# Patient Record
Sex: FEMALE | Race: WHITE | Employment: UNEMPLOYED | URBAN - METROPOLITAN AREA
[De-identification: names, ages, dates, MRNs, and addresses within clinical notes are randomized per-mention and may not be internally consistent; named-entity substitution may affect disease eponyms.]

---

## 2017-01-06 DIAGNOSIS — G89.4 CHRONIC PAIN SYNDROME: ICD-10-CM

## 2017-01-06 DIAGNOSIS — M17.0 PRIMARY OSTEOARTHRITIS OF BOTH KNEES: ICD-10-CM

## 2017-01-06 DIAGNOSIS — M79.7 FIBROMYALGIA: ICD-10-CM

## 2017-01-06 DIAGNOSIS — M51.37 DEGENERATION OF LUMBAR OR LUMBOSACRAL INTERVERTEBRAL DISC: ICD-10-CM

## 2017-01-06 RX ORDER — DICLOFENAC SODIUM 75 MG/1
TABLET, DELAYED RELEASE ORAL
Qty: 180 TABLET | Refills: 0 | OUTPATIENT
Start: 2017-01-06

## 2017-01-24 ENCOUNTER — OFFICE VISIT (OUTPATIENT)
Dept: PAIN MANAGEMENT | Age: 62
End: 2017-01-24

## 2017-01-24 VITALS
BODY MASS INDEX: 41.88 KG/M2 | HEART RATE: 67 BPM | SYSTOLIC BLOOD PRESSURE: 122 MMHG | DIASTOLIC BLOOD PRESSURE: 73 MMHG | WEIGHT: 229 LBS

## 2017-01-24 DIAGNOSIS — G89.29 CHRONIC NONINTRACTABLE HEADACHE, UNSPECIFIED HEADACHE TYPE: ICD-10-CM

## 2017-01-24 DIAGNOSIS — R51.9 CHRONIC NONINTRACTABLE HEADACHE, UNSPECIFIED HEADACHE TYPE: ICD-10-CM

## 2017-01-24 DIAGNOSIS — M51.37 DEGENERATION OF LUMBAR OR LUMBOSACRAL INTERVERTEBRAL DISC: ICD-10-CM

## 2017-01-24 DIAGNOSIS — M79.7 FIBROMYALGIA: ICD-10-CM

## 2017-01-24 DIAGNOSIS — G89.4 CHRONIC PAIN SYNDROME: ICD-10-CM

## 2017-01-24 DIAGNOSIS — M17.0 PRIMARY OSTEOARTHRITIS OF BOTH KNEES: ICD-10-CM

## 2017-01-24 PROCEDURE — 99213 OFFICE O/P EST LOW 20 MIN: CPT | Performed by: INTERNAL MEDICINE

## 2017-01-24 RX ORDER — OXYCODONE HYDROCHLORIDE 5 MG/1
5 TABLET ORAL EVERY 6 HOURS PRN
Qty: 105 TABLET | Refills: 0 | Status: SHIPPED | OUTPATIENT
Start: 2017-01-24 | End: 2017-02-28 | Stop reason: SDUPTHER

## 2017-01-24 RX ORDER — GABAPENTIN 600 MG/1
TABLET ORAL
Qty: 360 TABLET | Refills: 0 | Status: SHIPPED | OUTPATIENT
Start: 2017-01-24 | End: 2017-04-04 | Stop reason: SDUPTHER

## 2017-01-24 RX ORDER — MORPHINE SULFATE 30 MG/1
30 TABLET, FILM COATED, EXTENDED RELEASE ORAL DAILY
Qty: 35 TABLET | Refills: 0 | Status: SHIPPED | OUTPATIENT
Start: 2017-01-24 | End: 2017-02-28 | Stop reason: SDUPTHER

## 2017-01-24 RX ORDER — TOPIRAMATE 25 MG/1
TABLET ORAL
Qty: 60 TABLET | Refills: 0 | Status: SHIPPED | OUTPATIENT
Start: 2017-01-24 | End: 2017-02-28 | Stop reason: SDUPTHER

## 2017-01-24 RX ORDER — DICLOFENAC SODIUM 75 MG/1
TABLET, DELAYED RELEASE ORAL
Qty: 180 TABLET | Refills: 0 | Status: SHIPPED | OUTPATIENT
Start: 2017-01-24 | End: 2017-04-04 | Stop reason: SDUPTHER

## 2017-01-24 RX ORDER — AMITRIPTYLINE HYDROCHLORIDE 25 MG/1
TABLET, FILM COATED ORAL
Qty: 180 TABLET | Refills: 0 | Status: SHIPPED | OUTPATIENT
Start: 2017-01-24 | End: 2017-04-04 | Stop reason: SDUPTHER

## 2017-02-28 ENCOUNTER — OFFICE VISIT (OUTPATIENT)
Dept: PAIN MANAGEMENT | Age: 62
End: 2017-02-28

## 2017-02-28 VITALS
SYSTOLIC BLOOD PRESSURE: 134 MMHG | BODY MASS INDEX: 41.15 KG/M2 | WEIGHT: 225 LBS | HEART RATE: 72 BPM | DIASTOLIC BLOOD PRESSURE: 66 MMHG

## 2017-02-28 DIAGNOSIS — M79.7 FIBROMYALGIA: ICD-10-CM

## 2017-02-28 DIAGNOSIS — M17.0 PRIMARY OSTEOARTHRITIS OF BOTH KNEES: ICD-10-CM

## 2017-02-28 DIAGNOSIS — G89.4 CHRONIC PAIN SYNDROME: ICD-10-CM

## 2017-02-28 DIAGNOSIS — M51.37 DEGENERATION OF LUMBAR OR LUMBOSACRAL INTERVERTEBRAL DISC: ICD-10-CM

## 2017-02-28 PROCEDURE — 99213 OFFICE O/P EST LOW 20 MIN: CPT | Performed by: INTERNAL MEDICINE

## 2017-02-28 RX ORDER — TOPIRAMATE 25 MG/1
TABLET ORAL
Qty: 60 TABLET | Refills: 0 | Status: SHIPPED | OUTPATIENT
Start: 2017-02-28 | End: 2017-04-04 | Stop reason: SDUPTHER

## 2017-02-28 RX ORDER — OXYCODONE HYDROCHLORIDE 5 MG/1
5 TABLET ORAL EVERY 6 HOURS PRN
Qty: 105 TABLET | Refills: 0 | Status: SHIPPED | OUTPATIENT
Start: 2017-02-28 | End: 2017-04-04 | Stop reason: SDUPTHER

## 2017-02-28 RX ORDER — MORPHINE SULFATE 30 MG/1
30 TABLET, FILM COATED, EXTENDED RELEASE ORAL DAILY
Qty: 35 TABLET | Refills: 0 | Status: SHIPPED | OUTPATIENT
Start: 2017-02-28 | End: 2017-04-04 | Stop reason: SDUPTHER

## 2017-04-04 ENCOUNTER — OFFICE VISIT (OUTPATIENT)
Dept: ORTHOPEDIC SURGERY | Age: 62
End: 2017-04-04

## 2017-04-04 ENCOUNTER — OFFICE VISIT (OUTPATIENT)
Dept: PAIN MANAGEMENT | Age: 62
End: 2017-04-04

## 2017-04-04 VITALS
HEIGHT: 62 IN | HEART RATE: 74 BPM | SYSTOLIC BLOOD PRESSURE: 136 MMHG | BODY MASS INDEX: 41.42 KG/M2 | WEIGHT: 225.09 LBS | DIASTOLIC BLOOD PRESSURE: 78 MMHG

## 2017-04-04 VITALS
DIASTOLIC BLOOD PRESSURE: 69 MMHG | SYSTOLIC BLOOD PRESSURE: 124 MMHG | BODY MASS INDEX: 41.41 KG/M2 | HEART RATE: 70 BPM | WEIGHT: 225 LBS

## 2017-04-04 DIAGNOSIS — M79.7 FIBROMYALGIA: ICD-10-CM

## 2017-04-04 DIAGNOSIS — L03.032 PARONYCHIA OF GREAT TOE, LEFT: Primary | ICD-10-CM

## 2017-04-04 DIAGNOSIS — R51.9 CHRONIC NONINTRACTABLE HEADACHE, UNSPECIFIED HEADACHE TYPE: ICD-10-CM

## 2017-04-04 DIAGNOSIS — M51.37 DEGENERATION OF LUMBAR OR LUMBOSACRAL INTERVERTEBRAL DISC: ICD-10-CM

## 2017-04-04 DIAGNOSIS — G89.29 CHRONIC NONINTRACTABLE HEADACHE, UNSPECIFIED HEADACHE TYPE: ICD-10-CM

## 2017-04-04 DIAGNOSIS — G89.4 CHRONIC PAIN SYNDROME: ICD-10-CM

## 2017-04-04 DIAGNOSIS — M17.0 PRIMARY OSTEOARTHRITIS OF BOTH KNEES: ICD-10-CM

## 2017-04-04 PROCEDURE — 99213 OFFICE O/P EST LOW 20 MIN: CPT | Performed by: INTERNAL MEDICINE

## 2017-04-04 PROCEDURE — 99202 OFFICE O/P NEW SF 15 MIN: CPT | Performed by: PODIATRIST

## 2017-04-04 RX ORDER — GABAPENTIN 600 MG/1
TABLET ORAL
Qty: 360 TABLET | Refills: 0 | Status: SHIPPED | OUTPATIENT
Start: 2017-04-04 | End: 2017-05-09 | Stop reason: SDUPTHER

## 2017-04-04 RX ORDER — DICLOFENAC SODIUM 75 MG/1
TABLET, DELAYED RELEASE ORAL
Qty: 180 TABLET | Refills: 0 | Status: SHIPPED | OUTPATIENT
Start: 2017-04-04 | End: 2017-05-09 | Stop reason: SDUPTHER

## 2017-04-04 RX ORDER — CEPHALEXIN 500 MG/1
500 CAPSULE ORAL 3 TIMES DAILY
Qty: 30 CAPSULE | Refills: 0 | Status: SHIPPED | OUTPATIENT
Start: 2017-04-04 | End: 2018-01-02

## 2017-04-04 RX ORDER — AMITRIPTYLINE HYDROCHLORIDE 25 MG/1
TABLET, FILM COATED ORAL
Qty: 180 TABLET | Refills: 0 | Status: SHIPPED | OUTPATIENT
Start: 2017-04-04 | End: 2017-05-09 | Stop reason: SDUPTHER

## 2017-04-04 RX ORDER — OXYCODONE HYDROCHLORIDE 5 MG/1
5 TABLET ORAL EVERY 6 HOURS PRN
Qty: 105 TABLET | Refills: 0 | Status: SHIPPED | OUTPATIENT
Start: 2017-04-04 | End: 2017-05-09 | Stop reason: SDUPTHER

## 2017-04-04 RX ORDER — FLUOXETINE HYDROCHLORIDE 40 MG/1
CAPSULE ORAL
COMMUNITY
Start: 2017-02-06

## 2017-04-04 RX ORDER — TOPIRAMATE 25 MG/1
TABLET ORAL
Qty: 60 TABLET | Refills: 0 | Status: SHIPPED | OUTPATIENT
Start: 2017-04-04 | End: 2017-05-09 | Stop reason: SDUPTHER

## 2017-04-04 RX ORDER — MORPHINE SULFATE 30 MG/1
30 TABLET, FILM COATED, EXTENDED RELEASE ORAL DAILY
Qty: 35 TABLET | Refills: 0 | Status: SHIPPED | OUTPATIENT
Start: 2017-04-04 | End: 2017-05-09 | Stop reason: SDUPTHER

## 2017-04-04 RX ORDER — VITAMIN A ACETATE, .BETA.-CAROTENE, ASCORBIC ACID, CHOLECALCIFEROL, .ALPHA.-TOCOPHEROL ACETATE, DL-, THIAMINE MONONITRATE, RIBOFLAVIN, NIACINAMIDE, PYRIDOXINE HYDROCHLORIDE, FOLIC ACID, CYANOCOBALAMIN, CALCIUM CARBONATE, FERROUS FUMARATE, ZINC OXIDE, AND CUPRIC OXIDE 2000; 2000; 120; 400; 22; 1.84; 3; 20; 10; 1; 12; 200; 27; 25; 2 [IU]/1; [IU]/1; MG/1; [IU]/1; MG/1; MG/1; MG/1; MG/1; MG/1; MG/1; UG/1; MG/1; MG/1; MG/1; MG/1
TABLET ORAL
COMMUNITY
Start: 2017-03-13

## 2017-04-04 RX ORDER — RANITIDINE 300 MG/1
TABLET ORAL
COMMUNITY
Start: 2017-04-01 | End: 2020-04-27

## 2017-05-09 ENCOUNTER — OFFICE VISIT (OUTPATIENT)
Dept: PAIN MANAGEMENT | Age: 62
End: 2017-05-09

## 2017-05-09 VITALS
BODY MASS INDEX: 41.59 KG/M2 | HEART RATE: 68 BPM | DIASTOLIC BLOOD PRESSURE: 72 MMHG | WEIGHT: 226 LBS | SYSTOLIC BLOOD PRESSURE: 123 MMHG

## 2017-05-09 DIAGNOSIS — M51.37 DEGENERATION OF LUMBAR OR LUMBOSACRAL INTERVERTEBRAL DISC: ICD-10-CM

## 2017-05-09 DIAGNOSIS — M17.0 PRIMARY OSTEOARTHRITIS OF BOTH KNEES: ICD-10-CM

## 2017-05-09 DIAGNOSIS — G89.4 CHRONIC PAIN SYNDROME: ICD-10-CM

## 2017-05-09 DIAGNOSIS — M79.7 FIBROMYALGIA: ICD-10-CM

## 2017-05-09 PROCEDURE — 99213 OFFICE O/P EST LOW 20 MIN: CPT | Performed by: INTERNAL MEDICINE

## 2017-05-09 RX ORDER — OXYCODONE HYDROCHLORIDE 5 MG/1
5 TABLET ORAL EVERY 6 HOURS PRN
Qty: 105 TABLET | Refills: 0 | Status: SHIPPED | OUTPATIENT
Start: 2017-05-09 | End: 2017-06-13 | Stop reason: SDUPTHER

## 2017-05-09 RX ORDER — AMITRIPTYLINE HYDROCHLORIDE 25 MG/1
TABLET, FILM COATED ORAL
Qty: 180 TABLET | Refills: 0
Start: 2017-05-09 | End: 2017-07-18 | Stop reason: SDUPTHER

## 2017-05-09 RX ORDER — MORPHINE SULFATE 30 MG/1
30 TABLET, FILM COATED, EXTENDED RELEASE ORAL DAILY
Qty: 35 TABLET | Refills: 0 | Status: SHIPPED | OUTPATIENT
Start: 2017-05-09 | End: 2017-06-13 | Stop reason: SDUPTHER

## 2017-05-09 RX ORDER — GABAPENTIN 600 MG/1
TABLET ORAL
Qty: 360 TABLET | Refills: 0
Start: 2017-05-09 | End: 2017-07-18 | Stop reason: SDUPTHER

## 2017-05-09 RX ORDER — TOPIRAMATE 25 MG/1
TABLET ORAL
Qty: 60 TABLET | Refills: 0 | Status: SHIPPED | OUTPATIENT
Start: 2017-05-09 | End: 2017-06-13 | Stop reason: SDUPTHER

## 2017-05-09 RX ORDER — DICLOFENAC SODIUM 75 MG/1
TABLET, DELAYED RELEASE ORAL
Qty: 180 TABLET | Refills: 0
Start: 2017-05-09 | End: 2017-07-18 | Stop reason: SDUPTHER

## 2017-05-29 DIAGNOSIS — G89.4 CHRONIC PAIN SYNDROME: ICD-10-CM

## 2017-05-31 RX ORDER — TOPIRAMATE 25 MG/1
TABLET ORAL
Qty: 60 TABLET | Refills: 0 | Status: SHIPPED | OUTPATIENT
Start: 2017-05-31 | End: 2017-07-18

## 2017-06-13 ENCOUNTER — OFFICE VISIT (OUTPATIENT)
Dept: PAIN MANAGEMENT | Age: 62
End: 2017-06-13

## 2017-06-13 VITALS
DIASTOLIC BLOOD PRESSURE: 71 MMHG | BODY MASS INDEX: 41.59 KG/M2 | WEIGHT: 226 LBS | HEART RATE: 64 BPM | SYSTOLIC BLOOD PRESSURE: 124 MMHG

## 2017-06-13 DIAGNOSIS — M17.0 PRIMARY OSTEOARTHRITIS OF BOTH KNEES: ICD-10-CM

## 2017-06-13 DIAGNOSIS — M51.37 DEGENERATION OF LUMBAR OR LUMBOSACRAL INTERVERTEBRAL DISC: ICD-10-CM

## 2017-06-13 DIAGNOSIS — G89.4 CHRONIC PAIN SYNDROME: ICD-10-CM

## 2017-06-13 DIAGNOSIS — M79.7 FIBROMYALGIA: ICD-10-CM

## 2017-06-13 PROCEDURE — 99213 OFFICE O/P EST LOW 20 MIN: CPT | Performed by: INTERNAL MEDICINE

## 2017-06-13 RX ORDER — OXYCODONE HYDROCHLORIDE 5 MG/1
5 TABLET ORAL EVERY 6 HOURS PRN
Qty: 105 TABLET | Refills: 0 | Status: SHIPPED | OUTPATIENT
Start: 2017-06-13 | End: 2017-07-18 | Stop reason: SDUPTHER

## 2017-06-13 RX ORDER — MORPHINE SULFATE 30 MG/1
30 TABLET, FILM COATED, EXTENDED RELEASE ORAL DAILY
Qty: 35 TABLET | Refills: 0 | Status: SHIPPED | OUTPATIENT
Start: 2017-06-13 | End: 2017-07-18 | Stop reason: SDUPTHER

## 2017-06-13 RX ORDER — TOPIRAMATE 25 MG/1
TABLET ORAL
Qty: 60 TABLET | Refills: 0 | Status: SHIPPED | OUTPATIENT
Start: 2017-06-13 | End: 2017-07-18 | Stop reason: SDUPTHER

## 2017-07-18 ENCOUNTER — OFFICE VISIT (OUTPATIENT)
Dept: PAIN MANAGEMENT | Age: 62
End: 2017-07-18

## 2017-07-18 VITALS
WEIGHT: 224 LBS | DIASTOLIC BLOOD PRESSURE: 76 MMHG | SYSTOLIC BLOOD PRESSURE: 128 MMHG | HEART RATE: 72 BPM | BODY MASS INDEX: 41.22 KG/M2

## 2017-07-18 DIAGNOSIS — M51.37 DEGENERATION OF LUMBAR OR LUMBOSACRAL INTERVERTEBRAL DISC: ICD-10-CM

## 2017-07-18 DIAGNOSIS — M79.7 FIBROMYALGIA: ICD-10-CM

## 2017-07-18 DIAGNOSIS — G89.4 CHRONIC PAIN SYNDROME: ICD-10-CM

## 2017-07-18 DIAGNOSIS — M17.0 PRIMARY OSTEOARTHRITIS OF BOTH KNEES: ICD-10-CM

## 2017-07-18 PROCEDURE — 99213 OFFICE O/P EST LOW 20 MIN: CPT | Performed by: INTERNAL MEDICINE

## 2017-07-18 RX ORDER — TOPIRAMATE 25 MG/1
TABLET ORAL
Qty: 60 TABLET | Refills: 0 | Status: SHIPPED | OUTPATIENT
Start: 2017-07-18 | End: 2017-08-29 | Stop reason: SDUPTHER

## 2017-07-18 RX ORDER — AMITRIPTYLINE HYDROCHLORIDE 25 MG/1
TABLET, FILM COATED ORAL
Qty: 180 TABLET | Refills: 0 | Status: SHIPPED | OUTPATIENT
Start: 2017-07-18 | End: 2017-08-29 | Stop reason: SDUPTHER

## 2017-07-18 RX ORDER — DICLOFENAC SODIUM 75 MG/1
TABLET, DELAYED RELEASE ORAL
Qty: 180 TABLET | Refills: 0 | Status: SHIPPED | OUTPATIENT
Start: 2017-07-18 | End: 2017-08-29 | Stop reason: SDUPTHER

## 2017-07-18 RX ORDER — MORPHINE SULFATE 30 MG/1
30 TABLET, FILM COATED, EXTENDED RELEASE ORAL DAILY
Qty: 42 TABLET | Refills: 0 | Status: SHIPPED | OUTPATIENT
Start: 2017-07-18 | End: 2017-08-29 | Stop reason: SDUPTHER

## 2017-07-18 RX ORDER — GABAPENTIN 600 MG/1
TABLET ORAL
Qty: 360 TABLET | Refills: 0 | Status: SHIPPED | OUTPATIENT
Start: 2017-07-18 | End: 2017-08-29 | Stop reason: SDUPTHER

## 2017-07-18 RX ORDER — OXYCODONE HYDROCHLORIDE 5 MG/1
5 TABLET ORAL EVERY 6 HOURS PRN
Qty: 126 TABLET | Refills: 0 | Status: SHIPPED | OUTPATIENT
Start: 2017-07-18 | End: 2017-08-29 | Stop reason: SDUPTHER

## 2017-08-28 ENCOUNTER — TELEPHONE (OUTPATIENT)
Dept: PAIN MANAGEMENT | Age: 62
End: 2017-08-28

## 2017-08-29 ENCOUNTER — OFFICE VISIT (OUTPATIENT)
Dept: PAIN MANAGEMENT | Age: 62
End: 2017-08-29

## 2017-08-29 ENCOUNTER — TELEPHONE (OUTPATIENT)
Dept: PAIN MANAGEMENT | Age: 62
End: 2017-08-29

## 2017-08-29 VITALS
SYSTOLIC BLOOD PRESSURE: 113 MMHG | BODY MASS INDEX: 41.22 KG/M2 | DIASTOLIC BLOOD PRESSURE: 62 MMHG | HEART RATE: 64 BPM | WEIGHT: 224 LBS

## 2017-08-29 DIAGNOSIS — G89.4 CHRONIC PAIN SYNDROME: ICD-10-CM

## 2017-08-29 DIAGNOSIS — M79.7 FIBROMYALGIA: ICD-10-CM

## 2017-08-29 DIAGNOSIS — M17.0 PRIMARY OSTEOARTHRITIS OF BOTH KNEES: ICD-10-CM

## 2017-08-29 DIAGNOSIS — M51.37 DEGENERATION OF LUMBAR OR LUMBOSACRAL INTERVERTEBRAL DISC: ICD-10-CM

## 2017-08-29 PROCEDURE — 99213 OFFICE O/P EST LOW 20 MIN: CPT | Performed by: INTERNAL MEDICINE

## 2017-08-29 RX ORDER — DICLOFENAC SODIUM 75 MG/1
TABLET, DELAYED RELEASE ORAL
Qty: 180 TABLET | Refills: 1 | Status: SHIPPED | OUTPATIENT
Start: 2017-08-29 | End: 2017-10-10 | Stop reason: SDUPTHER

## 2017-08-29 RX ORDER — TOPIRAMATE 25 MG/1
TABLET ORAL
Qty: 60 TABLET | Refills: 1 | Status: SHIPPED | OUTPATIENT
Start: 2017-08-29 | End: 2017-10-10 | Stop reason: SDUPTHER

## 2017-08-29 RX ORDER — OXYCODONE HYDROCHLORIDE 5 MG/1
5 TABLET ORAL EVERY 6 HOURS PRN
Qty: 126 TABLET | Refills: 0 | Status: SHIPPED | OUTPATIENT
Start: 2017-08-29 | End: 2017-10-10 | Stop reason: SDUPTHER

## 2017-08-29 RX ORDER — GABAPENTIN 600 MG/1
TABLET ORAL
Qty: 360 TABLET | Refills: 1 | Status: SHIPPED | OUTPATIENT
Start: 2017-08-29 | End: 2017-10-10 | Stop reason: SDUPTHER

## 2017-08-29 RX ORDER — AMITRIPTYLINE HYDROCHLORIDE 25 MG/1
TABLET, FILM COATED ORAL
Qty: 180 TABLET | Refills: 1 | Status: SHIPPED | OUTPATIENT
Start: 2017-08-29 | End: 2017-10-10 | Stop reason: SDUPTHER

## 2017-08-29 RX ORDER — MORPHINE SULFATE 30 MG/1
30 TABLET, FILM COATED, EXTENDED RELEASE ORAL DAILY
Qty: 42 TABLET | Refills: 0 | Status: SHIPPED | OUTPATIENT
Start: 2017-08-29 | End: 2017-10-10 | Stop reason: SDUPTHER

## 2017-10-10 ENCOUNTER — OFFICE VISIT (OUTPATIENT)
Dept: PAIN MANAGEMENT | Age: 62
End: 2017-10-10

## 2017-10-10 VITALS
BODY MASS INDEX: 41.22 KG/M2 | HEART RATE: 70 BPM | DIASTOLIC BLOOD PRESSURE: 69 MMHG | SYSTOLIC BLOOD PRESSURE: 125 MMHG | WEIGHT: 224 LBS

## 2017-10-10 DIAGNOSIS — M17.0 PRIMARY OSTEOARTHRITIS OF BOTH KNEES: ICD-10-CM

## 2017-10-10 DIAGNOSIS — G89.4 CHRONIC PAIN SYNDROME: ICD-10-CM

## 2017-10-10 DIAGNOSIS — M51.37 DEGENERATION OF LUMBAR OR LUMBOSACRAL INTERVERTEBRAL DISC: ICD-10-CM

## 2017-10-10 DIAGNOSIS — M79.7 FIBROMYALGIA: ICD-10-CM

## 2017-10-10 PROCEDURE — 99213 OFFICE O/P EST LOW 20 MIN: CPT | Performed by: INTERNAL MEDICINE

## 2017-10-10 RX ORDER — OXYCODONE HYDROCHLORIDE 5 MG/1
5 TABLET ORAL EVERY 6 HOURS PRN
Qty: 126 TABLET | Refills: 0 | Status: SHIPPED | OUTPATIENT
Start: 2017-10-10 | End: 2017-11-21 | Stop reason: SDUPTHER

## 2017-10-10 RX ORDER — TOPIRAMATE 25 MG/1
TABLET ORAL
Qty: 60 TABLET | Refills: 0 | Status: SHIPPED | OUTPATIENT
Start: 2017-10-10 | End: 2017-11-21 | Stop reason: SDUPTHER

## 2017-10-10 RX ORDER — DICLOFENAC SODIUM 75 MG/1
TABLET, DELAYED RELEASE ORAL
Qty: 180 TABLET | Refills: 0 | Status: SHIPPED | OUTPATIENT
Start: 2017-10-10 | End: 2017-11-21 | Stop reason: SDUPTHER

## 2017-10-10 RX ORDER — GABAPENTIN 600 MG/1
TABLET ORAL
Qty: 360 TABLET | Refills: 0 | Status: SHIPPED | OUTPATIENT
Start: 2017-10-10 | End: 2017-11-21 | Stop reason: SDUPTHER

## 2017-10-10 RX ORDER — AMITRIPTYLINE HYDROCHLORIDE 25 MG/1
TABLET, FILM COATED ORAL
Qty: 180 TABLET | Refills: 0 | Status: SHIPPED | OUTPATIENT
Start: 2017-10-10 | End: 2017-11-21 | Stop reason: SDUPTHER

## 2017-10-10 RX ORDER — MORPHINE SULFATE 30 MG/1
30 TABLET, FILM COATED, EXTENDED RELEASE ORAL DAILY
Qty: 42 TABLET | Refills: 0 | Status: SHIPPED | OUTPATIENT
Start: 2017-10-10 | End: 2017-11-21 | Stop reason: SDUPTHER

## 2017-10-10 NOTE — PROGRESS NOTES
Angie Lan  1955  [id number]    HISTORY OF PRESENT ILLNESS:  Ms. Jerome Calderon is a 57 y.o. female returns for a follow up visit for multiple medical problems. Her current presenting problems are   1. Chronic pain syndrome    2. Degeneration of lumbar or lumbosacral intervertebral disc    3. Primary osteoarthritis of both knees    4. Fibromyalgia    . As per information/history obtained from the PADT(patient assessment and documentation tool) - She complains of pain in the neck and lower back with radiation to the shoulders Bilateral, elbows Bilateral, buttocks, hips Bilateral, knees Bilateral, ankles Bilateral and feet Bilateral She rates the pain 6/10 and describes it as sharp, aching, burning, numbness. Pain is made worse by: movement, walking, standing, sitting, bending, lifting. Current treatment regimen has helped relieve about 60% of the pain. She denies side effects from the current pain regimen. Patient reports that since the last follow up visit the physical functioning is unchanged, family/social relationships are unchanged, mood is unchanged and sleep patterns are unchanged, and that the overall functioning is unchanged. Patient denies neurological bowel or bladder. Patient denies misusing/abusing her narcotic pain medications or using any illegal drugs. There are No indicators for possible drug abuse, addiction or diversion problems. Upon obtaining the medical history from Ms. Jerome Calderon regarding today's office visit for her presenting problems,  Patient states her pain has been baseline. She says \" its always there and I just cope\". She mentions she is using Ms Contin with Oxycodone for break through pain. Patient states her sleep is fair. Has fairly normal sleep latency. Averages about 4-6 hours of sleep a night. Denies any signs of sleep apnea. Feels somewhat rested in the morning. She states her weight has been stable.  She reports she has been busy with house chores and activities mouth every 6 hours as needed for Pain . 126 tablet 0    topiramate (TOPAMAX) 25 MG tablet TAKE ONE TABLET BY MOUTH TWICE A DAY 60 tablet 1    amitriptyline (ELAVIL) 25 MG tablet TAKE ONE TO TWO TABLETS BY MOUTH ONCE NIGHTLY 180 tablet 1    gabapentin (NEURONTIN) 600 MG tablet Take 1 tab in the am, Take 1 tab in the afternoon, and then take 2 tabs in the pm 360 tablet 1    diclofenac (VOLTAREN) 75 MG EC tablet Take one tablet po BID for two weeks, and then  tablet 1    FLUoxetine (PROZAC) 40 MG capsule       ranitidine (ZANTAC) 300 MG tablet       Prenatal Vit-Fe Fumarate-FA (PNV PRENATAL PLUS MULTIVITAMIN) 27-1 MG TABS       Prenatal Vit-DSS-Fe Cbn-FA (VIRT-CHAN GT) 90-1 MG TABS       cephALEXin (KEFLEX) 500 MG capsule Take 1 capsule by mouth 3 times daily 30 capsule 0    omeprazole (PRILOSEC) 40 MG capsule Take 40 mg by mouth daily.  KLOR-CON M20 20 MEQ tablet TAKE TWO TABLETS BY MOUTH TWICE A  tablet 3    VIVELLE-DOT 0.05 MG/24HR APPLY 1 PATCH TO SKIN TWICE WEEKLY 8 patch 11    FLUOXETINE HCL Take 40 mg by mouth daily.  furosemide (LASIX) 20 MG tablet Take 20 mg by mouth 3 times daily.  therapeutic multivitamin-minerals (THERAGRAN-M) tablet Take 1 tablet by mouth daily. No current facility-administered medications for this visit. I will continue her current medication regimen  which is part of the above treatment schedule. It has been helping with Ms. Harris's chronic  medical problems which for this visit include:   Diagnoses of Chronic pain syndrome, Degeneration of lumbar or lumbosacral intervertebral disc, Primary osteoarthritis of both knees, and Fibromyalgia were pertinent to this visit. Risks and benefits of the medications and other alternative treatments  including no treatment were discussed with the patient. The common side effects of these medications were also explained to the patient. Informed verbal consent was obtained.    Goals of current

## 2017-11-21 ENCOUNTER — OFFICE VISIT (OUTPATIENT)
Dept: PAIN MANAGEMENT | Age: 62
End: 2017-11-21

## 2017-11-21 VITALS
SYSTOLIC BLOOD PRESSURE: 111 MMHG | DIASTOLIC BLOOD PRESSURE: 76 MMHG | BODY MASS INDEX: 41.22 KG/M2 | HEART RATE: 63 BPM | WEIGHT: 224 LBS

## 2017-11-21 DIAGNOSIS — G89.4 CHRONIC PAIN SYNDROME: ICD-10-CM

## 2017-11-21 DIAGNOSIS — M17.0 PRIMARY OSTEOARTHRITIS OF BOTH KNEES: ICD-10-CM

## 2017-11-21 DIAGNOSIS — M51.37 DEGENERATION OF LUMBAR OR LUMBOSACRAL INTERVERTEBRAL DISC: ICD-10-CM

## 2017-11-21 DIAGNOSIS — M79.7 FIBROMYALGIA: ICD-10-CM

## 2017-11-21 PROCEDURE — 99213 OFFICE O/P EST LOW 20 MIN: CPT | Performed by: INTERNAL MEDICINE

## 2017-11-21 RX ORDER — MORPHINE SULFATE 30 MG/1
30 TABLET, FILM COATED, EXTENDED RELEASE ORAL DAILY
Qty: 42 TABLET | Refills: 0 | Status: SHIPPED | OUTPATIENT
Start: 2017-11-21 | End: 2018-01-02 | Stop reason: SDUPTHER

## 2017-11-21 RX ORDER — OXYCODONE HYDROCHLORIDE 5 MG/1
5 TABLET ORAL EVERY 6 HOURS PRN
Qty: 126 TABLET | Refills: 0 | Status: SHIPPED | OUTPATIENT
Start: 2017-11-21 | End: 2018-01-02 | Stop reason: SDUPTHER

## 2017-11-21 RX ORDER — TOPIRAMATE 25 MG/1
TABLET ORAL
Qty: 60 TABLET | Refills: 0 | Status: SHIPPED | OUTPATIENT
Start: 2017-11-21 | End: 2018-01-02 | Stop reason: SDUPTHER

## 2017-11-21 RX ORDER — AMITRIPTYLINE HYDROCHLORIDE 25 MG/1
TABLET, FILM COATED ORAL
Qty: 180 TABLET | Refills: 0 | Status: SHIPPED | OUTPATIENT
Start: 2017-11-21 | End: 2018-01-02 | Stop reason: SDUPTHER

## 2017-11-21 RX ORDER — GABAPENTIN 600 MG/1
TABLET ORAL
Qty: 360 TABLET | Refills: 0 | Status: SHIPPED | OUTPATIENT
Start: 2017-11-21 | End: 2018-01-02 | Stop reason: SDUPTHER

## 2017-11-21 RX ORDER — DICLOFENAC SODIUM 75 MG/1
TABLET, DELAYED RELEASE ORAL
Qty: 180 TABLET | Refills: 0 | Status: SHIPPED | OUTPATIENT
Start: 2017-11-21 | End: 2018-01-02

## 2017-11-21 NOTE — PROGRESS NOTES
Asa Rom  1955  [id number]    HISTORY OF PRESENT ILLNESS:  Ms. Rafa Perez is a 57 y.o. female returns for a follow up visit for multiple medical problems. Her current presenting problems are   1. Fibromyalgia    2. Chronic pain syndrome    3. Degeneration of lumbar or lumbosacral intervertebral disc    4. Primary osteoarthritis of both knees    . As per information/history obtained from the PADT(patient assessment and documentation tool) - She complains of pain in the shoulders Bilateral with radiation to the elbows Bilateral, hands Bilateral, knees Bilateral and feet Bilateral She rates the pain 6/10 and describes it as sharp, aching. Pain is made worse by: movement. Current treatment regimen has helped relieve about 40% of the pain. She denies side effects from the current pain regimen. Patient reports that since the last follow up visit the physical functioning is unchanged, family/social relationships are unchanged, mood is unchanged and sleep patterns are unchanged, and that the overall functioning is unchanged. Patient denies neurological bowel or bladder. Patient denies misusing/abusing her narcotic pain medications or using any illegal drugs. There are No indicators for possible drug abuse, addiction or diversion problems. Upon obtaining the medical history from Ms. Rafa Perez regarding today's office visit for her presenting problems, patient states her pain has been tolerable with the medications. she states that the medications are helping with the headaches  and they are tolerable. Denies nausea, vomiting, sonophobia, photophobia, photopsia, diplopia, vertigo, neck stiffness. She states she feels the Topamax is helping. Patient states her sleep is fair. Has fairly normal sleep latency. Averages about 4-6 hours of sleep a night. Denies any signs of sleep apnea. Feels somewhat rested in the morning. Ms. Rafa Perez mentions using Elavil still.  She mentions she has been managing all of her home chores. ALLERGIES: Patients list of allergies were reviewed     MEDICATIONS: Ms. Ori Martinez list of medications were reviewed. Her current medications are   Outpatient Medications Prior to Visit   Medication Sig Dispense Refill    morphine (MS CONTIN) 30 MG extended release tablet Take 1 tablet by mouth daily . 42 tablet 0    oxyCODONE (ROXICODONE) 5 MG immediate release tablet Take 1 tablet by mouth every 6 hours as needed for Pain . 126 tablet 0    topiramate (TOPAMAX) 25 MG tablet TAKE ONE TABLET BY MOUTH TWICE A DAY 60 tablet 0    amitriptyline (ELAVIL) 25 MG tablet TAKE ONE TO TWO TABLETS BY MOUTH ONCE NIGHTLY 180 tablet 0    gabapentin (NEURONTIN) 600 MG tablet Take 1 tab in the am, Take 1 tab in the afternoon, and then take 2 tabs in the pm 360 tablet 0    diclofenac (VOLTAREN) 75 MG EC tablet Take one tablet po BID for two weeks, and then  tablet 0    FLUoxetine (PROZAC) 40 MG capsule       ranitidine (ZANTAC) 300 MG tablet       Prenatal Vit-Fe Fumarate-FA (PNV PRENATAL PLUS MULTIVITAMIN) 27-1 MG TABS       Prenatal Vit-DSS-Fe Cbn-FA (VIRT-CHAN GT) 90-1 MG TABS       cephALEXin (KEFLEX) 500 MG capsule Take 1 capsule by mouth 3 times daily 30 capsule 0    omeprazole (PRILOSEC) 40 MG capsule Take 40 mg by mouth daily.  KLOR-CON M20 20 MEQ tablet TAKE TWO TABLETS BY MOUTH TWICE A  tablet 3    VIVELLE-DOT 0.05 MG/24HR APPLY 1 PATCH TO SKIN TWICE WEEKLY 8 patch 11    FLUOXETINE HCL Take 40 mg by mouth daily.  furosemide (LASIX) 20 MG tablet Take 20 mg by mouth 3 times daily.  therapeutic multivitamin-minerals (THERAGRAN-M) tablet Take 1 tablet by mouth daily. No facility-administered medications prior to visit. SOCIAL/FAMILY/PAST MEDICAL HISTORY: Ms. Paulette Waller, family and past medical history was reviewed. REVIEW OF SYSTEMS:    Respiratory: Negative for apnea, chest tightness and shortness of breath or change in baseline breathing. naps during the day, erratic sleep schedule, heavy meals near bedtime, vigorous exercise near bedtime and use of electronic devices near bedtime     Current Outpatient Prescriptions   Medication Sig Dispense Refill    morphine (MS CONTIN) 30 MG extended release tablet Take 1 tablet by mouth daily . 42 tablet 0    oxyCODONE (ROXICODONE) 5 MG immediate release tablet Take 1 tablet by mouth every 6 hours as needed for Pain . 126 tablet 0    topiramate (TOPAMAX) 25 MG tablet TAKE ONE TABLET BY MOUTH TWICE A DAY 60 tablet 0    amitriptyline (ELAVIL) 25 MG tablet TAKE ONE TO TWO TABLETS BY MOUTH ONCE NIGHTLY 180 tablet 0    gabapentin (NEURONTIN) 600 MG tablet Take 1 tab in the am, Take 1 tab in the afternoon, and then take 2 tabs in the pm 360 tablet 0    diclofenac (VOLTAREN) 75 MG EC tablet Take one tablet po BID for two weeks, and then  tablet 0    FLUoxetine (PROZAC) 40 MG capsule       ranitidine (ZANTAC) 300 MG tablet       Prenatal Vit-Fe Fumarate-FA (PNV PRENATAL PLUS MULTIVITAMIN) 27-1 MG TABS       Prenatal Vit-DSS-Fe Cbn-FA (VIRT-CHAN GT) 90-1 MG TABS       cephALEXin (KEFLEX) 500 MG capsule Take 1 capsule by mouth 3 times daily 30 capsule 0    omeprazole (PRILOSEC) 40 MG capsule Take 40 mg by mouth daily.  KLOR-CON M20 20 MEQ tablet TAKE TWO TABLETS BY MOUTH TWICE A  tablet 3    VIVELLE-DOT 0.05 MG/24HR APPLY 1 PATCH TO SKIN TWICE WEEKLY 8 patch 11    FLUOXETINE HCL Take 40 mg by mouth daily.  furosemide (LASIX) 20 MG tablet Take 20 mg by mouth 3 times daily.  therapeutic multivitamin-minerals (THERAGRAN-M) tablet Take 1 tablet by mouth daily. No current facility-administered medications for this visit. I will continue her current medication regimen  which is part of the above treatment schedule. It has been helping with Ms. Harris's chronic  medical problems which for this visit include:   Diagnoses of Fibromyalgia, Chronic pain syndrome, Degeneration described in this documentation as scribed by   Yvonne Connell MA in my presence and it is both accurate and complete

## 2017-12-08 ENCOUNTER — TELEPHONE (OUTPATIENT)
Dept: PAIN MANAGEMENT | Age: 62
End: 2017-12-08

## 2017-12-08 NOTE — TELEPHONE ENCOUNTER
Dr. Georgia Joaquin and spoked with Dr. Kurt Kat. Per [facility] have patient decrease Neurontin 600 mg BID and Discontinue Voltaren.

## 2017-12-08 NOTE — TELEPHONE ENCOUNTER
Dr. Cong Field requesting to speak to [facility] about this patient. Dr. Fer Maravilla number [phone]. Please advise.

## 2018-01-02 ENCOUNTER — OFFICE VISIT (OUTPATIENT)
Dept: PAIN MANAGEMENT | Age: 63
End: 2018-01-02

## 2018-01-02 VITALS
BODY MASS INDEX: 39.86 KG/M2 | WEIGHT: 225 LBS | SYSTOLIC BLOOD PRESSURE: 127 MMHG | DIASTOLIC BLOOD PRESSURE: 89 MMHG | HEART RATE: 85 BPM

## 2018-01-02 DIAGNOSIS — M79.7 FIBROMYALGIA: ICD-10-CM

## 2018-01-02 DIAGNOSIS — M17.0 PRIMARY OSTEOARTHRITIS OF BOTH KNEES: ICD-10-CM

## 2018-01-02 DIAGNOSIS — R51.9 CHRONIC NONINTRACTABLE HEADACHE, UNSPECIFIED HEADACHE TYPE: ICD-10-CM

## 2018-01-02 DIAGNOSIS — F51.01 PRIMARY INSOMNIA: ICD-10-CM

## 2018-01-02 DIAGNOSIS — G89.29 CHRONIC NONINTRACTABLE HEADACHE, UNSPECIFIED HEADACHE TYPE: ICD-10-CM

## 2018-01-02 DIAGNOSIS — M51.37 DEGENERATION OF LUMBAR OR LUMBOSACRAL INTERVERTEBRAL DISC: ICD-10-CM

## 2018-01-02 DIAGNOSIS — G89.4 CHRONIC PAIN SYNDROME: ICD-10-CM

## 2018-01-02 PROCEDURE — 99214 OFFICE O/P EST MOD 30 MIN: CPT | Performed by: INTERNAL MEDICINE

## 2018-01-02 PROCEDURE — 20553 NJX 1/MLT TRIGGER POINTS 3/>: CPT | Performed by: INTERNAL MEDICINE

## 2018-01-02 RX ORDER — OXYCODONE HYDROCHLORIDE 5 MG/1
5 TABLET ORAL EVERY 6 HOURS PRN
Qty: 126 TABLET | Refills: 0 | Status: SHIPPED | OUTPATIENT
Start: 2018-01-02 | End: 2018-02-13 | Stop reason: SDUPTHER

## 2018-01-02 RX ORDER — MORPHINE SULFATE 30 MG/1
30 TABLET, FILM COATED, EXTENDED RELEASE ORAL DAILY
Qty: 42 TABLET | Refills: 0 | Status: SHIPPED | OUTPATIENT
Start: 2018-01-02 | End: 2018-02-13 | Stop reason: SDUPTHER

## 2018-01-02 RX ORDER — TRIAMCINOLONE ACETONIDE 40 MG/ML
40 INJECTION, SUSPENSION INTRA-ARTICULAR; INTRAMUSCULAR ONCE
Status: COMPLETED | OUTPATIENT
Start: 2018-01-02 | End: 2018-01-02

## 2018-01-02 RX ORDER — METHOCARBAMOL 500 MG/1
TABLET, FILM COATED ORAL
Qty: 90 TABLET | Refills: 0 | Status: SHIPPED | OUTPATIENT
Start: 2018-01-02 | End: 2018-02-13

## 2018-01-02 RX ORDER — AMITRIPTYLINE HYDROCHLORIDE 25 MG/1
TABLET, FILM COATED ORAL
Qty: 180 TABLET | Refills: 0 | Status: SHIPPED
Start: 2018-01-02 | End: 2018-02-13 | Stop reason: SDUPTHER

## 2018-01-02 RX ORDER — GABAPENTIN 600 MG/1
TABLET ORAL
Qty: 360 TABLET | Refills: 0
Start: 2018-01-02 | End: 2018-02-13 | Stop reason: SDUPTHER

## 2018-01-02 RX ORDER — TOPIRAMATE 25 MG/1
TABLET ORAL
Qty: 60 TABLET | Refills: 0 | Status: SHIPPED | OUTPATIENT
Start: 2018-01-02 | End: 2018-02-13 | Stop reason: SDUPTHER

## 2018-01-02 RX ADMIN — TRIAMCINOLONE ACETONIDE 40 MG: 40 INJECTION, SUSPENSION INTRA-ARTICULAR; INTRAMUSCULAR at 14:17

## 2018-01-02 NOTE — PROGRESS NOTES
-Most recent labs were reviewed and are within normal limits. Will repeat them within next 9-12 months if there is no status change  -d/c Voltaren   -Continue all other medications   -Continue with Neurontin 1800 mg   -Start tens unit   -Informed verbal consent was obtained from the patient. Risks and benefits of the procedure were explained. Complications of the procedure and side effects of kenalog/ lidocaine were discussed with patient. Using 0.25% marcaine and 1cc of kenalog, the the tender trigger point areas in the  Paraspinal, upper trapezius and semispinalis capitis muscles were injected under aseptic condition. Mobilization attempted by stretching. Patient tolerated procedure well. Adv to apply ice today. -PT exercises given   -Postural exercises as advised   -Start Robaxin 500 mg TID   -Continue with Topamax same dose   -Cant afford PT     Jyoti Snowball was seen today for follow-up. Diagnoses and all orders for this visit:    Fibromyalgia  -     oxyCODONE (ROXICODONE) 5 MG immediate release tablet; Take 1 tablet by mouth every 6 hours as needed for Pain (max 3 per day) for up to 42 days. -     morphine (MS CONTIN) 30 MG extended release tablet; Take 1 tablet by mouth daily for 42 days.  -     gabapentin (NEURONTIN) 600 MG tablet; Take 1 tab in the am, Take 1 tab in the afternoon, and then take 2 tabs in the pm.  -     methocarbamol (ROBAXIN) 500 MG tablet; Take one tablet Po TID  -     ME INJECT TRIGGER POINTS, 3 OR GREATER  -     triamcinolone acetonide (KENALOG-40) injection 40 mg; Inject 1 mL into the muscle once    Chronic nonintractable headache, unspecified headache type  -     ME INJECT TRIGGER POINTS, 3 OR GREATER  -     triamcinolone acetonide (KENALOG-40) injection 40 mg; Inject 1 mL into the muscle once    Chronic pain syndrome  -     oxyCODONE (ROXICODONE) 5 MG immediate release tablet; Take 1 tablet by mouth every 6 hours as needed for Pain (max 3 per day) for up to 42 days.   -     morphine

## 2018-02-13 ENCOUNTER — OFFICE VISIT (OUTPATIENT)
Dept: PAIN MANAGEMENT | Age: 63
End: 2018-02-13

## 2018-02-13 VITALS
WEIGHT: 225 LBS | BODY MASS INDEX: 39.86 KG/M2 | SYSTOLIC BLOOD PRESSURE: 118 MMHG | HEART RATE: 69 BPM | DIASTOLIC BLOOD PRESSURE: 72 MMHG

## 2018-02-13 DIAGNOSIS — M51.37 DEGENERATION OF LUMBAR OR LUMBOSACRAL INTERVERTEBRAL DISC: ICD-10-CM

## 2018-02-13 DIAGNOSIS — M79.7 FIBROMYALGIA: ICD-10-CM

## 2018-02-13 DIAGNOSIS — G89.4 CHRONIC PAIN SYNDROME: ICD-10-CM

## 2018-02-13 DIAGNOSIS — M17.0 PRIMARY OSTEOARTHRITIS OF BOTH KNEES: ICD-10-CM

## 2018-02-13 PROCEDURE — 99213 OFFICE O/P EST LOW 20 MIN: CPT | Performed by: INTERNAL MEDICINE

## 2018-02-13 RX ORDER — TOPIRAMATE 25 MG/1
TABLET ORAL
Qty: 60 TABLET | Refills: 0 | Status: SHIPPED | OUTPATIENT
Start: 2018-02-13 | End: 2018-03-27 | Stop reason: SDUPTHER

## 2018-02-13 RX ORDER — OXYCODONE HYDROCHLORIDE 5 MG/1
5 TABLET ORAL EVERY 6 HOURS PRN
Qty: 126 TABLET | Refills: 0 | Status: SHIPPED | OUTPATIENT
Start: 2018-02-13 | End: 2018-03-27 | Stop reason: SDUPTHER

## 2018-02-13 RX ORDER — GABAPENTIN 600 MG/1
TABLET ORAL
Qty: 360 TABLET | Refills: 0 | Status: SHIPPED | OUTPATIENT
Start: 2018-02-13 | End: 2018-03-27 | Stop reason: SDUPTHER

## 2018-02-13 RX ORDER — AMITRIPTYLINE HYDROCHLORIDE 25 MG/1
TABLET, FILM COATED ORAL
Qty: 180 TABLET | Refills: 0 | Status: SHIPPED | OUTPATIENT
Start: 2018-02-13 | End: 2018-03-27 | Stop reason: SDUPTHER

## 2018-02-13 RX ORDER — MORPHINE SULFATE 30 MG/1
30 TABLET, FILM COATED, EXTENDED RELEASE ORAL DAILY
Qty: 42 TABLET | Refills: 0 | Status: SHIPPED | OUTPATIENT
Start: 2018-02-13 | End: 2018-03-27 | Stop reason: SDUPTHER

## 2018-02-13 NOTE — PROGRESS NOTES
Nia Stairs  1955  [id number]    HISTORY OF PRESENT ILLNESS:  Ms. Juan Chin is a 70 y.o. female returns for a follow up visit for multiple medical problems. Her current presenting problems are   1. Chronic pain syndrome    2. Fibromyalgia    3. Degeneration of lumbar or lumbosacral intervertebral disc    4. Primary osteoarthritis of both knees    . As per information/history obtained from the PADT(patient assessment and documentation tool) - She complains of pain in the neck, upper back, mid back and lower back with radiation to the shoulders Bilateral, hands Bilateral, knees Bilateral, ankles Bilateral and feet Bilateral She rates the pain 6/10 and describes it as sharp, aching, burning, numbness, pins and needles. Pain is made worse by: movement, walking, standing, sitting, bending, lifting. Current treatment regimen has helped relieve about 60% of the pain. She denies side effects from the current pain regimen. Patient reports that since the last follow up visit the physical functioning is unchanged, family/social relationships are unchanged, mood is unchanged and sleep patterns are unchanged, and that the overall functioning is unchanged. Patient denies neurological bowel or bladder. Patient denies misusing/abusing her narcotic pain medications or using any illegal drugs. There are No indicators for possible drug abuse, addiction or diversion problems. Upon obtaining the medical history from Ms. Juan Chin regarding today's office visit for her presenting problems,Patient reports she is doing better. Patient reports that the pain is fairly well tolerated with the current regimen has had some exacerbations, but overall has been tolerable. Ms. Juan Chin states that  she has been staying with her exercise routine and working indoors and/or outdoors as tolerated, performing household chores. She complains she is getting headache symptoms, but are not very frequent.  She says she is having a lot of family stressors. She mentions she is not using the Robaxin, due to it is to expensive. Patient states her sleep is fair. Has fairly normal sleep latency. Averages about 4-6 hours of sleep a night. Denies any signs of sleep apnea. Feels somewhat rested in the morning. She reports she has been using Voltaren as needed. ALLERGIES: Patients list of allergies were reviewed     MEDICATIONS: Ms. Dickson Craft list of medications were reviewed. Her current medications are   Outpatient Medications Prior to Visit   Medication Sig Dispense Refill    oxyCODONE (ROXICODONE) 5 MG immediate release tablet Take 1 tablet by mouth every 6 hours as needed for Pain (max 3 per day) for up to 42 days. 126 tablet 0    morphine (MS CONTIN) 30 MG extended release tablet Take 1 tablet by mouth daily for 42 days. 42 tablet 0    topiramate (TOPAMAX) 25 MG tablet TAKE ONE TABLET BY MOUTH TWICE A DAY 60 tablet 0    amitriptyline (ELAVIL) 25 MG tablet TAKE ONE TO TWO TABLETS BY MOUTH ONCE NIGHTLY 180 tablet 0    FLUoxetine (PROZAC) 40 MG capsule       ranitidine (ZANTAC) 300 MG tablet       Prenatal Vit-Fe Fumarate-FA (PNV PRENATAL PLUS MULTIVITAMIN) 27-1 MG TABS       Prenatal Vit-DSS-Fe Cbn-FA (VIRT-CHAN GT) 90-1 MG TABS       omeprazole (PRILOSEC) 40 MG capsule Take 40 mg by mouth daily.  KLOR-CON M20 20 MEQ tablet TAKE TWO TABLETS BY MOUTH TWICE A  tablet 3    VIVELLE-DOT 0.05 MG/24HR APPLY 1 PATCH TO SKIN TWICE WEEKLY 8 patch 11    furosemide (LASIX) 20 MG tablet Take 20 mg by mouth 3 times daily.  therapeutic multivitamin-minerals (THERAGRAN-M) tablet Take 1 tablet by mouth daily.  gabapentin (NEURONTIN) 600 MG tablet Take 1 tab in the am, Take 1 tab in the afternoon, and then take 2 tabs in the pm. 360 tablet 0    methocarbamol (ROBAXIN) 500 MG tablet Take one tablet Po TID 90 tablet 0     No facility-administered medications prior to visit.         SOCIAL/FAMILY/PAST MEDICAL HISTORY: Ms. Jonathan Johnson,

## 2018-03-27 ENCOUNTER — OFFICE VISIT (OUTPATIENT)
Dept: PAIN MANAGEMENT | Age: 63
End: 2018-03-27

## 2018-03-27 VITALS
SYSTOLIC BLOOD PRESSURE: 121 MMHG | WEIGHT: 225 LBS | DIASTOLIC BLOOD PRESSURE: 69 MMHG | HEART RATE: 75 BPM | BODY MASS INDEX: 39.86 KG/M2

## 2018-03-27 DIAGNOSIS — M79.7 FIBROMYALGIA: ICD-10-CM

## 2018-03-27 DIAGNOSIS — M51.37 DEGENERATION OF LUMBAR OR LUMBOSACRAL INTERVERTEBRAL DISC: ICD-10-CM

## 2018-03-27 DIAGNOSIS — G89.4 CHRONIC PAIN SYNDROME: ICD-10-CM

## 2018-03-27 DIAGNOSIS — M17.0 PRIMARY OSTEOARTHRITIS OF BOTH KNEES: ICD-10-CM

## 2018-03-27 PROCEDURE — 99213 OFFICE O/P EST LOW 20 MIN: CPT | Performed by: INTERNAL MEDICINE

## 2018-03-27 RX ORDER — TOPIRAMATE 25 MG/1
TABLET ORAL
Qty: 60 TABLET | Refills: 0 | Status: SHIPPED | OUTPATIENT
Start: 2018-03-27 | End: 2018-05-10 | Stop reason: SDUPTHER

## 2018-03-27 RX ORDER — AMITRIPTYLINE HYDROCHLORIDE 25 MG/1
TABLET, FILM COATED ORAL
Qty: 180 TABLET | Refills: 0 | Status: SHIPPED | OUTPATIENT
Start: 2018-03-27 | End: 2018-05-10 | Stop reason: SDUPTHER

## 2018-03-27 RX ORDER — MORPHINE SULFATE 30 MG/1
30 TABLET, FILM COATED, EXTENDED RELEASE ORAL DAILY
Qty: 44 TABLET | Refills: 0 | Status: SHIPPED | OUTPATIENT
Start: 2018-03-27 | End: 2018-05-10 | Stop reason: SDUPTHER

## 2018-03-27 RX ORDER — OXYCODONE HYDROCHLORIDE 5 MG/1
5 TABLET ORAL EVERY 6 HOURS PRN
Qty: 132 TABLET | Refills: 0 | Status: SHIPPED | OUTPATIENT
Start: 2018-03-27 | End: 2018-05-10 | Stop reason: SDUPTHER

## 2018-03-27 RX ORDER — GABAPENTIN 600 MG/1
TABLET ORAL
Qty: 360 TABLET | Refills: 0 | Status: SHIPPED | OUTPATIENT
Start: 2018-03-27 | End: 2018-05-10 | Stop reason: SDUPTHER

## 2018-03-27 NOTE — PROGRESS NOTES
Colleen Toussaint  1955  [id number]    HISTORY OF PRESENT ILLNESS:  Ms. Yris Cleveland is a 70 y.o. female returns for a follow up visit for multiple medical problems. Her current presenting problems are   1. Chronic pain syndrome    2. Fibromyalgia    3. Degeneration of lumbar or lumbosacral intervertebral disc    4. Recurrent major depressive disorder, in partial remission ([address]    5. Primary osteoarthritis of both knees    6. Chronic nonintractable headache, unspecified headache type    7. Gastroesophageal reflux disease without esophagitis    . As per information/history obtained from the PADT(patient assessment and documentation tool) - She complains of pain in the neck, elbows Bilateral and knees Bilateral with radiation to the hands Bilateral and feet Bilateral She rates the pain 6/10 and describes it as throbbing. Pain is made worse by: movement, walking, standing. Current treatment regimen has helped relieve about 50% of the pain. She denies side effects from the current pain regimen. Patient reports that since the last follow up visit the physical functioning is unchanged, family/social relationships are unchanged, mood is unchanged and sleep patterns are unchanged, and that the overall functioning is unchanged. Patient denies neurological bowel or bladder. Patient denies misusing/abusing her narcotic pain medications or using any illegal drugs. There are No indicators for possible drug abuse, addiction or diversion problems. Eusebia Coy Upon obtaining the medical history from Ms. Yris Cleveland regarding today's office visit for her presenting problems, patient states her pain has been manageable, medications are helping with her pain. Patient denies any side effects with the medications. Patient states her sleep is fair. Has fairly normal sleep latency. Averages about 4-6 hours of sleep a night. Denies any signs of sleep apnea.  Feels somewhat rested in the morning, using Elavil.she states that the medications are Gastrointestinal: Negative for nausea, vomiting, abdominal pain, diarrhea, constipation, blood in stool and abdominal distention. PHYSICAL EXAM:   Nursing note and vitals reviewed. /69   Pulse 75   Wt 225 lb (102.1 kg)   BMI 39.86 kg/m²   Constitutional: She appears well-developed and well-nourished. No acute distress. Skin: Skin is warm and dry, good turgor. No rash noted. She is not diaphoretic. Cardiovascular: Normal rate, regular rhythm, normal heart sounds, and does not have murmur. Pulmonary/Chest: Effort normal. No respiratory distress. She does not have wheezes in the lung fields. She has no rales. Neurological/Psychiatric:She is alert and oriented to person, place, and time. Coordination is  normal. Her mood isAppropriate and affect is Neutral/Euthymic(normal) . IMPRESSION:   1. Chronic pain syndrome    2. Fibromyalgia    3. Degeneration of lumbar or lumbosacral intervertebral disc    4.  Primary osteoarthritis of both knees        PLAN:  Informed verbal consent was obtained  -She was advised to increase fluids ( 5-7  glasses of fluid daily), limit caffeine, avoid cheese products, increase dietary fiber, increase activity and exercise as tolerated and relax regularly and enjoy meals  -She was advised weight reduction, diet changes- 800-1200 idalia diet, diet diary, exercising, nutritional  consult increased physical activity as tolerated  -she was advised proper sleep hygiene-told to avoid:use of caffeine or other stimulants after noon, alcohol use near bedtime, long or frequent naps during the day, erratic sleep schedule, heavy meals near bedtime, vigorous exercise near bedtime and use of electronic devices near bedtime  -no change in opioid dose  -Walking/stretching exercises as advised     Current Outpatient Prescriptions   Medication Sig Dispense Refill    oxyCODONE (ROXICODONE) 5 MG immediate release tablet Take 1 tablet by mouth every 6 hours as needed for Pain (max 3 per day) for up to 42 days. 126 tablet 0    morphine (MS CONTIN) 30 MG extended release tablet Take 1 tablet by mouth daily for 42 days. 42 tablet 0    topiramate (TOPAMAX) 25 MG tablet TAKE ONE TABLET BY MOUTH TWICE A DAY 60 tablet 0    amitriptyline (ELAVIL) 25 MG tablet TAKE ONE TO TWO TABLETS BY MOUTH ONCE NIGHTLY 180 tablet 0    FLUoxetine (PROZAC) 40 MG capsule       ranitidine (ZANTAC) 300 MG tablet       Prenatal Vit-Fe Fumarate-FA (PNV PRENATAL PLUS MULTIVITAMIN) 27-1 MG TABS       Prenatal Vit-DSS-Fe Cbn-FA (VIRT-CHAN GT) 90-1 MG TABS       omeprazole (PRILOSEC) 40 MG capsule Take 40 mg by mouth daily.  KLOR-CON M20 20 MEQ tablet TAKE TWO TABLETS BY MOUTH TWICE A  tablet 3    VIVELLE-DOT 0.05 MG/24HR APPLY 1 PATCH TO SKIN TWICE WEEKLY 8 patch 11    furosemide (LASIX) 20 MG tablet Take 20 mg by mouth 3 times daily.  therapeutic multivitamin-minerals (THERAGRAN-M) tablet Take 1 tablet by mouth daily.  gabapentin (NEURONTIN) 600 MG tablet Take 1 tab in the am, Take 1 tab in the afternoon, and then take 2 tabs in the pm. 360 tablet 0     No current facility-administered medications for this visit. I will continue her current medication regimen  which is part of the above treatment schedule. It has been helping with Ms. Harris's chronic  medical problems which for this visit include:   Diagnoses of Chronic pain syndrome, Fibromyalgia, Degeneration of lumbar or lumbosacral intervertebral disc, Recurrent major depressive disorder, in partial remission ([address] Primary osteoarthritis of both knees, Chronic nonintractable headache, unspecified headache type, and Gastroesophageal reflux disease without esophagitis were pertinent to this visit. Risks and benefits of the medications and other alternative treatments  including no treatment were discussed with the patient. The common side effects of these medications were also explained to the patient.   Informed verbal consent was

## 2018-05-10 ENCOUNTER — OFFICE VISIT (OUTPATIENT)
Dept: PAIN MANAGEMENT | Age: 63
End: 2018-05-10

## 2018-05-10 VITALS
WEIGHT: 225 LBS | HEART RATE: 72 BPM | SYSTOLIC BLOOD PRESSURE: 127 MMHG | BODY MASS INDEX: 39.86 KG/M2 | DIASTOLIC BLOOD PRESSURE: 71 MMHG

## 2018-05-10 DIAGNOSIS — M51.37 DEGENERATION OF LUMBAR OR LUMBOSACRAL INTERVERTEBRAL DISC: ICD-10-CM

## 2018-05-10 DIAGNOSIS — G89.29 CHRONIC NONINTRACTABLE HEADACHE, UNSPECIFIED HEADACHE TYPE: ICD-10-CM

## 2018-05-10 DIAGNOSIS — M17.0 PRIMARY OSTEOARTHRITIS OF BOTH KNEES: ICD-10-CM

## 2018-05-10 DIAGNOSIS — G89.4 CHRONIC PAIN SYNDROME: ICD-10-CM

## 2018-05-10 DIAGNOSIS — R51.9 CHRONIC NONINTRACTABLE HEADACHE, UNSPECIFIED HEADACHE TYPE: ICD-10-CM

## 2018-05-10 DIAGNOSIS — M79.7 FIBROMYALGIA: ICD-10-CM

## 2018-05-10 PROCEDURE — 99213 OFFICE O/P EST LOW 20 MIN: CPT | Performed by: INTERNAL MEDICINE

## 2018-05-10 RX ORDER — TOPIRAMATE 25 MG/1
TABLET ORAL
Qty: 60 TABLET | Refills: 1 | Status: SHIPPED | OUTPATIENT
Start: 2018-05-10 | End: 2018-06-21 | Stop reason: SDUPTHER

## 2018-05-10 RX ORDER — AMITRIPTYLINE HYDROCHLORIDE 25 MG/1
TABLET, FILM COATED ORAL
Qty: 180 TABLET | Refills: 0 | Status: SHIPPED | OUTPATIENT
Start: 2018-05-10 | End: 2018-06-21 | Stop reason: SDUPTHER

## 2018-05-10 RX ORDER — GABAPENTIN 600 MG/1
TABLET ORAL
Qty: 360 TABLET | Refills: 0 | Status: SHIPPED | OUTPATIENT
Start: 2018-05-10 | End: 2018-06-21 | Stop reason: SDUPTHER

## 2018-05-10 RX ORDER — MORPHINE SULFATE 30 MG/1
30 TABLET, FILM COATED, EXTENDED RELEASE ORAL DAILY
Qty: 42 TABLET | Refills: 0 | Status: SHIPPED | OUTPATIENT
Start: 2018-05-10 | End: 2018-06-21 | Stop reason: SDUPTHER

## 2018-05-10 RX ORDER — OXYCODONE HYDROCHLORIDE 5 MG/1
5 TABLET ORAL EVERY 6 HOURS PRN
Qty: 126 TABLET | Refills: 0 | Status: SHIPPED | OUTPATIENT
Start: 2018-05-10 | End: 2018-06-21 | Stop reason: SDUPTHER

## 2018-05-30 DIAGNOSIS — M17.0 PRIMARY OSTEOARTHRITIS OF BOTH KNEES: ICD-10-CM

## 2018-05-30 DIAGNOSIS — M51.37 DEGENERATION OF LUMBAR OR LUMBOSACRAL INTERVERTEBRAL DISC: ICD-10-CM

## 2018-05-30 DIAGNOSIS — G89.4 CHRONIC PAIN SYNDROME: ICD-10-CM

## 2018-05-30 DIAGNOSIS — M79.7 FIBROMYALGIA: ICD-10-CM

## 2018-05-30 RX ORDER — DICLOFENAC SODIUM 75 MG/1
TABLET, DELAYED RELEASE ORAL
Qty: 180 TABLET | Refills: 0 | OUTPATIENT
Start: 2018-05-30

## 2018-05-31 DIAGNOSIS — M17.0 PRIMARY OSTEOARTHRITIS OF BOTH KNEES: ICD-10-CM

## 2018-05-31 DIAGNOSIS — G89.4 CHRONIC PAIN SYNDROME: ICD-10-CM

## 2018-05-31 DIAGNOSIS — M51.37 DEGENERATION OF LUMBAR OR LUMBOSACRAL INTERVERTEBRAL DISC: ICD-10-CM

## 2018-05-31 DIAGNOSIS — M79.7 FIBROMYALGIA: ICD-10-CM

## 2018-06-01 ENCOUNTER — TELEPHONE (OUTPATIENT)
Dept: PAIN MANAGEMENT | Age: 63
End: 2018-06-01

## 2018-06-01 DIAGNOSIS — G89.4 CHRONIC PAIN SYNDROME: ICD-10-CM

## 2018-06-01 DIAGNOSIS — M17.0 PRIMARY OSTEOARTHRITIS OF BOTH KNEES: ICD-10-CM

## 2018-06-01 DIAGNOSIS — M79.7 FIBROMYALGIA: ICD-10-CM

## 2018-06-01 DIAGNOSIS — M51.37 DEGENERATION OF LUMBAR OR LUMBOSACRAL INTERVERTEBRAL DISC: ICD-10-CM

## 2018-06-01 RX ORDER — DICLOFENAC SODIUM 75 MG/1
TABLET, DELAYED RELEASE ORAL
Qty: 180 TABLET | Refills: 0 | Status: SHIPPED | OUTPATIENT
Start: 2018-06-01 | End: 2018-07-31 | Stop reason: SDUPTHER

## 2018-06-01 RX ORDER — DICLOFENAC SODIUM 75 MG/1
TABLET, DELAYED RELEASE ORAL
Qty: 180 TABLET | Refills: 0 | OUTPATIENT
Start: 2018-06-01

## 2018-06-21 ENCOUNTER — OFFICE VISIT (OUTPATIENT)
Dept: PAIN MANAGEMENT | Age: 63
End: 2018-06-21

## 2018-06-21 VITALS — DIASTOLIC BLOOD PRESSURE: 74 MMHG | SYSTOLIC BLOOD PRESSURE: 120 MMHG | HEART RATE: 68 BPM

## 2018-06-21 DIAGNOSIS — M79.7 FIBROMYALGIA: ICD-10-CM

## 2018-06-21 DIAGNOSIS — G89.4 CHRONIC PAIN SYNDROME: ICD-10-CM

## 2018-06-21 DIAGNOSIS — G89.29 CHRONIC NONINTRACTABLE HEADACHE, UNSPECIFIED HEADACHE TYPE: ICD-10-CM

## 2018-06-21 DIAGNOSIS — M17.0 PRIMARY OSTEOARTHRITIS OF BOTH KNEES: ICD-10-CM

## 2018-06-21 DIAGNOSIS — R51.9 CHRONIC NONINTRACTABLE HEADACHE, UNSPECIFIED HEADACHE TYPE: ICD-10-CM

## 2018-06-21 DIAGNOSIS — M51.37 DEGENERATION OF LUMBAR OR LUMBOSACRAL INTERVERTEBRAL DISC: ICD-10-CM

## 2018-06-21 PROCEDURE — 99213 OFFICE O/P EST LOW 20 MIN: CPT | Performed by: INTERNAL MEDICINE

## 2018-06-21 RX ORDER — GABAPENTIN 600 MG/1
TABLET ORAL
Qty: 360 TABLET | Refills: 0 | Status: SHIPPED | OUTPATIENT
Start: 2018-06-21 | End: 2018-07-31 | Stop reason: SDUPTHER

## 2018-06-21 RX ORDER — AMITRIPTYLINE HYDROCHLORIDE 25 MG/1
TABLET, FILM COATED ORAL
Qty: 180 TABLET | Refills: 0 | Status: SHIPPED | OUTPATIENT
Start: 2018-06-21 | End: 2018-07-31 | Stop reason: SDUPTHER

## 2018-06-21 RX ORDER — TOPIRAMATE 25 MG/1
TABLET ORAL
Qty: 60 TABLET | Refills: 1 | Status: SHIPPED | OUTPATIENT
Start: 2018-06-21 | End: 2018-07-31 | Stop reason: SDUPTHER

## 2018-06-21 RX ORDER — OXYCODONE HYDROCHLORIDE 5 MG/1
5 TABLET ORAL EVERY 6 HOURS PRN
Qty: 126 TABLET | Refills: 0 | Status: SHIPPED | OUTPATIENT
Start: 2018-06-21 | End: 2018-07-31 | Stop reason: SDUPTHER

## 2018-06-21 RX ORDER — MORPHINE SULFATE 30 MG/1
30 TABLET, FILM COATED, EXTENDED RELEASE ORAL DAILY
Qty: 42 TABLET | Refills: 0 | Status: SHIPPED | OUTPATIENT
Start: 2018-06-21 | End: 2018-07-31 | Stop reason: SDUPTHER

## 2018-07-06 DIAGNOSIS — M17.0 PRIMARY OSTEOARTHRITIS OF BOTH KNEES: ICD-10-CM

## 2018-07-06 DIAGNOSIS — M51.37 DEGENERATION OF LUMBAR OR LUMBOSACRAL INTERVERTEBRAL DISC: ICD-10-CM

## 2018-07-06 DIAGNOSIS — M79.7 FIBROMYALGIA: ICD-10-CM

## 2018-07-06 DIAGNOSIS — G89.4 CHRONIC PAIN SYNDROME: ICD-10-CM

## 2018-07-06 RX ORDER — GABAPENTIN 600 MG/1
TABLET ORAL
Qty: 360 TABLET | Refills: 0 | OUTPATIENT
Start: 2018-07-06

## 2018-07-31 ENCOUNTER — OFFICE VISIT (OUTPATIENT)
Dept: PAIN MANAGEMENT | Age: 63
End: 2018-07-31

## 2018-07-31 VITALS
SYSTOLIC BLOOD PRESSURE: 114 MMHG | BODY MASS INDEX: 39.86 KG/M2 | WEIGHT: 225 LBS | DIASTOLIC BLOOD PRESSURE: 66 MMHG | HEART RATE: 64 BPM

## 2018-07-31 DIAGNOSIS — M51.37 DEGENERATION OF LUMBAR OR LUMBOSACRAL INTERVERTEBRAL DISC: ICD-10-CM

## 2018-07-31 DIAGNOSIS — R51.9 CHRONIC NONINTRACTABLE HEADACHE, UNSPECIFIED HEADACHE TYPE: ICD-10-CM

## 2018-07-31 DIAGNOSIS — G89.29 CHRONIC NONINTRACTABLE HEADACHE, UNSPECIFIED HEADACHE TYPE: ICD-10-CM

## 2018-07-31 DIAGNOSIS — G89.4 CHRONIC PAIN SYNDROME: ICD-10-CM

## 2018-07-31 DIAGNOSIS — M17.0 PRIMARY OSTEOARTHRITIS OF BOTH KNEES: ICD-10-CM

## 2018-07-31 DIAGNOSIS — M79.7 FIBROMYALGIA: ICD-10-CM

## 2018-07-31 PROCEDURE — 99213 OFFICE O/P EST LOW 20 MIN: CPT | Performed by: INTERNAL MEDICINE

## 2018-07-31 RX ORDER — TOPIRAMATE 25 MG/1
TABLET ORAL
Qty: 60 TABLET | Refills: 1 | Status: SHIPPED | OUTPATIENT
Start: 2018-07-31 | End: 2018-09-11 | Stop reason: SDUPTHER

## 2018-07-31 RX ORDER — MORPHINE SULFATE 30 MG/1
30 TABLET, FILM COATED, EXTENDED RELEASE ORAL DAILY
Qty: 42 TABLET | Refills: 0 | Status: SHIPPED | OUTPATIENT
Start: 2018-07-31 | End: 2018-09-11 | Stop reason: SDUPTHER

## 2018-07-31 RX ORDER — OXYCODONE HYDROCHLORIDE 5 MG/1
5 TABLET ORAL EVERY 6 HOURS PRN
Qty: 126 TABLET | Refills: 0 | Status: SHIPPED | OUTPATIENT
Start: 2018-07-31 | End: 2018-09-11 | Stop reason: SDUPTHER

## 2018-07-31 RX ORDER — AMITRIPTYLINE HYDROCHLORIDE 25 MG/1
TABLET, FILM COATED ORAL
Qty: 180 TABLET | Refills: 0 | Status: SHIPPED | OUTPATIENT
Start: 2018-07-31 | End: 2018-09-11 | Stop reason: SDUPTHER

## 2018-07-31 RX ORDER — DICLOFENAC SODIUM 75 MG/1
TABLET, DELAYED RELEASE ORAL
Qty: 180 TABLET | Refills: 0 | Status: SHIPPED | OUTPATIENT
Start: 2018-07-31 | End: 2018-10-29

## 2018-07-31 RX ORDER — GABAPENTIN 600 MG/1
TABLET ORAL
Qty: 360 TABLET | Refills: 0 | Status: SHIPPED | OUTPATIENT
Start: 2018-07-31 | End: 2018-09-11 | Stop reason: SDUPTHER

## 2018-07-31 NOTE — PROGRESS NOTES
Raul [facility]  1955  [id number]    HISTORY OF PRESENT ILLNESS:  Ms. Pennie James is a 70 y.o. female returns for a follow up visit for multiple medical problems. Her current presenting problems are   1. Chronic pain syndrome    2. Fibromyalgia    3. Degeneration of lumbar or lumbosacral intervertebral disc    4. Recurrent major depressive disorder, in partial remission ([address]    5. Chronic nonintractable headache, unspecified headache type    6. Primary insomnia    7. Gastroesophageal reflux disease without esophagitis    . As per information/history obtained from the PADT(patient assessment and documentation tool) - She complains of pain in the neck and lower back with radiation to the shoulders Bilateral, elbows Bilateral, hands Bilateral, buttocks, hips Bilateral, upper leg Bilateral and knees Bilateral She rates the pain 6/10 and describes it as sharp, aching. Pain is made worse by: movement, walking, standing, sitting, bending, lifting. Current treatment regimen has helped relieve about 60% of the pain. She denies side effects from the current pain regimen. Patient reports that since the last follow up visit the physical functioning is unchanged, family/social relationships are unchanged, mood is unchanged and sleep patterns are unchanged, and that the overall functioning is unchanged. Patient denies neurological bowel or bladder. Patient denies misusing/abusing her narcotic pain medications or using any illegal drugs. There are No indicators for possible drug abuse, addiction or diversion problems. Upon obtaining the medical history from Ms. Pennie James regarding today's office visit for her presenting problems,  Patient reports her pain has been baseline and tolerable with her medications. she states that the medications are helping with the headaches  and they are tolerable. Denies nausea, vomiting, sonophobia, photophobia, photopsia, diplopia, vertigo, neck stiffness.  She says she is using Topamax 25 mg BID and is helping with her headaches. She mentions she is using Ms Contin with Oxycodone for BTP. She states her weight has been stable. She reports she is managing some of her house chores. She says she has been walking daily. ALLERGIES: Patients list of allergies were reviewed     MEDICATIONS: Ms. Letty Yee list of medications were reviewed. Her current medications are   Outpatient Medications Prior to Visit   Medication Sig Dispense Refill    morphine (MS CONTIN) 30 MG extended release tablet Take 1 tablet by mouth daily for 42 days. . 42 tablet 0    oxyCODONE (ROXICODONE) 5 MG immediate release tablet Take 1 tablet by mouth every 6 hours as needed for Pain (max 3 per day) for up to 42 days. . 126 tablet 0    topiramate (TOPAMAX) 25 MG tablet TAKE ONE TABLET BY MOUTH TWICE A DAY 60 tablet 1    amitriptyline (ELAVIL) 25 MG tablet TAKE ONE TO TWO TABLETS BY MOUTH ONCE NIGHTLY 180 tablet 0    diclofenac (VOLTAREN) 75 MG EC tablet Take one tablet po BID for two weeks, and then  tablet 0    FLUoxetine (PROZAC) 40 MG capsule       ranitidine (ZANTAC) 300 MG tablet       Prenatal Vit-Fe Fumarate-FA (PNV PRENATAL PLUS MULTIVITAMIN) 27-1 MG TABS       Prenatal Vit-DSS-Fe Cbn-FA (VIRT-CHAN GT) 90-1 MG TABS       omeprazole (PRILOSEC) 40 MG capsule Take 40 mg by mouth daily.  KLOR-CON M20 20 MEQ tablet TAKE TWO TABLETS BY MOUTH TWICE A  tablet 3    VIVELLE-DOT 0.05 MG/24HR APPLY 1 PATCH TO SKIN TWICE WEEKLY 8 patch 11    furosemide (LASIX) 20 MG tablet Take 20 mg by mouth 3 times daily.  therapeutic multivitamin-minerals (THERAGRAN-M) tablet Take 1 tablet by mouth daily.  gabapentin (NEURONTIN) 600 MG tablet Take 1 tab in the am, Take 1 tab in the afternoon, and then take 2 tabs in the pm. 360 tablet 0     No facility-administered medications prior to visit.         SOCIAL/FAMILY/PAST MEDICAL HISTORY: Ms. Olga Cross, family and past medical history was reviewed. REVIEW OF SYSTEMS:    Respiratory: Negative for apnea, chest tightness and shortness of breath or change in baseline breathing. Gastrointestinal: Negative for nausea, vomiting, abdominal pain, diarrhea, constipation, blood in stool and abdominal distention. PHYSICAL EXAM:   Nursing note and vitals reviewed. /66   Pulse 64   Wt 225 lb (102.1 kg)   BMI 39.86 kg/m²   Constitutional: She appears well-developed and well-nourished. No acute distress. Skin: Skin is warm and dry, good turgor. No rash noted. She is not diaphoretic. Cardiovascular: Normal rate, regular rhythm, normal heart sounds, and does not have murmur. Pulmonary/Chest: Effort normal. No respiratory distress. She does not have wheezes in the lung fields. She has no rales. Neurological/Psychiatric:She is alert and oriented to person, place, and time. Coordination is  normal. Her mood isAppropriate and affect is Neutral/Euthymic(normal) . IMPRESSION:   1. Degeneration of lumbar or lumbosacral intervertebral disc    2. Fibromyalgia    3. Chronic pain syndrome    4. Chronic nonintractable headache, unspecified headache type        PLAN:  Informed verbal consent was obtained  -Continue with current regimen   -ROM/Stretching exercises   - Continue with Ms Contin and Oxycodone for BTP 3 per day   -She was advised weight reduction, diet changes- 800-1200 idalia diet, diet diary, exercising, nutritional  consult increased physical activity as tolerated   -Advise walking 20-30 minutes daily   -she was advised  to avoid using too many OTC analgesics to control the headaches, avoid chocolates, increased caffeine, cheeses and MSG nitrite containing foods, cigarette smoking. To avoid bright lights, strong smells and skipping meals. Current Outpatient Prescriptions   Medication Sig Dispense Refill    morphine (MS CONTIN) 30 MG extended release tablet Take 1 tablet by mouth daily for 42 days. . 42 tablet 0    oxyCODONE

## 2018-08-26 DIAGNOSIS — M17.0 PRIMARY OSTEOARTHRITIS OF BOTH KNEES: ICD-10-CM

## 2018-08-26 DIAGNOSIS — M79.7 FIBROMYALGIA: ICD-10-CM

## 2018-08-26 DIAGNOSIS — M51.37 DEGENERATION OF LUMBAR OR LUMBOSACRAL INTERVERTEBRAL DISC: ICD-10-CM

## 2018-08-26 DIAGNOSIS — G89.4 CHRONIC PAIN SYNDROME: ICD-10-CM

## 2018-08-28 RX ORDER — DICLOFENAC SODIUM 75 MG/1
TABLET, DELAYED RELEASE ORAL
Qty: 180 TABLET | Refills: 0 | Status: SHIPPED | OUTPATIENT
Start: 2018-08-28 | End: 2018-10-29 | Stop reason: SDUPTHER

## 2018-09-11 ENCOUNTER — OFFICE VISIT (OUTPATIENT)
Dept: PAIN MANAGEMENT | Age: 63
End: 2018-09-11

## 2018-09-11 VITALS
BODY MASS INDEX: 39.86 KG/M2 | WEIGHT: 225 LBS | HEART RATE: 70 BPM | DIASTOLIC BLOOD PRESSURE: 73 MMHG | SYSTOLIC BLOOD PRESSURE: 123 MMHG

## 2018-09-11 DIAGNOSIS — G89.4 CHRONIC PAIN SYNDROME: ICD-10-CM

## 2018-09-11 DIAGNOSIS — M79.7 FIBROMYALGIA: ICD-10-CM

## 2018-09-11 DIAGNOSIS — M17.0 PRIMARY OSTEOARTHRITIS OF BOTH KNEES: ICD-10-CM

## 2018-09-11 DIAGNOSIS — M19.90 ARTHRITIS: ICD-10-CM

## 2018-09-11 DIAGNOSIS — M51.37 DEGENERATION OF LUMBAR OR LUMBOSACRAL INTERVERTEBRAL DISC: ICD-10-CM

## 2018-09-11 DIAGNOSIS — R51.9 CHRONIC NONINTRACTABLE HEADACHE, UNSPECIFIED HEADACHE TYPE: ICD-10-CM

## 2018-09-11 DIAGNOSIS — G89.29 CHRONIC NONINTRACTABLE HEADACHE, UNSPECIFIED HEADACHE TYPE: ICD-10-CM

## 2018-09-11 DIAGNOSIS — K43.2 RECURRENT INCISIONAL HERNIA WITH COMPLICATION: ICD-10-CM

## 2018-09-11 PROCEDURE — 99213 OFFICE O/P EST LOW 20 MIN: CPT | Performed by: INTERNAL MEDICINE

## 2018-09-11 RX ORDER — MORPHINE SULFATE 30 MG/1
30 TABLET, FILM COATED, EXTENDED RELEASE ORAL DAILY
Qty: 42 TABLET | Refills: 0 | Status: SHIPPED | OUTPATIENT
Start: 2018-09-11 | End: 2018-10-29 | Stop reason: SDUPTHER

## 2018-09-11 RX ORDER — AMITRIPTYLINE HYDROCHLORIDE 25 MG/1
TABLET, FILM COATED ORAL
Qty: 180 TABLET | Refills: 0 | Status: SHIPPED | OUTPATIENT
Start: 2018-09-11 | End: 2018-10-29 | Stop reason: SDUPTHER

## 2018-09-11 RX ORDER — GABAPENTIN 600 MG/1
TABLET ORAL
Qty: 360 TABLET | Refills: 0 | Status: SHIPPED | OUTPATIENT
Start: 2018-09-11 | End: 2018-10-29

## 2018-09-11 RX ORDER — OXYCODONE HYDROCHLORIDE 5 MG/1
5 TABLET ORAL EVERY 6 HOURS PRN
Qty: 126 TABLET | Refills: 0 | Status: SHIPPED | OUTPATIENT
Start: 2018-09-11 | End: 2018-10-29 | Stop reason: SDUPTHER

## 2018-09-11 RX ORDER — TOPIRAMATE 25 MG/1
TABLET ORAL
Qty: 60 TABLET | Refills: 1 | Status: SHIPPED | OUTPATIENT
Start: 2018-09-11 | End: 2018-10-29 | Stop reason: SDUPTHER

## 2018-09-11 NOTE — PROGRESS NOTES
Martha Sosanatividad  1955  [id number]    HISTORY OF PRESENT ILLNESS:  Ms. Jaclyn Jurado is a 70 y.o. female returns for a follow up visit for multiple medical problems. Her current presenting problems are   1. Chronic pain syndrome    2. Arthritis    3. Fibromyalgia    4. Degeneration of lumbar or lumbosacral intervertebral disc    5. Recurrent major depressive disorder, in partial remission ([address]    6. Primary osteoarthritis of both knees    7. Chronic tension-type headache, not intractable    8. Primary insomnia    9. Recurrent incisional hernia with complication    10. Gastroesophageal reflux disease without esophagitis    . As per information/history obtained from the PADT(patient assessment and documentation tool) - She complains of pain in the shoulders Bilateral, lower back and knees Bilateral with radiation to the hands Bilateral, buttocks, ankles Bilateral and feet Bilateral She rates the pain 7/10 and describes it as aching, throbbing, numbness, pins and needles. Pain is made worse by: movement, walking, standing, sitting. Current treatment regimen has helped relieve about 60% of the pain. She denies side effects from the current pain regimen. Patient reports that since the last follow up visit the physical functioning is unchanged, family/social relationships are unchanged, mood is unchanged and sleep patterns are unchanged, and that the overall functioning is unchanged. Patient denies neurological bowel or bladder. Patient denies misusing/abusing her narcotic pain medications or using any illegal drugs. There are No indicators for possible drug abuse, addiction or diversion problems. Upon obtaining the medical history from Ms. Jaclyn Jurado regarding today's office visit for her presenting problems, patient states she has been doing fair, managing with her medications. Ms. Jaclyn Jurado says she is using Ms Contin along with Oxycodone.  She mentions her back/knees hurt the most along with pain in the abdomen wall from her hernia mesh. Patient denies any constipation symptoms. ALLERGIES: Patients list of allergies were reviewed     MEDICATIONS: Ms. Kenn Oscar list of medications were reviewed. Her current medications are   Outpatient Medications Prior to Visit   Medication Sig Dispense Refill    diclofenac (VOLTAREN) 75 MG EC tablet TAKE ONE TABLET BY MOUTH TWICE A DAY FOR TWO WEEKS, THEN AS NEEDED 180 tablet 0    morphine (MS CONTIN) 30 MG extended release tablet Take 1 tablet by mouth daily for 42 days. . 42 tablet 0    oxyCODONE (ROXICODONE) 5 MG immediate release tablet Take 1 tablet by mouth every 6 hours as needed for Pain (max 3 per day) for up to 42 days. . 126 tablet 0    topiramate (TOPAMAX) 25 MG tablet TAKE ONE TABLET BY MOUTH TWICE A DAY 60 tablet 1    amitriptyline (ELAVIL) 25 MG tablet TAKE ONE TO TWO TABLETS BY MOUTH ONCE NIGHTLY 180 tablet 0    diclofenac (VOLTAREN) 75 MG EC tablet Take one tablet po BID  tablet 0    FLUoxetine (PROZAC) 40 MG capsule       ranitidine (ZANTAC) 300 MG tablet       Prenatal Vit-Fe Fumarate-FA (PNV PRENATAL PLUS MULTIVITAMIN) 27-1 MG TABS       Prenatal Vit-DSS-Fe Cbn-FA (VIRT-CHAN GT) 90-1 MG TABS       omeprazole (PRILOSEC) 40 MG capsule Take 40 mg by mouth daily.  KLOR-CON M20 20 MEQ tablet TAKE TWO TABLETS BY MOUTH TWICE A  tablet 3    VIVELLE-DOT 0.05 MG/24HR APPLY 1 PATCH TO SKIN TWICE WEEKLY 8 patch 11    furosemide (LASIX) 20 MG tablet Take 20 mg by mouth 3 times daily.  therapeutic multivitamin-minerals (THERAGRAN-M) tablet Take 1 tablet by mouth daily.  gabapentin (NEURONTIN) 600 MG tablet Take 1 tab in the am, Take 1 tab in the afternoon, and then take 2 tabs in the pm. 360 tablet 0     No facility-administered medications prior to visit. SOCIAL/FAMILY/PAST MEDICAL HISTORY: Ms. Jeet Hac, family and past medical history was reviewed.      REVIEW OF SYSTEMS:    Respiratory: Negative for apnea, chest pertinent to this visit. Risks and benefits of the medications and other alternative treatments  including no treatment were discussed with the patient. The common side effects of these medications were also explained to the patient. Informed verbal consent was obtained. Goals of current treatment regimen include improvement in pain, restoration of functioning- with focus on improvement in physical performance, general activity, work or disability,emotional distress, health care utilization and  decreased medication consumption. Will continue to monitor progress towards achieving/maintaining therapeutic goals with special emphasis on  1. Improvement in perceived interfernce  of pain with ADL's. Ability to do home exercises independently. Ability to do household chores indoor and/or outdoor work and social and leisure activities. Improve psychosocial and physical functioning. - she is showing progression towards this treatment goal with the current regimen. She was advised against drinking alcohol with the narcotic pain medicines, advised against driving or handling machinery while adjusting the dose of medicines or if having cognitive  issues related to the current medications. Risk of overdose and death, if medicines not taken as prescribed, were also discussed. If the patient develops new symptoms or if the symptoms worsen, the patient should call the office. While transcribing every attempt was made to maintain the accuracy of the note in terms of it's contents,there may have been some errors made inadvertently. Thank you for allowing me to participate in the care of this patient. Humberto Barajas MD.    Cc: MD KELVIN Robles Weber Salmons, scribing for in the presence  of Dr. Humberto Barajas.   09/11/18  1:54 PM  Irving Coats.  Apple Lara Assistant  I, Dr. Humberto Barajas, personally performed the services described in this documentation as scribed by  Valeria Miller MA in my presence and it is

## 2018-09-21 ENCOUNTER — HOSPITAL ENCOUNTER (OUTPATIENT)
Dept: MAMMOGRAPHY | Age: 63
Discharge: HOME | End: 2018-09-21
Payer: MEDICARE

## 2018-09-21 DIAGNOSIS — Z12.31 SCREENING MAMMOGRAM, ENCOUNTER FOR: ICD-10-CM

## 2018-09-21 PROCEDURE — 77067 SCR MAMMO BI INCL CAD: CPT

## 2018-10-04 DIAGNOSIS — M17.0 PRIMARY OSTEOARTHRITIS OF BOTH KNEES: ICD-10-CM

## 2018-10-04 DIAGNOSIS — G89.4 CHRONIC PAIN SYNDROME: ICD-10-CM

## 2018-10-04 DIAGNOSIS — R51.9 CHRONIC NONINTRACTABLE HEADACHE, UNSPECIFIED HEADACHE TYPE: ICD-10-CM

## 2018-10-04 DIAGNOSIS — M79.7 FIBROMYALGIA: ICD-10-CM

## 2018-10-04 DIAGNOSIS — G89.29 CHRONIC NONINTRACTABLE HEADACHE, UNSPECIFIED HEADACHE TYPE: ICD-10-CM

## 2018-10-04 DIAGNOSIS — M51.37 DEGENERATION OF LUMBAR OR LUMBOSACRAL INTERVERTEBRAL DISC: ICD-10-CM

## 2018-10-05 RX ORDER — GABAPENTIN 600 MG/1
TABLET ORAL
Qty: 360 TABLET | Refills: 0 | Status: SHIPPED | OUTPATIENT
Start: 2018-10-05 | End: 2018-10-29 | Stop reason: SDUPTHER

## 2018-10-29 ENCOUNTER — OFFICE VISIT (OUTPATIENT)
Dept: PAIN MANAGEMENT | Age: 63
End: 2018-10-29
Payer: MEDICARE

## 2018-10-29 VITALS
DIASTOLIC BLOOD PRESSURE: 77 MMHG | SYSTOLIC BLOOD PRESSURE: 128 MMHG | HEART RATE: 64 BPM | BODY MASS INDEX: 39.86 KG/M2 | WEIGHT: 225 LBS

## 2018-10-29 DIAGNOSIS — G89.29 CHRONIC NONINTRACTABLE HEADACHE, UNSPECIFIED HEADACHE TYPE: ICD-10-CM

## 2018-10-29 DIAGNOSIS — M51.37 DEGENERATION OF LUMBAR OR LUMBOSACRAL INTERVERTEBRAL DISC: ICD-10-CM

## 2018-10-29 DIAGNOSIS — M79.7 FIBROMYALGIA: ICD-10-CM

## 2018-10-29 DIAGNOSIS — R51.9 CHRONIC NONINTRACTABLE HEADACHE, UNSPECIFIED HEADACHE TYPE: ICD-10-CM

## 2018-10-29 DIAGNOSIS — F33.41 RECURRENT MAJOR DEPRESSIVE DISORDER, IN PARTIAL REMISSION (HCC): ICD-10-CM

## 2018-10-29 DIAGNOSIS — G44.229 CHRONIC TENSION-TYPE HEADACHE, NOT INTRACTABLE: ICD-10-CM

## 2018-10-29 DIAGNOSIS — G89.4 CHRONIC PAIN SYNDROME: ICD-10-CM

## 2018-10-29 DIAGNOSIS — F51.01 PRIMARY INSOMNIA: ICD-10-CM

## 2018-10-29 DIAGNOSIS — M17.0 PRIMARY OSTEOARTHRITIS OF BOTH KNEES: ICD-10-CM

## 2018-10-29 DIAGNOSIS — K21.9 GASTROESOPHAGEAL REFLUX DISEASE WITHOUT ESOPHAGITIS: ICD-10-CM

## 2018-10-29 PROCEDURE — 99213 OFFICE O/P EST LOW 20 MIN: CPT | Performed by: INTERNAL MEDICINE

## 2018-10-29 RX ORDER — AMITRIPTYLINE HYDROCHLORIDE 25 MG/1
TABLET, FILM COATED ORAL
Qty: 180 TABLET | Refills: 0 | Status: SHIPPED | OUTPATIENT
Start: 2018-10-29 | End: 2019-01-21 | Stop reason: SDUPTHER

## 2018-10-29 RX ORDER — DICLOFENAC SODIUM 75 MG/1
TABLET, DELAYED RELEASE ORAL
Qty: 180 TABLET | Refills: 0 | Status: SHIPPED | OUTPATIENT
Start: 2018-10-29 | End: 2019-01-21 | Stop reason: SDUPTHER

## 2018-10-29 RX ORDER — OXYCODONE HYDROCHLORIDE 5 MG/1
5 TABLET ORAL EVERY 6 HOURS PRN
Qty: 126 TABLET | Refills: 0 | Status: SHIPPED | OUTPATIENT
Start: 2018-10-29 | End: 2018-12-10 | Stop reason: SDUPTHER

## 2018-10-29 RX ORDER — TOPIRAMATE 25 MG/1
TABLET ORAL
Qty: 60 TABLET | Refills: 1 | Status: SHIPPED | OUTPATIENT
Start: 2018-10-29 | End: 2018-12-10 | Stop reason: SDUPTHER

## 2018-10-29 RX ORDER — MORPHINE SULFATE 30 MG/1
30 TABLET, FILM COATED, EXTENDED RELEASE ORAL DAILY
Qty: 42 TABLET | Refills: 0 | Status: SHIPPED | OUTPATIENT
Start: 2018-10-29 | End: 2018-12-10 | Stop reason: SDUPTHER

## 2018-10-29 RX ORDER — GABAPENTIN 600 MG/1
TABLET ORAL
Qty: 360 TABLET | Refills: 0 | Status: SHIPPED | OUTPATIENT
Start: 2018-10-29 | End: 2018-12-10 | Stop reason: SDUPTHER

## 2018-10-29 NOTE — PROGRESS NOTES
MG tablet TAKE ONE TABLET BY MOUTH TWICE A DAY 60 tablet 1    amitriptyline (ELAVIL) 25 MG tablet TAKE ONE TO TWO TABLETS BY MOUTH ONCE NIGHTLY 180 tablet 0    diclofenac (VOLTAREN) 75 MG EC tablet TAKE ONE TABLET BY MOUTH TWICE A DAY FOR TWO WEEKS, THEN AS NEEDED 180 tablet 0    FLUoxetine (PROZAC) 40 MG capsule       ranitidine (ZANTAC) 300 MG tablet       Prenatal Vit-Fe Fumarate-FA (PNV PRENATAL PLUS MULTIVITAMIN) 27-1 MG TABS       Prenatal Vit-DSS-Fe Cbn-FA (VIRT-CHAN GT) 90-1 MG TABS       omeprazole (PRILOSEC) 40 MG capsule Take 40 mg by mouth daily.  KLOR-CON M20 20 MEQ tablet TAKE TWO TABLETS BY MOUTH TWICE A  tablet 3    VIVELLE-DOT 0.05 MG/24HR APPLY 1 PATCH TO SKIN TWICE WEEKLY 8 patch 11    furosemide (LASIX) 20 MG tablet Take 20 mg by mouth 3 times daily.  therapeutic multivitamin-minerals (THERAGRAN-M) tablet Take 1 tablet by mouth daily. No current facility-administered medications for this visit. I will continue her current medication regimen  which is part of the above treatment schedule. It has been helping with Ms. Harris's chronic  medical problems which for this visit include:   Diagnoses of Chronic pain syndrome, Fibromyalgia, Degeneration of lumbar or lumbosacral intervertebral disc, Recurrent major depressive disorder, in partial remission ([address] Primary osteoarthritis of both knees, Chronic tension-type headache, not intractable, Primary insomnia, and Gastroesophageal reflux disease without esophagitis were pertinent to this visit. Risks and benefits of the medications and other alternative treatments  including no treatment were discussed with the patient. The common side effects of these medications were also explained to the patient. Informed verbal consent was obtained.    Goals of current treatment regimen include improvement in pain, restoration of functioning- with focus on improvement in physical performance, general activity, work or disability,emotional distress, health care utilization and  decreased medication consumption. Will continue to monitor progress towards achieving/maintaining therapeutic goals with special emphasis on  1. Improvement in perceived interfernce  of pain with ADL's. Ability to do home exercises independently. Ability to do household chores indoor and/or outdoor work and social and leisure activities. Improve psychosocial and physical functioning. - she is showing progression towards this treatment goal with the current regimen. She was advised against drinking alcohol with the narcotic pain medicines, advised against driving or handling machinery while adjusting the dose of medicines or if having cognitive  issues related to the current medications. Risk of overdose and death, if medicines not taken as prescribed, were also discussed. If the patient develops new symptoms or if the symptoms worsen, the patient should call the office. While transcribing every attempt was made to maintain the accuracy of the note in terms of it's contents,there may have been some errors made inadvertently. Thank you for allowing me to participate in the care of this patient.     Carmen Fernandez MD.    Cc: Edward Guevara MD

## 2018-12-10 ENCOUNTER — OFFICE VISIT (OUTPATIENT)
Dept: PAIN MANAGEMENT | Age: 63
End: 2018-12-10
Payer: MEDICARE

## 2018-12-10 VITALS
SYSTOLIC BLOOD PRESSURE: 119 MMHG | DIASTOLIC BLOOD PRESSURE: 71 MMHG | BODY MASS INDEX: 38.62 KG/M2 | WEIGHT: 218 LBS | HEART RATE: 64 BPM

## 2018-12-10 DIAGNOSIS — M51.37 DEGENERATION OF LUMBAR OR LUMBOSACRAL INTERVERTEBRAL DISC: ICD-10-CM

## 2018-12-10 DIAGNOSIS — M79.7 FIBROMYALGIA: ICD-10-CM

## 2018-12-10 DIAGNOSIS — M17.0 PRIMARY OSTEOARTHRITIS OF BOTH KNEES: ICD-10-CM

## 2018-12-10 DIAGNOSIS — G89.4 CHRONIC PAIN SYNDROME: ICD-10-CM

## 2018-12-10 DIAGNOSIS — G44.229 CHRONIC TENSION-TYPE HEADACHE, NOT INTRACTABLE: ICD-10-CM

## 2018-12-10 PROCEDURE — 99213 OFFICE O/P EST LOW 20 MIN: CPT | Performed by: INTERNAL MEDICINE

## 2018-12-10 RX ORDER — TOPIRAMATE 25 MG/1
TABLET ORAL
Qty: 60 TABLET | Refills: 1 | Status: SHIPPED | OUTPATIENT
Start: 2018-12-10 | End: 2019-01-21 | Stop reason: SDUPTHER

## 2018-12-10 RX ORDER — OXYCODONE HYDROCHLORIDE 5 MG/1
5 TABLET ORAL EVERY 6 HOURS PRN
Qty: 126 TABLET | Refills: 0 | Status: SHIPPED | OUTPATIENT
Start: 2018-12-10 | End: 2019-01-21 | Stop reason: SDUPTHER

## 2018-12-10 RX ORDER — GABAPENTIN 600 MG/1
TABLET ORAL
Qty: 360 TABLET | Refills: 0 | Status: SHIPPED | OUTPATIENT
Start: 2018-12-10 | End: 2019-01-21 | Stop reason: SDUPTHER

## 2018-12-10 RX ORDER — MORPHINE SULFATE 30 MG/1
30 TABLET, FILM COATED, EXTENDED RELEASE ORAL DAILY
Qty: 42 TABLET | Refills: 0 | Status: SHIPPED | OUTPATIENT
Start: 2018-12-10 | End: 2019-01-21 | Stop reason: SDUPTHER

## 2018-12-10 NOTE — PROGRESS NOTES
Naomie Plasencia  1955  [id number]      HISTORY OF PRESENT ILLNESS:  Ms. Franci Serra is a 70 y.o. female returns for a follow up visit for pain management  She has a diagnosis of   1. Chronic pain syndrome    2. Fibromyalgia    3. Degeneration of lumbar or lumbosacral intervertebral disc    4. Recurrent major depressive disorder, in partial remission ([address]    5. Primary osteoarthritis of both knees    6. Chronic tension-type headache, not intractable    7. Primary insomnia    . She complains of pain all over  She rates the pain 6/10 and describes it as sharp. Current treatment regimen has helped relieve about 60% of the pain. She denies any side effects from the current pain regimen. Patient reports that since the last follow up visit the physical functioning is unchanged, family/social relationships are unchanged, mood is unchanged sleep patterns are unchanged, and that the overall functioning is unchanged. Patient denies misusing/abusing her narcotic pain medications or using any illegal drugs. There are No indicators for possible drug abuse, addiction or diversion problems. Patient reports that the pain is fairly well tolerated with the current regimen has had some exacerbations, but overall has been tolerable. Ms. Franci Serra states that  she has been staying with her exercise routine and working indoors and/or outdoors as tolerated, performing household chores. She says she has been dong fair and managing with the medications. She denies any constipation symptoms. She mentions her headache symptoms have been tolerable, and is using Topamax still. She reports her weight has been stable. Ms. Franci Serra states she is managing all her house chores. She complains her back pain is greater than the knee pain. ALLERGIES: Patients list of allergies were reviewed     MEDICATIONS: Ms. Franci Serra list of medications were reviewed. Her current medications are   Outpatient Medications Prior to Visit   Medication Constitutional: She appears well-developed and well-nourished. No acute distress. Skin: Skin is warm and dry, good turgor. No rash noted. She is not diaphoretic. Cardiovascular: Normal rate, regular rhythm, normal heart sounds, and does not have murmur. Pulmonary/Chest: Effort normal. No respiratory distress. She does not have wheezes in the lung fields. She has no rales. Neurological/Psychiatric:She is alert and oriented to person, place, and time. Coordination is  normal. Her mood isAppropriate and affect is Neutral/Euthymic(normal) . IMPRESSION:   1. Chronic pain syndrome    2. Fibromyalgia    3. Degeneration of lumbar or lumbosacral intervertebral disc    4. Primary osteoarthritis of both knees    5. Chronic tension-type headache, not intractable        PLAN:  Informed verbal consent was obtained  -Continue with current regimen   -ROM exercises as advised   -Continue with Ms Contin with Oxycodone for btp 3 per day   -She was advised weight reduction, diet changes- 800-1200 idalia diet, diet diary, exercising, nutritional  consult increased physical activity as tolerated   -walk 20 minutes daily   -She was advised to increase fluids ( 5-7  glasses of fluid daily), limit caffeine, avoid cheese products, increase dietary fiber, increase activity and exercise as tolerated and relax regularly and enjoy meals    Current Outpatient Prescriptions   Medication Sig Dispense Refill    topiramate (TOPAMAX) 25 MG tablet TAKE ONE TABLET BY MOUTH TWICE A DAY 60 tablet 1    amitriptyline (ELAVIL) 25 MG tablet TAKE ONE TO TWO TABLETS BY MOUTH ONCE NIGHTLY 180 tablet 0    diclofenac (VOLTAREN) 75 MG EC tablet TAKE ONE TABLET BY MOUTH TWICE A DAY FOR TWO WEEKS, THEN AS NEEDED 180 tablet 0    morphine (MS CONTIN) 30 MG extended release tablet Take 1 tablet by mouth daily for 42 days. . 42 tablet 0    oxyCODONE (ROXICODONE) 5 MG immediate release tablet Take 1 tablet by mouth every 6 hours as needed for Pain (max 3 per day) for up to 42 days. . 126 tablet 0    FLUoxetine (PROZAC) 40 MG capsule       ranitidine (ZANTAC) 300 MG tablet       Prenatal Vit-Fe Fumarate-FA (PNV PRENATAL PLUS MULTIVITAMIN) 27-1 MG TABS       Prenatal Vit-DSS-Fe Cbn-FA (VIRT-CHAN GT) 90-1 MG TABS       omeprazole (PRILOSEC) 40 MG capsule Take 40 mg by mouth daily.  KLOR-CON M20 20 MEQ tablet TAKE TWO TABLETS BY MOUTH TWICE A  tablet 3    VIVELLE-DOT 0.05 MG/24HR APPLY 1 PATCH TO SKIN TWICE WEEKLY 8 patch 11    furosemide (LASIX) 20 MG tablet Take 20 mg by mouth 3 times daily.  therapeutic multivitamin-minerals (THERAGRAN-M) tablet Take 1 tablet by mouth daily.  gabapentin (NEURONTIN) 600 MG tablet TAKE ONE TABLET BY MOUTH EVERY MORNING, 1 TABLET IN THE AFTERNOON, AND TAKE TWO TABLETS BY MOUTH EVERY EVENING. 360 tablet 0     No current facility-administered medications for this visit. I will continue her current medication regimen  which is part of the above treatment schedule. It has been helping with Ms. Harris's chronic  medical problems which for this visit include:   Diagnoses of Chronic pain syndrome, Fibromyalgia, Degeneration of lumbar or lumbosacral intervertebral disc, Recurrent major depressive disorder, in partial remission ([address] Primary osteoarthritis of both knees, Chronic tension-type headache, not intractable, and Primary insomnia were pertinent to this visit. Risks and benefits of the medications and other alternative treatments  including no treatment were discussed with the patient. The common side effects of these medications were also explained to the patient. Informed verbal consent was obtained. Goals of current treatment regimen include improvement in pain, restoration of functioning- with focus on improvement in physical performance, general activity, work or disability,emotional distress, health care utilization and  decreased medication consumption.  Will continue to monitor progress

## 2019-01-21 ENCOUNTER — OFFICE VISIT (OUTPATIENT)
Dept: PAIN MANAGEMENT | Age: 64
End: 2019-01-21
Payer: MEDICARE

## 2019-01-21 VITALS
HEART RATE: 74 BPM | BODY MASS INDEX: 38.62 KG/M2 | WEIGHT: 218 LBS | SYSTOLIC BLOOD PRESSURE: 117 MMHG | DIASTOLIC BLOOD PRESSURE: 80 MMHG

## 2019-01-21 DIAGNOSIS — M51.37 DEGENERATION OF LUMBAR OR LUMBOSACRAL INTERVERTEBRAL DISC: ICD-10-CM

## 2019-01-21 DIAGNOSIS — M17.0 PRIMARY OSTEOARTHRITIS OF BOTH KNEES: ICD-10-CM

## 2019-01-21 DIAGNOSIS — F51.01 PRIMARY INSOMNIA: ICD-10-CM

## 2019-01-21 DIAGNOSIS — G44.229 CHRONIC TENSION-TYPE HEADACHE, NOT INTRACTABLE: ICD-10-CM

## 2019-01-21 DIAGNOSIS — F33.41 RECURRENT MAJOR DEPRESSIVE DISORDER, IN PARTIAL REMISSION (HCC): ICD-10-CM

## 2019-01-21 DIAGNOSIS — Z91.89 AT RISK FOR RESPIRATORY DEPRESSION DUE TO OPIOID: ICD-10-CM

## 2019-01-21 DIAGNOSIS — M79.7 FIBROMYALGIA: ICD-10-CM

## 2019-01-21 DIAGNOSIS — G89.4 CHRONIC PAIN SYNDROME: ICD-10-CM

## 2019-01-21 PROCEDURE — 99214 OFFICE O/P EST MOD 30 MIN: CPT | Performed by: INTERNAL MEDICINE

## 2019-01-21 RX ORDER — GABAPENTIN 600 MG/1
TABLET ORAL
Qty: 360 TABLET | Refills: 0 | Status: SHIPPED | OUTPATIENT
Start: 2019-01-21 | End: 2019-03-04 | Stop reason: SDUPTHER

## 2019-01-21 RX ORDER — OXYCODONE HYDROCHLORIDE 5 MG/1
5 TABLET ORAL EVERY 6 HOURS PRN
Qty: 126 TABLET | Refills: 0 | Status: SHIPPED | OUTPATIENT
Start: 2019-01-21 | End: 2019-03-04 | Stop reason: SDUPTHER

## 2019-01-21 RX ORDER — AMITRIPTYLINE HYDROCHLORIDE 25 MG/1
TABLET, FILM COATED ORAL
Qty: 180 TABLET | Refills: 0 | Status: SHIPPED | OUTPATIENT
Start: 2019-01-21 | End: 2019-03-04 | Stop reason: SDUPTHER

## 2019-01-21 RX ORDER — TOPIRAMATE 25 MG/1
TABLET ORAL
Qty: 60 TABLET | Refills: 1 | Status: SHIPPED | OUTPATIENT
Start: 2019-01-21 | End: 2019-03-04 | Stop reason: SDUPTHER

## 2019-01-21 RX ORDER — DICLOFENAC SODIUM 75 MG/1
TABLET, DELAYED RELEASE ORAL
Qty: 180 TABLET | Refills: 0 | Status: SHIPPED | OUTPATIENT
Start: 2019-01-21 | End: 2019-03-04 | Stop reason: SDUPTHER

## 2019-01-21 RX ORDER — NALOXONE HYDROCHLORIDE 2 MG/.4ML
INJECTION, SOLUTION INTRAMUSCULAR; SUBCUTANEOUS
Qty: 1 PACKAGE | Refills: 0 | Status: SHIPPED | OUTPATIENT
Start: 2019-01-21 | End: 2020-08-13 | Stop reason: ALTCHOICE

## 2019-01-21 RX ORDER — MORPHINE SULFATE 30 MG/1
30 TABLET, FILM COATED, EXTENDED RELEASE ORAL DAILY
Qty: 42 TABLET | Refills: 0 | Status: SHIPPED | OUTPATIENT
Start: 2019-01-21 | End: 2019-03-04 | Stop reason: SDUPTHER

## 2019-01-29 ENCOUNTER — TELEPHONE (OUTPATIENT)
Dept: PAIN MANAGEMENT | Age: 64
End: 2019-01-29

## 2019-03-04 ENCOUNTER — OFFICE VISIT (OUTPATIENT)
Dept: PAIN MANAGEMENT | Age: 64
End: 2019-03-04
Payer: MEDICARE

## 2019-03-04 ENCOUNTER — HOSPITAL ENCOUNTER (OUTPATIENT)
Age: 64
Discharge: HOME | End: 2019-03-04
Payer: MEDICARE

## 2019-03-04 VITALS
WEIGHT: 210 LBS | HEART RATE: 68 BPM | BODY MASS INDEX: 37.2 KG/M2 | SYSTOLIC BLOOD PRESSURE: 114 MMHG | DIASTOLIC BLOOD PRESSURE: 71 MMHG

## 2019-03-04 DIAGNOSIS — M79.7 FIBROMYALGIA: ICD-10-CM

## 2019-03-04 DIAGNOSIS — M17.0 PRIMARY OSTEOARTHRITIS OF BOTH KNEES: ICD-10-CM

## 2019-03-04 DIAGNOSIS — Z91.89 AT RISK FOR RESPIRATORY DEPRESSION DUE TO OPIOID: ICD-10-CM

## 2019-03-04 DIAGNOSIS — G89.4 CHRONIC PAIN SYNDROME: ICD-10-CM

## 2019-03-04 DIAGNOSIS — F33.41 RECURRENT MAJOR DEPRESSIVE DISORDER, IN PARTIAL REMISSION (HCC): ICD-10-CM

## 2019-03-04 DIAGNOSIS — F51.01 PRIMARY INSOMNIA: ICD-10-CM

## 2019-03-04 DIAGNOSIS — G44.229 CHRONIC TENSION-TYPE HEADACHE, NOT INTRACTABLE: ICD-10-CM

## 2019-03-04 DIAGNOSIS — M51.37 DEGENERATION OF LUMBAR OR LUMBOSACRAL INTERVERTEBRAL DISC: ICD-10-CM

## 2019-03-04 LAB
A/G RATIO: 1.2 (ref 1.1–2.2)
ALBUMIN SERPL-MCNC: 3.7 G/DL (ref 3.4–5)
ALP BLD-CCNC: 87 U/L (ref 40–129)
ALT SERPL-CCNC: 26 U/L (ref 10–40)
ANION GAP SERPL CALCULATED.3IONS-SCNC: 12 MMOL/L (ref 3–16)
AST SERPL-CCNC: 20 U/L (ref 15–37)
BILIRUB SERPL-MCNC: 0.3 MG/DL (ref 0–1)
BUN BLDV-MCNC: 20 MG/DL (ref 7–20)
CALCIUM SERPL-MCNC: 8.9 MG/DL (ref 8.3–10.6)
CHLORIDE BLD-SCNC: 104 MMOL/L (ref 99–110)
CO2: 29 MMOL/L (ref 21–32)
CREAT SERPL-MCNC: <0.5 MG/DL (ref 0.6–1.2)
GFR AFRICAN AMERICAN: >60
GFR NON-AFRICAN AMERICAN: >60
GLOBULIN: 3 G/DL
GLUCOSE BLD-MCNC: 125 MG/DL (ref 70–99)
HCT VFR BLD CALC: 40.6 % (ref 36–48)
HEMOGLOBIN: 13.5 G/DL (ref 12–16)
MCH RBC QN AUTO: 30.8 PG (ref 26–34)
MCHC RBC AUTO-ENTMCNC: 33.3 G/DL (ref 31–36)
MCV RBC AUTO: 92.3 FL (ref 80–100)
PDW BLD-RTO: 13.2 % (ref 12.4–15.4)
PLATELET # BLD: 258 K/UL (ref 135–450)
PMV BLD AUTO: 6.8 FL (ref 5–10.5)
POTASSIUM SERPL-SCNC: 4 MMOL/L (ref 3.5–5.1)
RBC # BLD: 4.39 M/UL (ref 4–5.2)
SODIUM BLD-SCNC: 145 MMOL/L (ref 136–145)
TOTAL PROTEIN: 6.7 G/DL (ref 6.4–8.2)
TSH SERPL DL<=0.05 MIU/L-ACNC: 2.8 UIU/ML (ref 0.27–4.2)
WBC # BLD: 4.6 K/UL (ref 4–11)

## 2019-03-04 PROCEDURE — 80053 COMPREHEN METABOLIC PANEL: CPT

## 2019-03-04 PROCEDURE — 85027 COMPLETE CBC AUTOMATED: CPT

## 2019-03-04 PROCEDURE — 36415 COLL VENOUS BLD VENIPUNCTURE: CPT

## 2019-03-04 PROCEDURE — 84443 ASSAY THYROID STIM HORMONE: CPT

## 2019-03-04 PROCEDURE — 99213 OFFICE O/P EST LOW 20 MIN: CPT | Performed by: INTERNAL MEDICINE

## 2019-03-04 PROCEDURE — 80061 LIPID PANEL: CPT

## 2019-03-04 RX ORDER — OXYCODONE HYDROCHLORIDE 5 MG/1
5 TABLET ORAL EVERY 6 HOURS PRN
Qty: 126 TABLET | Refills: 0 | Status: SHIPPED | OUTPATIENT
Start: 2019-03-04 | End: 2019-04-15 | Stop reason: SDUPTHER

## 2019-03-04 RX ORDER — AMITRIPTYLINE HYDROCHLORIDE 25 MG/1
TABLET, FILM COATED ORAL
Qty: 180 TABLET | Refills: 0 | Status: SHIPPED | OUTPATIENT
Start: 2019-03-04 | End: 2019-05-24 | Stop reason: SDUPTHER

## 2019-03-04 RX ORDER — MORPHINE SULFATE 30 MG/1
30 TABLET, FILM COATED, EXTENDED RELEASE ORAL DAILY
Qty: 42 TABLET | Refills: 0 | Status: SHIPPED | OUTPATIENT
Start: 2019-03-04 | End: 2019-04-15 | Stop reason: SDUPTHER

## 2019-03-04 RX ORDER — GABAPENTIN 600 MG/1
TABLET ORAL
Qty: 360 TABLET | Refills: 0 | Status: SHIPPED | OUTPATIENT
Start: 2019-03-04 | End: 2019-05-24 | Stop reason: SDUPTHER

## 2019-03-04 RX ORDER — TOPIRAMATE 25 MG/1
TABLET ORAL
Qty: 60 TABLET | Refills: 1 | Status: SHIPPED | OUTPATIENT
Start: 2019-03-04 | End: 2019-04-15 | Stop reason: SDUPTHER

## 2019-03-04 RX ORDER — DICLOFENAC SODIUM 75 MG/1
TABLET, DELAYED RELEASE ORAL
Qty: 180 TABLET | Refills: 0 | Status: SHIPPED | OUTPATIENT
Start: 2019-03-04 | End: 2019-05-24 | Stop reason: SDUPTHER

## 2019-03-05 LAB
BILIRUBIN DIRECT: <0.2 MG/DL (ref 0–0.3)
BILIRUBIN, INDIRECT: NORMAL MG/DL (ref 0–1)
CHOLESTEROL, TOTAL: 119 MG/DL (ref 0–199)
HDLC SERPL-MCNC: 43 MG/DL (ref 40–60)
LDL CHOLESTEROL CALCULATED: 58 MG/DL
TRIGL SERPL-MCNC: 90 MG/DL (ref 0–150)
VLDLC SERPL CALC-MCNC: 18 MG/DL

## 2019-04-15 ENCOUNTER — OFFICE VISIT (OUTPATIENT)
Dept: PAIN MANAGEMENT | Age: 64
End: 2019-04-15
Payer: MEDICARE

## 2019-04-15 VITALS
DIASTOLIC BLOOD PRESSURE: 69 MMHG | WEIGHT: 209 LBS | SYSTOLIC BLOOD PRESSURE: 108 MMHG | HEART RATE: 74 BPM | BODY MASS INDEX: 37.02 KG/M2

## 2019-04-15 DIAGNOSIS — G44.229 CHRONIC TENSION-TYPE HEADACHE, NOT INTRACTABLE: ICD-10-CM

## 2019-04-15 DIAGNOSIS — M79.7 FIBROMYALGIA: ICD-10-CM

## 2019-04-15 DIAGNOSIS — M17.0 PRIMARY OSTEOARTHRITIS OF BOTH KNEES: ICD-10-CM

## 2019-04-15 DIAGNOSIS — G89.4 CHRONIC PAIN SYNDROME: ICD-10-CM

## 2019-04-15 DIAGNOSIS — M51.37 DEGENERATION OF LUMBAR OR LUMBOSACRAL INTERVERTEBRAL DISC: ICD-10-CM

## 2019-04-15 PROCEDURE — 99213 OFFICE O/P EST LOW 20 MIN: CPT | Performed by: INTERNAL MEDICINE

## 2019-04-15 RX ORDER — MORPHINE SULFATE 30 MG/1
30 TABLET, FILM COATED, EXTENDED RELEASE ORAL DAILY
Qty: 42 TABLET | Refills: 0 | Status: SHIPPED | OUTPATIENT
Start: 2019-04-15 | End: 2019-05-24 | Stop reason: SDUPTHER

## 2019-04-15 RX ORDER — TOPIRAMATE 25 MG/1
TABLET ORAL
Qty: 60 TABLET | Refills: 1 | Status: SHIPPED | OUTPATIENT
Start: 2019-04-15 | End: 2019-05-24 | Stop reason: SDUPTHER

## 2019-04-15 RX ORDER — OXYCODONE HYDROCHLORIDE 5 MG/1
5 TABLET ORAL EVERY 6 HOURS PRN
Qty: 126 TABLET | Refills: 0 | Status: SHIPPED | OUTPATIENT
Start: 2019-04-15 | End: 2019-05-24 | Stop reason: SDUPTHER

## 2019-05-24 ENCOUNTER — OFFICE VISIT (OUTPATIENT)
Dept: PAIN MANAGEMENT | Age: 64
End: 2019-05-24
Payer: MEDICARE

## 2019-05-24 VITALS
BODY MASS INDEX: 37.2 KG/M2 | WEIGHT: 210 LBS | SYSTOLIC BLOOD PRESSURE: 120 MMHG | HEART RATE: 67 BPM | DIASTOLIC BLOOD PRESSURE: 72 MMHG

## 2019-05-24 DIAGNOSIS — M17.0 PRIMARY OSTEOARTHRITIS OF BOTH KNEES: ICD-10-CM

## 2019-05-24 DIAGNOSIS — G89.4 CHRONIC PAIN SYNDROME: ICD-10-CM

## 2019-05-24 DIAGNOSIS — M79.7 FIBROMYALGIA: ICD-10-CM

## 2019-05-24 DIAGNOSIS — M51.37 DEGENERATION OF LUMBAR OR LUMBOSACRAL INTERVERTEBRAL DISC: ICD-10-CM

## 2019-05-24 DIAGNOSIS — G44.229 CHRONIC TENSION-TYPE HEADACHE, NOT INTRACTABLE: ICD-10-CM

## 2019-05-24 PROCEDURE — 99213 OFFICE O/P EST LOW 20 MIN: CPT | Performed by: INTERNAL MEDICINE

## 2019-05-24 RX ORDER — OXYCODONE HYDROCHLORIDE 5 MG/1
5 TABLET ORAL EVERY 6 HOURS PRN
Qty: 126 TABLET | Refills: 0 | Status: SHIPPED | OUTPATIENT
Start: 2019-05-24 | End: 2019-06-28 | Stop reason: SDUPTHER

## 2019-05-24 RX ORDER — DICLOFENAC SODIUM 75 MG/1
TABLET, DELAYED RELEASE ORAL
Qty: 180 TABLET | Refills: 0 | Status: SHIPPED | OUTPATIENT
Start: 2019-05-24 | End: 2019-08-12 | Stop reason: SDUPTHER

## 2019-05-24 RX ORDER — GABAPENTIN 600 MG/1
TABLET ORAL
Qty: 360 TABLET | Refills: 0 | Status: SHIPPED | OUTPATIENT
Start: 2019-05-24 | End: 2019-08-12 | Stop reason: SDUPTHER

## 2019-05-24 RX ORDER — MORPHINE SULFATE 30 MG/1
30 TABLET, FILM COATED, EXTENDED RELEASE ORAL DAILY
Qty: 42 TABLET | Refills: 0 | Status: SHIPPED | OUTPATIENT
Start: 2019-05-24 | End: 2019-06-28 | Stop reason: SDUPTHER

## 2019-05-24 RX ORDER — AMITRIPTYLINE HYDROCHLORIDE 25 MG/1
TABLET, FILM COATED ORAL
Qty: 180 TABLET | Refills: 0 | Status: SHIPPED | OUTPATIENT
Start: 2019-05-24 | End: 2019-08-12 | Stop reason: SDUPTHER

## 2019-05-24 RX ORDER — TOPIRAMATE 25 MG/1
TABLET ORAL
Qty: 60 TABLET | Refills: 1 | Status: SHIPPED | OUTPATIENT
Start: 2019-05-24 | End: 2019-06-28 | Stop reason: SDUPTHER

## 2019-05-24 NOTE — PROGRESS NOTES
Nohemi Ceja  1955  [id number]      HISTORY OF PRESENT ILLNESS:  Ms. Sara Walsh is a 75 y.o. female returns for a follow up visit for pain management  She has a diagnosis of   1. Chronic pain syndrome    2. Fibromyalgia    3. Degeneration of lumbar or lumbosacral intervertebral disc    4. Primary osteoarthritis of both knees    5. Chronic tension-type headache, not intractable    6. Recurrent major depressive disorder, in partial remission ([facility] [address]    7. Primary insomnia    8. Gastroesophageal reflux disease without esophagitis    . She complains of pain in the both arm(s): elbow, both buttocks, both foot/feet: entire area, both hand(s): entire area, both knee(s), bilateral lower back and both shoulder(s): entire area She rates the pain 6/10 and describes it as aching. Current treatment regimen has helped relieve about 60% of the pain. She denies any side effects from the current pain regimen. Patient reports that since the last follow up visit the physical functioning is unchanged, family/social relationships are unchanged, mood is unchanged sleep patterns are unchanged, and that the overall functioning is unchanged. Patient denies misusing/abusing her narcotic pain medications or using any illegal drugs. There are No indicators for possible drug abuse, addiction or diversion problems. Patient states that she has been doing fair and is managing somewhat with the medications. She mentions that she has been managing her house chores. Patient denies any constipation symptoms or any cognitive side effects. Ms. Sara Walsh reports that her knees and back hurt the most and walking and standing is painful. She states that she can't do more than an hour of work. ALLERGIES: Patients list of allergies were reviewed     MEDICATIONS: Ms. Sara Walsh list of medications were reviewed. Her current medications are   Outpatient Medications Prior to Visit   Medication Sig Dispense Refill    topiramate (TOPAMAX) Naloxone HCl (EVZIO) 2 MG/0.4ML SOAJ Use as directed 1 Package 0    FLUoxetine (PROZAC) 40 MG capsule       ranitidine (ZANTAC) 300 MG tablet       Prenatal Vit-Fe Fumarate-FA (PNV PRENATAL PLUS MULTIVITAMIN) 27-1 MG TABS       Prenatal Vit-DSS-Fe Cbn-FA (VIRT-CHAN GT) 90-1 MG TABS       omeprazole (PRILOSEC) 40 MG capsule Take 40 mg by mouth daily.  KLOR-CON M20 20 MEQ tablet TAKE TWO TABLETS BY MOUTH TWICE A  tablet 3    VIVELLE-DOT 0.05 MG/24HR APPLY 1 PATCH TO SKIN TWICE WEEKLY 8 patch 11    furosemide (LASIX) 20 MG tablet Take 20 mg by mouth 3 times daily.  therapeutic multivitamin-minerals (THERAGRAN-M) tablet Take 1 tablet by mouth daily.  gabapentin (NEURONTIN) 600 MG tablet TAKE ONE TABLET BY MOUTH EVERY MORNING, 1 TABLET IN THE AFTERNOON, AND TAKE TWO TABLETS BY MOUTH EVERY EVENING 360 tablet 0     No current facility-administered medications for this visit. I will continue her current medication regimen  which is part of the above treatment schedule. It has been helping with Ms. Harris's chronic  medical problems which for this visit include:   Diagnoses of Chronic pain syndrome, Fibromyalgia, Degeneration of lumbar or lumbosacral intervertebral disc, Primary osteoarthritis of both knees, Chronic tension-type headache, not intractable, Recurrent major depressive disorder, in partial remission ([address] Primary insomnia, and Gastroesophageal reflux disease without esophagitis were pertinent to this visit. Risks and benefits of the medications and other alternative treatments  including no treatment were discussed with the patient. The common side effects of these medications were also explained to the patient. Informed verbal consent was obtained.    Goals of current treatment regimen include improvement in pain, restoration of functioning- with focus on improvement in physical performance, general activity, work or disability,emotional distress, health care utilization and  decreased medication consumption. Will continue to monitor progress towards achieving/maintaining therapeutic goals with special emphasis on  1. Improvement in perceived interfernce  of pain with ADL's. Ability to do home exercises independently. Ability to do household chores indoor and/or outdoor work and social and leisure activities. Improve psychosocial and physical functioning. - she is showing progression towards this treatment goal with the current regimen. She was advised against drinking alcohol with the narcotic pain medicines, advised against driving or handling machinery while adjusting the dose of medicines or if having cognitive  issues related to the current medications. Risk of overdose and death, if medicines not taken as prescribed, were also discussed. If the patient develops new symptoms or if the symptoms worsen, the patient should call the office. While transcribing every attempt was made to maintain the accuracy of the note in terms of it's contents,there may have been some errors made inadvertently. Thank you for allowing me to participate in the care of this patient.     Harman Thompson MD.    Cc: Kim Knowles MD

## 2019-06-28 ENCOUNTER — OFFICE VISIT (OUTPATIENT)
Dept: PAIN MANAGEMENT | Age: 64
End: 2019-06-28
Payer: MEDICARE

## 2019-06-28 VITALS
HEART RATE: 68 BPM | WEIGHT: 210 LBS | DIASTOLIC BLOOD PRESSURE: 66 MMHG | BODY MASS INDEX: 37.2 KG/M2 | SYSTOLIC BLOOD PRESSURE: 102 MMHG

## 2019-06-28 DIAGNOSIS — M51.37 DEGENERATION OF LUMBAR OR LUMBOSACRAL INTERVERTEBRAL DISC: ICD-10-CM

## 2019-06-28 DIAGNOSIS — G89.4 CHRONIC PAIN SYNDROME: ICD-10-CM

## 2019-06-28 DIAGNOSIS — M79.7 FIBROMYALGIA: ICD-10-CM

## 2019-06-28 DIAGNOSIS — G44.229 CHRONIC TENSION-TYPE HEADACHE, NOT INTRACTABLE: ICD-10-CM

## 2019-06-28 DIAGNOSIS — M17.0 PRIMARY OSTEOARTHRITIS OF BOTH KNEES: ICD-10-CM

## 2019-06-28 PROCEDURE — 99213 OFFICE O/P EST LOW 20 MIN: CPT | Performed by: INTERNAL MEDICINE

## 2019-06-28 RX ORDER — TOPIRAMATE 25 MG/1
TABLET ORAL
Qty: 60 TABLET | Refills: 1 | Status: SHIPPED | OUTPATIENT
Start: 2019-06-28 | End: 2019-08-12 | Stop reason: SDUPTHER

## 2019-06-28 RX ORDER — MORPHINE SULFATE 30 MG/1
30 TABLET, FILM COATED, EXTENDED RELEASE ORAL DAILY
Qty: 42 TABLET | Refills: 0 | Status: SHIPPED | OUTPATIENT
Start: 2019-06-28 | End: 2019-08-12 | Stop reason: SDUPTHER

## 2019-06-28 RX ORDER — OXYCODONE HYDROCHLORIDE 5 MG/1
5 TABLET ORAL EVERY 6 HOURS PRN
Qty: 126 TABLET | Refills: 0 | Status: SHIPPED | OUTPATIENT
Start: 2019-06-28 | End: 2019-08-12 | Stop reason: SDUPTHER

## 2019-06-28 NOTE — PROGRESS NOTES
Juliet Party  1955  [id number]    HISTORY OF PRESENT ILLNESS:  Ms. Wes Foley is a 75 y.o. female returns for a follow up visit for multiple medical problems. Her current presenting problems are   1. Chronic pain syndrome    2. Fibromyalgia    3. Degeneration of lumbar or lumbosacral intervertebral disc    4. Primary osteoarthritis of both knees    5. Chronic tension-type headache, not intractable    6. Recurrent major depressive disorder, in partial remission ([facility] [address]    7. Primary insomnia    8. Gastroesophageal reflux disease without esophagitis    . As per information/history obtained from the PADT(patient assessment and documentation tool) - She complains of pain in the neck and lower back with radiation to the shoulders Bilateral, hands Bilateral, buttocks, hips Bilateral, ankles Bilateral and feet Bilateral She rates the pain 6/10 and describes it as sharp, aching. Pain is made worse by: movement, walking, standing, sitting, bending, lifting. Current treatment regimen has helped relieve about 60% of the pain. She denies side effects from the current pain regimen. Patient reports that since the last follow up visit the physical functioning is unchanged, family/social relationships are unchanged, mood is unchanged and sleep patterns are unchanged, and that the overall functioning is unchanged. Patient denies neurological bowel or bladder. Patient denies misusing/abusing her narcotic pain medications or using any illegal drugs. There are No indicators for possible drug abuse, addiction or diversion problems. Upon obtaining the medical history from Ms. Wes Foley regarding today's office visit for her presenting problems, Patient states she has been doing fair and managing with her regimen. she states that the medications are helping with the headaches  and they are tolerable. Denies nausea, vomiting, sonophobia, photophobia, photopsia, diplopia, vertigo, neck stiffness.  She says she is using medical history was reviewed. REVIEW OF SYSTEMS:    Respiratory: Negative for apnea, chest tightness and shortness of breath or change in baseline breathing. Gastrointestinal: Negative for nausea, vomiting, abdominal pain, diarrhea, constipation, blood in stool and abdominal distention. PHYSICAL EXAM:   Nursing note and vitals reviewed. /66   Pulse 68   Wt 210 lb (95.3 kg)   BMI 37.20 kg/m²   Constitutional: She appears well-developed and well-nourished. No acute distress. Skin: Skin is warm and dry, good turgor. No rash noted. She is not diaphoretic. Cardiovascular: Normal rate, regular rhythm, normal heart sounds, and does not have murmur. Pulmonary/Chest: Effort normal. No respiratory distress. She does not have wheezes in the lung fields. She has no rales. Neurological/Psychiatric:She is alert and oriented to person, place, and time. Coordination is  normal.  Her mood isAppropriate and affect is Neutral/Euthymic(normal) . Other: + limp     IMPRESSION:   1. Chronic pain syndrome    2. Fibromyalgia    3. Degeneration of lumbar or lumbosacral intervertebral disc    4. Primary osteoarthritis of both knees    5. Chronic tension-type headache, not intractable        PLAN:  Informed verbal consent was obtained  -[place] was obtained and reviewed  for the last one year and no indicators of drug misuse  were found. Any other controlled substance prescriptions  seen on the record have been accounted for, I am aware of the patient receiving these medications. Sara January [place] record will be rechecked as part of office protocol.    -Continue with current opioid regimen   -Adv Biofeedback, relaxation and meditation techniques.  Referral to psychologist for CBT was also discussed with patient   -She was advised weight reduction, diet changes- 800-1200 idalia diet, diet diary, exercising, nutritional  consult increased physical activity as tolerated   -she was advised  to avoid using too many OTC analgesics to control the headaches, avoid chocolates, increased caffeine, cheeses and MSG nitrite containing foods, cigarette smoking. To avoid bright lights, strong smells and skipping meals. -Walking/Stretching exercises as advised    Current Outpatient Medications   Medication Sig Dispense Refill    topiramate (TOPAMAX) 25 MG tablet TAKE ONE TABLET BY MOUTH TWICE A DAY 60 tablet 1    morphine (MS CONTIN) 30 MG extended release tablet Take 1 tablet by mouth daily for 42 days. 42 tablet 0    oxyCODONE (ROXICODONE) 5 MG immediate release tablet Take 1 tablet by mouth every 6 hours as needed for Pain (max 3 per day) for up to 42 days. 126 tablet 0    amitriptyline (ELAVIL) 25 MG tablet TAKE ONE TO TWO TABLETS BY MOUTH ONCE NIGHTLY 180 tablet 0    diclofenac (VOLTAREN) 75 MG EC tablet TAKE ONE TABLET BY MOUTH TWICE A DAY FOR TWO WEEKS, THEN AS NEEDED 180 tablet 0    Naloxone HCl (EVZIO) 2 MG/0.4ML SOAJ Use as directed 1 Package 0    FLUoxetine (PROZAC) 40 MG capsule       ranitidine (ZANTAC) 300 MG tablet       Prenatal Vit-Fe Fumarate-FA (PNV PRENATAL PLUS MULTIVITAMIN) 27-1 MG TABS       Prenatal Vit-DSS-Fe Cbn-FA (VIRT-CHAN GT) 90-1 MG TABS       omeprazole (PRILOSEC) 40 MG capsule Take 40 mg by mouth daily.  KLOR-CON M20 20 MEQ tablet TAKE TWO TABLETS BY MOUTH TWICE A  tablet 3    VIVELLE-DOT 0.05 MG/24HR APPLY 1 PATCH TO SKIN TWICE WEEKLY 8 patch 11    furosemide (LASIX) 20 MG tablet Take 20 mg by mouth 3 times daily.  therapeutic multivitamin-minerals (THERAGRAN-M) tablet Take 1 tablet by mouth daily.  gabapentin (NEURONTIN) 600 MG tablet TAKE ONE TABLET BY MOUTH EVERY MORNING, 1 TABLET IN THE AFTERNOON, AND TAKE TWO TABLETS BY MOUTH EVERY EVENING 360 tablet 0     No current facility-administered medications for this visit. I will continue her current medication regimen  which is part of the above treatment schedule. It has been helping with Ms. Harris's chronic you for allowing me to participate in the care of this patient.     Jenny Jo MD.    Cc: Angel Jaquez MD    I, Kyle Lock, am scribing for and in the presence of Dr. Jenny Jo.   06/28/19  1:51 PM  Kyle Lock MA     I, Dr. Jenny Jo, personally performed the services described in this documentation as scribed by   Kyle Lock MA in my presence and it is both accurate and complete

## 2019-08-12 ENCOUNTER — OFFICE VISIT (OUTPATIENT)
Dept: PAIN MANAGEMENT | Age: 64
End: 2019-08-12
Payer: MEDICARE

## 2019-08-12 VITALS
DIASTOLIC BLOOD PRESSURE: 68 MMHG | SYSTOLIC BLOOD PRESSURE: 105 MMHG | WEIGHT: 210 LBS | BODY MASS INDEX: 37.2 KG/M2 | HEART RATE: 72 BPM

## 2019-08-12 DIAGNOSIS — M79.7 FIBROMYALGIA: ICD-10-CM

## 2019-08-12 DIAGNOSIS — G89.4 CHRONIC PAIN SYNDROME: ICD-10-CM

## 2019-08-12 DIAGNOSIS — M51.37 DEGENERATION OF LUMBAR OR LUMBOSACRAL INTERVERTEBRAL DISC: ICD-10-CM

## 2019-08-12 DIAGNOSIS — K43.2 RECURRENT INCISIONAL HERNIA WITH COMPLICATION: ICD-10-CM

## 2019-08-12 DIAGNOSIS — M19.90 ARTHRITIS: ICD-10-CM

## 2019-08-12 DIAGNOSIS — M17.0 PRIMARY OSTEOARTHRITIS OF BOTH KNEES: ICD-10-CM

## 2019-08-12 PROCEDURE — 99213 OFFICE O/P EST LOW 20 MIN: CPT | Performed by: INTERNAL MEDICINE

## 2019-08-12 RX ORDER — AMITRIPTYLINE HYDROCHLORIDE 25 MG/1
TABLET, FILM COATED ORAL
Qty: 180 TABLET | Refills: 0 | Status: SHIPPED | OUTPATIENT
Start: 2019-08-12 | End: 2019-11-07 | Stop reason: SDUPTHER

## 2019-08-12 RX ORDER — GABAPENTIN 600 MG/1
TABLET ORAL
Qty: 360 TABLET | Refills: 0 | Status: SHIPPED | OUTPATIENT
Start: 2019-08-12 | End: 2019-11-07 | Stop reason: SDUPTHER

## 2019-08-12 RX ORDER — DICLOFENAC SODIUM 75 MG/1
TABLET, DELAYED RELEASE ORAL
Qty: 180 TABLET | Refills: 0 | Status: SHIPPED | OUTPATIENT
Start: 2019-08-12 | End: 2019-11-07 | Stop reason: SDUPTHER

## 2019-08-12 RX ORDER — TOPIRAMATE 25 MG/1
TABLET ORAL
Qty: 60 TABLET | Refills: 1 | Status: SHIPPED | OUTPATIENT
Start: 2019-08-12 | End: 2019-11-07 | Stop reason: SDUPTHER

## 2019-08-12 RX ORDER — MORPHINE SULFATE 30 MG/1
30 TABLET, FILM COATED, EXTENDED RELEASE ORAL DAILY
Qty: 42 TABLET | Refills: 0 | Status: SHIPPED | OUTPATIENT
Start: 2019-08-12 | End: 2019-09-19 | Stop reason: SDUPTHER

## 2019-08-12 RX ORDER — OXYCODONE HYDROCHLORIDE 5 MG/1
5 TABLET ORAL EVERY 6 HOURS PRN
Qty: 126 TABLET | Refills: 0 | Status: SHIPPED | OUTPATIENT
Start: 2019-08-12 | End: 2019-09-19 | Stop reason: SDUPTHER

## 2019-08-12 NOTE — PROGRESS NOTES
[place]  1955  [id number]      HISTORY OF PRESENT ILLNESS:  Ms. Florinda Reaves is a 75 y.o. female returns for a follow up visit for pain management  She has a diagnosis of   1. Chronic pain syndrome    2. Fibromyalgia    3. Degeneration of lumbar or lumbosacral intervertebral disc    4. Primary osteoarthritis of both knees    5. Chronic tension-type headache, not intractable    6. Chronic nonintractable headache, unspecified headache type    7. Gastroesophageal reflux disease without esophagitis    8. Primary insomnia    9. Recurrent major depressive disorder, in partial remission ([facility] [address]    10. Arthritis    11. Recurrent incisional hernia with complication    . She complains of pain in the neck, bilateral shoulders, bilateral elbows, bilateral knees, bilateral hands, bilateral ankles/feet  She rates the pain 6/10 and describes it as aching, burning, throbbing. Current treatment regimen has helped relieve about 50% of the pain. She denies any side effects from the current pain regimen. Patient reports that since the last follow up visit the physical functioning is unchanged, family/social relationships are unchanged, mood is unchanged sleep patterns are unchanged, and that the overall functioning is unchanged. Patient denies misusing/abusing her narcotic pain medications or using any illegal drugs. There are No indicators for possible drug abuse, addiction or diversion problems. Patient reports that the pain is fairly well tolerated with the current regimen has had some exacerbations, but overall has been tolerable. Ms. Florinda Reaves states that  she has been staying with her exercise routine and working indoors and/or outdoors as tolerated, performing household chores. She says she has been managing with her regimen. Patient reports her back hurts worse than her legs. she states that the medications are helping with the headaches  and they are tolerable.   Denies nausea, vomiting, sonophobia, MG tablet       Prenatal Vit-Fe Fumarate-FA (PNV PRENATAL PLUS MULTIVITAMIN) 27-1 MG TABS       Prenatal Vit-DSS-Fe Cbn-FA (VIRT-CHAN GT) 90-1 MG TABS       omeprazole (PRILOSEC) 40 MG capsule Take 40 mg by mouth daily.  KLOR-CON M20 20 MEQ tablet TAKE TWO TABLETS BY MOUTH TWICE A  tablet 3    VIVELLE-DOT 0.05 MG/24HR APPLY 1 PATCH TO SKIN TWICE WEEKLY 8 patch 11    furosemide (LASIX) 20 MG tablet Take 20 mg by mouth 3 times daily.  therapeutic multivitamin-minerals (THERAGRAN-M) tablet Take 1 tablet by mouth daily.  gabapentin (NEURONTIN) 600 MG tablet TAKE ONE TABLET BY MOUTH EVERY MORNING, 1 TABLET IN THE AFTERNOON, AND TAKE TWO TABLETS BY MOUTH EVERY EVENING 360 tablet 0     No current facility-administered medications for this visit. I will continue her current medication regimen  which is part of the above treatment schedule. It has been helping with Ms. Harris's chronic  medical problems which for this visit include:   Diagnoses of Chronic pain syndrome, Fibromyalgia, Degeneration of lumbar or lumbosacral intervertebral disc, Primary osteoarthritis of both knees, Chronic tension-type headache, not intractable, Chronic nonintractable headache, unspecified headache type, Gastroesophageal reflux disease without esophagitis, Primary insomnia, Recurrent major depressive disorder, in partial remission ([address] Arthritis, and Recurrent incisional hernia with complication were pertinent to this visit. Risks and benefits of the medications and other alternative treatments  including no treatment were discussed with the patient. The common side effects of these medications were also explained to the patient. Informed verbal consent was obtained.    Goals of current treatment regimen include improvement in pain, restoration of functioning- with focus on improvement in physical performance, general activity, work or disability,emotional distress, health care utilization and  decreased

## 2019-08-22 NOTE — PROGRESS NOTES
continue current management of bilateral shoulder dislocation using arm sling until follow-up with Orthopedics.  The Shoulder Joint    The shoulder is made up of bones, muscles, ligaments, and tendons. They work together so you can reach, swing, and lift in comfort. Learning about the parts of the shoulder joint will help you to understand your shoulder problem.  The parts of the joint  The shoulder joint is where the humerus (upper arm bone) meets the scapula (shoulder blade):  · Muscles and ligaments help make up the joint. They attach to the shoulder blade and upper arm bone.  · At the top of the shoulder blade are two bony knobs called the acromion and coracoid process.  · The subacromial space is between the top of the humerus and the acromion. This space is filled with tendons, muscles, and the subacromial bursa.  · The bursa is a sac of fluid that cushions shoulder parts as they move.  The supraspinatus muscle and tendon are located in the subacromial space. They help form the rotator cuff and are commonly injured in a rotator cuff tear.  Date Last Reviewed: 10/12/2015  © 5282-7333 [employer]. [address] [place] [address]. All rights reserved. This information is not intended as a substitute for professional medical care. Always follow your healthcare professional's instructions.         Charlene Barr  1955  [id number]    HISTORY OF PRESENT ILLNESS:  Ms. Salamanca is a 75 y.o. female returns for a follow up visit for multiple medical problems. Her current presenting problems are   1. Chronic pain syndrome    2. Fibromyalgia    3. Chronic tension-type headache, not intractable    4. Degeneration of lumbar or lumbosacral intervertebral disc    5. Primary osteoarthritis of both knees    6. Recurrent major depressive disorder, in partial remission ([address]    7. Primary insomnia    8. Gastroesophageal reflux disease without esophagitis    . As per information/history obtained from the PADT(patient assessment and documentation tool) - She complains of pain in the shoulders Bilateral and mid back with radiation to the elbows Bilateral, hands Bilateral, lower back, upper leg Bilateral, knees Bilateral, lower leg Bilateral and feet Bilateral She rates the pain 6/10 and describes it as sharp, aching. Pain is made worse by: standing. Current treatment regimen has helped relieve about 60% of the pain. She denies side effects from the current pain regimen. Patient reports that since the last follow up visit the physical functioning is unchanged, family/social relationships are unchanged, mood is unchanged and sleep patterns are unchanged, and that the overall functioning is unchanged. Patient denies neurological bowel or bladder. Patient denies misusing/abusing her narcotic pain medications or using any illegal drugs. There are No indicators for possible drug abuse, addiction or diversion problems. Upon obtaining the medical history from Ms. Salamanca regarding today's office visit for her presenting problems, patient states that she is doing fair. Ms. Salamanca reports that she has good and bad days and she is managing OK with the medications. Patient had labs done recently. She mentions that she is suing [employer] with Oxycodone for BTP.   Patient denies any constipation symptoms and states that she is managing her ADLs. ALLERGIES: Patients list of allergies were reviewed     MEDICATIONS: Ms. Vladimir Grier list of medications were reviewed. Her current medications are   Outpatient Medications Prior to Visit   Medication Sig Dispense Refill    topiramate (TOPAMAX) 25 MG tablet TAKE ONE TABLET BY MOUTH TWICE A DAY 60 tablet 1    morphine (MS CONTIN) 30 MG extended release tablet Take 1 tablet by mouth daily for 42 days. 42 tablet 0    oxyCODONE (ROXICODONE) 5 MG immediate release tablet Take 1 tablet by mouth every 6 hours as needed for Pain (max 3 per day) for up to 42 days. 126 tablet 0    amitriptyline (ELAVIL) 25 MG tablet TAKE ONE TO TWO TABLETS BY MOUTH ONCE NIGHTLY 180 tablet 0    diclofenac (VOLTAREN) 75 MG EC tablet TAKE ONE TABLET BY MOUTH TWICE A DAY FOR TWO WEEKS, THEN AS NEEDED 180 tablet 0    Naloxone HCl (EVZIO) 2 MG/0.4ML SOAJ Use as directed 1 Package 0    FLUoxetine (PROZAC) 40 MG capsule       ranitidine (ZANTAC) 300 MG tablet       Prenatal Vit-Fe Fumarate-FA (PNV PRENATAL PLUS MULTIVITAMIN) 27-1 MG TABS       Prenatal Vit-DSS-Fe Cbn-FA (VIRT-CHAN GT) 90-1 MG TABS       omeprazole (PRILOSEC) 40 MG capsule Take 40 mg by mouth daily.  KLOR-CON M20 20 MEQ tablet TAKE TWO TABLETS BY MOUTH TWICE A  tablet 3    VIVELLE-DOT 0.05 MG/24HR APPLY 1 PATCH TO SKIN TWICE WEEKLY 8 patch 11    furosemide (LASIX) 20 MG tablet Take 20 mg by mouth 3 times daily.  therapeutic multivitamin-minerals (THERAGRAN-M) tablet Take 1 tablet by mouth daily.  gabapentin (NEURONTIN) 600 MG tablet TAKE ONE TABLET BY MOUTH EVERY MORNING, 1 TABLET IN THE AFTERNOON, AND TAKE TWO TABLETS BY MOUTH EVERY EVENING 360 tablet 0     No facility-administered medications prior to visit. SOCIAL/FAMILY/PAST MEDICAL HISTORY: Ms. Caleb Rodriguez, family and past medical history was reviewed.      REVIEW OF SYSTEMS:    Respiratory: Negative for apnea, chest tightness and shortness of breath or change in baseline breathing. Gastrointestinal: Negative for nausea, vomiting, abdominal pain, diarrhea, constipation, blood in stool and abdominal distention. PHYSICAL EXAM:   Nursing note and vitals reviewed. /69   Pulse 74   Wt 209 lb (94.8 kg)   BMI 37.02 kg/m²   Constitutional: She appears well-developed and well-nourished. No acute distress. Skin: Skin is warm and dry, good turgor. No rash noted. She is not diaphoretic. Cardiovascular: Normal rate, regular rhythm, normal heart sounds, and does not have murmur. Pulmonary/Chest: Effort normal. No respiratory distress. She does not have wheezes in the lung fields. She has no rales. Neurological/Psychiatric:She is alert and oriented to person, place, and time. Coordination is  normal.  Her mood isAppropriate and affect is Flat/blunted and Anxious . IMPRESSION:   1. Chronic pain syndrome    2. Fibromyalgia    3. Degeneration of lumbar or lumbosacral intervertebral disc    4. Primary osteoarthritis of both knees        PLAN:  Informed verbal consent was obtained  continue with current opioid regimen  -Adv Biofeedback, relaxation and meditation techniques. Referral to psychologist for CBT was also discussed with patient  -She was advised weight reduction, diet changes- 800-1200 idalia diet, diet diary, exercising, nutritional  consult increased physical activity as tolerated  -Labs reviewed, increased glucose will F/U with PCP. -Walking as tolerated 20 minutes every day. Current Outpatient Medications   Medication Sig Dispense Refill    topiramate (TOPAMAX) 25 MG tablet TAKE ONE TABLET BY MOUTH TWICE A DAY 60 tablet 1    morphine (MS CONTIN) 30 MG extended release tablet Take 1 tablet by mouth daily for 42 days. 42 tablet 0    oxyCODONE (ROXICODONE) 5 MG immediate release tablet Take 1 tablet by mouth every 6 hours as needed for Pain (max 3 per day) for up to 42 days.  126 tablet 0    amitriptyline (ELAVIL) 25 MG tablet TAKE ONE TO TWO TABLETS BY MOUTH ONCE NIGHTLY 180 tablet 0    diclofenac (VOLTAREN) 75 MG EC tablet TAKE ONE TABLET BY MOUTH TWICE A DAY FOR TWO WEEKS, THEN AS NEEDED 180 tablet 0    Naloxone HCl (EVZIO) 2 MG/0.4ML SOAJ Use as directed 1 Package 0    FLUoxetine (PROZAC) 40 MG capsule       ranitidine (ZANTAC) 300 MG tablet       Prenatal Vit-Fe Fumarate-FA (PNV PRENATAL PLUS MULTIVITAMIN) 27-1 MG TABS       Prenatal Vit-DSS-Fe Cbn-FA (VIRT-CHAN GT) 90-1 MG TABS       omeprazole (PRILOSEC) 40 MG capsule Take 40 mg by mouth daily.  KLOR-CON M20 20 MEQ tablet TAKE TWO TABLETS BY MOUTH TWICE A  tablet 3    VIVELLE-DOT 0.05 MG/24HR APPLY 1 PATCH TO SKIN TWICE WEEKLY 8 patch 11    furosemide (LASIX) 20 MG tablet Take 20 mg by mouth 3 times daily.  therapeutic multivitamin-minerals (THERAGRAN-M) tablet Take 1 tablet by mouth daily.  gabapentin (NEURONTIN) 600 MG tablet TAKE ONE TABLET BY MOUTH EVERY MORNING, 1 TABLET IN THE AFTERNOON, AND TAKE TWO TABLETS BY MOUTH EVERY EVENING 360 tablet 0     No current facility-administered medications for this visit. I will continue her current medication regimen  which is part of the above treatment schedule. It has been helping with Ms. Harris's chronic  medical problems which for this visit include:   Diagnoses of Chronic pain syndrome, Fibromyalgia, Chronic tension-type headache, not intractable, Degeneration of lumbar or lumbosacral intervertebral disc, Primary osteoarthritis of both knees, Recurrent major depressive disorder, in partial remission ([address] Primary insomnia, and Gastroesophageal reflux disease without esophagitis were pertinent to this visit. Risks and benefits of the medications and other alternative treatments  including no treatment were discussed with the patient. The common side effects of these medications were also explained to the patient. Informed verbal consent was obtained.    Goals of current treatment regimen include improvement in pain, restoration of functioning- with focus on improvement in physical performance, general activity, work or disability,emotional distress, health care utilization and  decreased medication consumption. Will continue to monitor progress towards achieving/maintaining therapeutic goals with special emphasis on  1. Improvement in perceived interfernce  of pain with ADL's. Ability to do home exercises independently. Ability to do household chores indoor and/or outdoor work and social and leisure activities. Improve psychosocial and physical functioning. - she is showing progression towards this treatment goal with the current regimen. She was advised against drinking alcohol with the narcotic pain medicines, advised against driving or handling machinery while adjusting the dose of medicines or if having cognitive  issues related to the current medications. Risk of overdose and death, if medicines not taken as prescribed, were also discussed. If the patient develops new symptoms or if the symptoms worsen, the patient should call the office. While transcribing every attempt was made to maintain the accuracy of the note in terms of it's contents,there may have been some errors made inadvertently. Thank you for allowing me to participate in the care of this patient.     Jourdan Wiley MD.    Cc: Shea Richmond MD    I, Jyoti Esteves am scribing for and in the presence of Dr. Jourdan Wiley.   04/15/19  1:18 PM  Jyoti Esteves MA   I, Dr. Jourdan Wiley, personally performed the services described in this documentation as scribed by    Jyoti Esteves MA in my presence and it is both accurate and complete

## 2019-09-19 ENCOUNTER — OFFICE VISIT (OUTPATIENT)
Dept: PAIN MANAGEMENT | Age: 64
End: 2019-09-19
Payer: MEDICARE

## 2019-09-19 VITALS
SYSTOLIC BLOOD PRESSURE: 117 MMHG | BODY MASS INDEX: 37.2 KG/M2 | DIASTOLIC BLOOD PRESSURE: 62 MMHG | WEIGHT: 210 LBS | HEART RATE: 77 BPM

## 2019-09-19 DIAGNOSIS — M17.0 PRIMARY OSTEOARTHRITIS OF BOTH KNEES: ICD-10-CM

## 2019-09-19 DIAGNOSIS — M79.7 FIBROMYALGIA: ICD-10-CM

## 2019-09-19 DIAGNOSIS — M51.37 DEGENERATION OF LUMBAR OR LUMBOSACRAL INTERVERTEBRAL DISC: ICD-10-CM

## 2019-09-19 DIAGNOSIS — G89.4 CHRONIC PAIN SYNDROME: ICD-10-CM

## 2019-09-19 PROCEDURE — 99213 OFFICE O/P EST LOW 20 MIN: CPT | Performed by: INTERNAL MEDICINE

## 2019-09-19 RX ORDER — MORPHINE SULFATE 30 MG/1
30 TABLET, FILM COATED, EXTENDED RELEASE ORAL DAILY
Qty: 42 TABLET | Refills: 0 | Status: SHIPPED | OUTPATIENT
Start: 2019-09-19 | End: 2019-11-07 | Stop reason: SDUPTHER

## 2019-09-19 RX ORDER — OXYCODONE HYDROCHLORIDE 5 MG/1
5 TABLET ORAL EVERY 6 HOURS PRN
Qty: 126 TABLET | Refills: 0 | Status: SHIPPED | OUTPATIENT
Start: 2019-09-19 | End: 2019-11-07 | Stop reason: SDUPTHER

## 2019-09-19 NOTE — PROGRESS NOTES
Paige Iglesias  1955  [id number]    HISTORY OF PRESENT ILLNESS:  Ms. Stephanie Go is a 75 y.o. female returns for a follow up visit for multiple medical problems. Her current presenting problems are   1. Chronic pain syndrome    2. Fibromyalgia    3. Degeneration of lumbar or lumbosacral intervertebral disc    4. Primary osteoarthritis of both knees    5. Chronic tension-type headache, not intractable    6. Recurrent major depressive disorder, in partial remission ([address]    7. Primary insomnia    8. Gastroesophageal reflux disease without esophagitis    . As per information/history obtained from the PADT(patient assessment and documentation tool) - She complains of pain in the shoulders Bilateral, elbows Bilateral, lower back and knees Bilateral with radiation to the hands Bilateral, buttocks, hips Bilateral, ankles Bilateral and feet Bilateral She rates the pain 6/10 and describes it as aching, burning, throbbing. Pain is made worse by: movement, walking, standing, sitting, bending. Current treatment regimen has helped relieve about 60% of the pain. She denies side effects from the current pain regimen. Patient reports that since the last follow up visit the physical functioning is unchanged, family/social relationships are unchanged, mood is unchanged and sleep patterns are unchanged, and that the overall functioning is unchanged. Patient denies neurological bowel or bladder. Patient denies misusing/abusing her narcotic pain medications or using any illegal drugs. There are No indicators for possible drug abuse, addiction or diversion problems. Upon obtaining the medical history from Ms. Stephanie Go regarding today's office visit for her presenting problems, Ms. Stephanie Go states she is doing fair, pain has been baseline, tolerable with her regimen. She mentions she is using Voltaren still daily, Patient denies any GERD symptoms. She mentions she is still on Ms Contin along with Oxycodone 3 per day.  She reports she is going on vacation. ALLERGIES: Patients list of allergies were reviewed     MEDICATIONS: Ms. Flo Ley list of medications were reviewed. Her current medications are   Outpatient Medications Prior to Visit   Medication Sig Dispense Refill    topiramate (TOPAMAX) 25 MG tablet TAKE ONE TABLET BY MOUTH TWICE A DAY 60 tablet 1    amitriptyline (ELAVIL) 25 MG tablet TAKE ONE TO TWO TABLETS BY MOUTH ONCE NIGHTLY 180 tablet 0    diclofenac (VOLTAREN) 75 MG EC tablet TAKE ONE TABLET BY MOUTH TWICE A DAY FOR TWO WEEKS, THEN AS NEEDED 180 tablet 0    morphine (MS CONTIN) 30 MG extended release tablet Take 1 tablet by mouth daily for 42 days. 42 tablet 0    oxyCODONE (ROXICODONE) 5 MG immediate release tablet Take 1 tablet by mouth every 6 hours as needed for Pain (max 3 per day) for up to 42 days. 126 tablet 0    Naloxone HCl (EVZIO) 2 MG/0.4ML SOAJ Use as directed 1 Package 0    FLUoxetine (PROZAC) 40 MG capsule       ranitidine (ZANTAC) 300 MG tablet       Prenatal Vit-Fe Fumarate-FA (PNV PRENATAL PLUS MULTIVITAMIN) 27-1 MG TABS       Prenatal Vit-DSS-Fe Cbn-FA (VIRT-CHAN GT) 90-1 MG TABS       omeprazole (PRILOSEC) 40 MG capsule Take 40 mg by mouth daily.  KLOR-CON M20 20 MEQ tablet TAKE TWO TABLETS BY MOUTH TWICE A  tablet 3    VIVELLE-DOT 0.05 MG/24HR APPLY 1 PATCH TO SKIN TWICE WEEKLY 8 patch 11    furosemide (LASIX) 20 MG tablet Take 20 mg by mouth 3 times daily.  therapeutic multivitamin-minerals (THERAGRAN-M) tablet Take 1 tablet by mouth daily.  gabapentin (NEURONTIN) 600 MG tablet TAKE ONE TABLET BY MOUTH EVERY MORNING, 1 TABLET IN THE AFTERNOON, AND TAKE TWO TABLETS BY MOUTH EVERY EVENING 360 tablet 0     No facility-administered medications prior to visit. SOCIAL/FAMILY/PAST MEDICAL HISTORY: Ms. Trina Ponce, family and past medical history was reviewed.      REVIEW OF SYSTEMS:    Respiratory: Negative for apnea, chest tightness and shortness of breath or change in baseline breathing. Gastrointestinal: Negative for nausea, vomiting, abdominal pain, diarrhea, constipation, blood in stool and abdominal distention. PHYSICAL EXAM:   Nursing note and vitals reviewed. /62   Pulse 77   Wt 210 lb (95.3 kg)   BMI 37.20 kg/m²   Constitutional: She appears well-developed and well-nourished. No acute distress. Skin: Skin is warm and dry, good turgor. No rash noted. She is not diaphoretic. Cardiovascular: Normal rate, regular rhythm, normal heart sounds, and does not have murmur. Pulmonary/Chest: Effort normal. No respiratory distress. She does not have wheezes in the lung fields. She has no rales. Neurological/Psychiatric:She is alert and oriented to person, place, and time. Coordination is  normal.  Her mood isAppropriate and affect is Neutral/Euthymic(normal) . Other: varicose veins  IMPRESSION:   1. Chronic pain syndrome    2. Fibromyalgia    3. Degeneration of lumbar or lumbosacral intervertebral disc    4. Primary osteoarthritis of both knees        PLAN:  Informed verbal consent was obtained  -[place] record was obtained and reviewed  for the last one year and no indicators of drug misuse  were found. Any other controlled substance prescriptions  seen on the record have been accounted for, I am aware of the patient receiving these medications. Steven Becker  [place] record will be rechecked as part of office protocol.   -She was advised weight reduction, diet changes- 800-1200 idalia diet, diet diary, exercising, nutritional  consult increased physical activity as tolerated  -She was advised to increase fluids ( 5-7  glasses of fluid daily), limit caffeine, avoid cheese products, increase dietary fiber, increase activity and exercise as tolerated and relax regularly and enjoy meals   -walking/stretching exercises as advised       Current Outpatient Medications   Medication Sig Dispense Refill    topiramate (TOPAMAX) 25 MG tablet TAKE ONE TABLET BY without esophagitis were pertinent to this visit. Risks and benefits of the medications and other alternative treatments  including no treatment were discussed with the patient. The common side effects of these medications were also explained to the patient. Informed verbal consent was obtained. Goals of current treatment regimen include improvement in pain, restoration of functioning- with focus on improvement in physical performance, general activity, work or disability,emotional distress, health care utilization and  decreased medication consumption. Will continue to monitor progress towards achieving/maintaining therapeutic goals with special emphasis on  1. Improvement in perceived interfernce  of pain with ADL's. Ability to do home exercises independently. Ability to do household chores indoor and/or outdoor work and social and leisure activities. Improve psychosocial and physical functioning. - she is showing progression towards this treatment goal with the current regimen. She was advised against drinking alcohol with the narcotic pain medicines, advised against driving or handling machinery while adjusting the dose of medicines or if having cognitive  issues related to the current medications. Risk of overdose and death, if medicines not taken as prescribed, were also discussed. If the patient develops new symptoms or if the symptoms worsen, the patient should call the office. While transcribing every attempt was made to maintain the accuracy of the note in terms of it's contents,there may have been some errors made inadvertently. Thank you for allowing me to participate in the care of this patient. Caryle Ny, MD.    Cc: Dr.Anthony Misti Dang MD    I, [facility], scribing for in the presence  of Dr. Caryle Ny.   09/19/19  12:40 PM  Meenakshi Gross.  Joon Baez Assistant   I, Dr. Caryle Ny, personally performed the services described in this documentation as scribed by  [facility] MA

## 2019-09-23 ENCOUNTER — TELEPHONE (OUTPATIENT)
Dept: PAIN MANAGEMENT | Age: 64
End: 2019-09-23

## 2019-10-04 ENCOUNTER — HOSPITAL ENCOUNTER (OUTPATIENT)
Dept: MAMMOGRAPHY | Age: 64
Discharge: HOME | End: 2019-10-04
Payer: MEDICARE

## 2019-10-04 DIAGNOSIS — Z12.31 SCREENING MAMMOGRAM, ENCOUNTER FOR: ICD-10-CM

## 2019-10-04 PROCEDURE — 77067 SCR MAMMO BI INCL CAD: CPT

## 2019-11-07 ENCOUNTER — OFFICE VISIT (OUTPATIENT)
Dept: PAIN MANAGEMENT | Age: 64
End: 2019-11-07
Payer: MEDICARE

## 2019-11-07 VITALS
HEART RATE: 73 BPM | DIASTOLIC BLOOD PRESSURE: 74 MMHG | WEIGHT: 220 LBS | SYSTOLIC BLOOD PRESSURE: 122 MMHG | BODY MASS INDEX: 38.97 KG/M2

## 2019-11-07 DIAGNOSIS — F33.41 RECURRENT MAJOR DEPRESSIVE DISORDER, IN PARTIAL REMISSION (HCC): ICD-10-CM

## 2019-11-07 DIAGNOSIS — K21.9 GASTROESOPHAGEAL REFLUX DISEASE WITHOUT ESOPHAGITIS: ICD-10-CM

## 2019-11-07 DIAGNOSIS — F51.01 PRIMARY INSOMNIA: ICD-10-CM

## 2019-11-07 DIAGNOSIS — M17.0 PRIMARY OSTEOARTHRITIS OF BOTH KNEES: ICD-10-CM

## 2019-11-07 DIAGNOSIS — M79.7 FIBROMYALGIA: ICD-10-CM

## 2019-11-07 DIAGNOSIS — G89.4 CHRONIC PAIN SYNDROME: ICD-10-CM

## 2019-11-07 DIAGNOSIS — G43.709 CHRONIC MIGRAINE WITHOUT AURA WITHOUT STATUS MIGRAINOSUS, NOT INTRACTABLE: ICD-10-CM

## 2019-11-07 DIAGNOSIS — M51.37 DEGENERATION OF LUMBAR OR LUMBOSACRAL INTERVERTEBRAL DISC: ICD-10-CM

## 2019-11-07 PROCEDURE — 99214 OFFICE O/P EST MOD 30 MIN: CPT | Performed by: INTERNAL MEDICINE

## 2019-11-07 RX ORDER — OXYCODONE HYDROCHLORIDE 5 MG/1
5 TABLET ORAL EVERY 6 HOURS PRN
Qty: 126 TABLET | Refills: 0 | Status: SHIPPED | OUTPATIENT
Start: 2019-11-07 | End: 2019-12-19 | Stop reason: SDUPTHER

## 2019-11-07 RX ORDER — TOPIRAMATE 25 MG/1
TABLET ORAL
Qty: 60 TABLET | Refills: 1 | Status: SHIPPED | OUTPATIENT
Start: 2019-11-07 | End: 2019-12-19 | Stop reason: SDUPTHER

## 2019-11-07 RX ORDER — AMITRIPTYLINE HYDROCHLORIDE 25 MG/1
TABLET, FILM COATED ORAL
Qty: 180 TABLET | Refills: 0 | Status: SHIPPED | OUTPATIENT
Start: 2019-11-07 | End: 2020-01-30 | Stop reason: SDUPTHER

## 2019-11-07 RX ORDER — GABAPENTIN 600 MG/1
TABLET ORAL
Qty: 360 TABLET | Refills: 0 | Status: SHIPPED | OUTPATIENT
Start: 2019-11-07 | End: 2020-01-30 | Stop reason: SDUPTHER

## 2019-11-07 RX ORDER — DICLOFENAC SODIUM 75 MG/1
TABLET, DELAYED RELEASE ORAL
Qty: 180 TABLET | Refills: 0 | Status: SHIPPED | OUTPATIENT
Start: 2019-11-07 | End: 2020-01-30 | Stop reason: SDUPTHER

## 2019-11-07 RX ORDER — MORPHINE SULFATE 30 MG/1
30 TABLET, FILM COATED, EXTENDED RELEASE ORAL DAILY
Qty: 42 TABLET | Refills: 0 | Status: SHIPPED | OUTPATIENT
Start: 2019-11-07 | End: 2019-12-19 | Stop reason: SDUPTHER

## 2019-12-19 ENCOUNTER — OFFICE VISIT (OUTPATIENT)
Dept: PAIN MANAGEMENT | Age: 64
End: 2019-12-19
Payer: MEDICARE

## 2019-12-19 VITALS
BODY MASS INDEX: 37.2 KG/M2 | HEART RATE: 69 BPM | WEIGHT: 210 LBS | SYSTOLIC BLOOD PRESSURE: 117 MMHG | DIASTOLIC BLOOD PRESSURE: 73 MMHG

## 2019-12-19 DIAGNOSIS — M51.37 DEGENERATION OF LUMBAR OR LUMBOSACRAL INTERVERTEBRAL DISC: ICD-10-CM

## 2019-12-19 DIAGNOSIS — M17.0 PRIMARY OSTEOARTHRITIS OF BOTH KNEES: ICD-10-CM

## 2019-12-19 DIAGNOSIS — G89.4 CHRONIC PAIN SYNDROME: ICD-10-CM

## 2019-12-19 DIAGNOSIS — M79.7 FIBROMYALGIA: ICD-10-CM

## 2019-12-19 DIAGNOSIS — G43.709 CHRONIC MIGRAINE WITHOUT AURA WITHOUT STATUS MIGRAINOSUS, NOT INTRACTABLE: ICD-10-CM

## 2019-12-19 PROCEDURE — 99213 OFFICE O/P EST LOW 20 MIN: CPT | Performed by: INTERNAL MEDICINE

## 2019-12-19 RX ORDER — OXYCODONE HYDROCHLORIDE 5 MG/1
5 TABLET ORAL EVERY 6 HOURS PRN
Qty: 126 TABLET | Refills: 0 | Status: SHIPPED | OUTPATIENT
Start: 2019-12-19 | End: 2020-01-30 | Stop reason: SDUPTHER

## 2019-12-19 RX ORDER — TOPIRAMATE 25 MG/1
TABLET ORAL
Qty: 60 TABLET | Refills: 1 | Status: SHIPPED | OUTPATIENT
Start: 2019-12-19 | End: 2020-01-30 | Stop reason: SDUPTHER

## 2019-12-19 RX ORDER — MORPHINE SULFATE 30 MG/1
30 TABLET, FILM COATED, EXTENDED RELEASE ORAL DAILY
Qty: 42 TABLET | Refills: 0 | Status: SHIPPED | OUTPATIENT
Start: 2019-12-19 | End: 2020-01-30 | Stop reason: SDUPTHER

## 2020-01-30 ENCOUNTER — OFFICE VISIT (OUTPATIENT)
Dept: PAIN MANAGEMENT | Age: 65
End: 2020-01-30
Payer: MEDICARE

## 2020-01-30 VITALS
BODY MASS INDEX: 37.2 KG/M2 | SYSTOLIC BLOOD PRESSURE: 110 MMHG | WEIGHT: 210 LBS | DIASTOLIC BLOOD PRESSURE: 63 MMHG | HEART RATE: 68 BPM

## 2020-01-30 PROCEDURE — 99213 OFFICE O/P EST LOW 20 MIN: CPT | Performed by: INTERNAL MEDICINE

## 2020-01-30 RX ORDER — MORPHINE SULFATE 30 MG/1
30 TABLET, FILM COATED, EXTENDED RELEASE ORAL DAILY
Qty: 42 TABLET | Refills: 0 | Status: SHIPPED | OUTPATIENT
Start: 2020-01-30 | End: 2020-03-02 | Stop reason: SDUPTHER

## 2020-01-30 RX ORDER — GABAPENTIN 600 MG/1
TABLET ORAL
Qty: 360 TABLET | Refills: 0 | Status: SHIPPED | OUTPATIENT
Start: 2020-01-30 | End: 2020-03-02 | Stop reason: SDUPTHER

## 2020-01-30 RX ORDER — DICLOFENAC SODIUM 75 MG/1
TABLET, DELAYED RELEASE ORAL
Qty: 180 TABLET | Refills: 0 | Status: SHIPPED | OUTPATIENT
Start: 2020-01-30 | End: 2020-03-02 | Stop reason: SDUPTHER

## 2020-01-30 RX ORDER — TOPIRAMATE 25 MG/1
TABLET ORAL
Qty: 60 TABLET | Refills: 1 | Status: SHIPPED | OUTPATIENT
Start: 2020-01-30 | End: 2020-03-02 | Stop reason: SDUPTHER

## 2020-01-30 RX ORDER — OXYCODONE HYDROCHLORIDE 5 MG/1
5 TABLET ORAL EVERY 6 HOURS PRN
Qty: 126 TABLET | Refills: 0 | Status: SHIPPED | OUTPATIENT
Start: 2020-01-30 | End: 2020-03-02 | Stop reason: SDUPTHER

## 2020-01-30 RX ORDER — AMITRIPTYLINE HYDROCHLORIDE 25 MG/1
TABLET, FILM COATED ORAL
Qty: 180 TABLET | Refills: 0 | Status: SHIPPED | OUTPATIENT
Start: 2020-01-30 | End: 2020-03-02 | Stop reason: SDUPTHER

## 2020-01-30 NOTE — PROGRESS NOTES
Dionisio Liner  1955  <[id number]>      HISTORY OF PRESENT ILLNESS:  Ms. Walt Bender is a 77 y.o. female returns for a follow up visit for pain management  She has a diagnosis of   1. Chronic pain syndrome    2. Fibromyalgia    3. Degeneration of lumbar or lumbosacral intervertebral disc    4. Primary osteoarthritis of both knees    5. Chronic migraine without aura without status migrainosus, not intractable    6. Recurrent major depressive disorder, in partial remission ([facility] [address]    7. Primary insomnia    8. Gastroesophageal reflux disease without esophagitis    9. Arthritis    . She complains of pain in the Neck, low back, bilateral arms and bilateral legs She rates the pain 6/10 and describes it as sharp, aching, burning, numbness, pins and needles. Current treatment regimen has helped relieve about 60% of the pain. She denies any side effects from the current pain regimen. Patient reports that since the last follow up visit the physical functioning is unchanged, family/social relationships are unchanged, mood is unchanged sleep patterns are unchanged, and that the overall functioning is unchanged. Patient denies misusing/abusing her narcotic pain medications or using any illegal drugs. There are No indicators for possible drug abuse, addiction or diversion problems. Patient states she has been doing okay and medications have been helping. She denies any side effects. She mentions her headaches symptoms are manageable. She reports she is using Ms Contin with Oxycodone for BTP. She states she has been using all her adjuvants. She mentions her back still hurts the most.     ALLERGIES: Patients list of allergies were reviewed     MEDICATIONS: Ms. Walt Bender list of medications were reviewed. Her current medications are   Outpatient Medications Prior to Visit   Medication Sig Dispense Refill    topiramate (TOPAMAX) 25 MG tablet TAKE ONE TABLET BY MOUTH TWICE A DAY 60 tablet 1    morphine (MS CONTIN) 30 MG extended release tablet Take 1 tablet by mouth daily for 42 days. 42 tablet 0    oxyCODONE (ROXICODONE) 5 MG immediate release tablet Take 1 tablet by mouth every 6 hours as needed for Pain (max 3 per day) for up to 42 days. 126 tablet 0    amitriptyline (ELAVIL) 25 MG tablet TAKE ONE TO TWO TABLETS BY MOUTH ONCE NIGHTLY 180 tablet 0    diclofenac (VOLTAREN) 75 MG EC tablet TAKE ONE TABLET BY MOUTH TWICE A DAY FOR TWO WEEKS, THEN AS NEEDED 180 tablet 0    Naloxone HCl (EVZIO) 2 MG/0.4ML SOAJ Use as directed 1 Package 0    FLUoxetine (PROZAC) 40 MG capsule       ranitidine (ZANTAC) 300 MG tablet       Prenatal Vit-Fe Fumarate-FA (PNV PRENATAL PLUS MULTIVITAMIN) 27-1 MG TABS       Prenatal Vit-DSS-Fe Cbn-FA (VIRT-CHAN GT) 90-1 MG TABS       omeprazole (PRILOSEC) 40 MG capsule Take 40 mg by mouth daily.  KLOR-CON M20 20 MEQ tablet TAKE TWO TABLETS BY MOUTH TWICE A  tablet 3    VIVELLE-DOT 0.05 MG/24HR APPLY 1 PATCH TO SKIN TWICE WEEKLY 8 patch 11    furosemide (LASIX) 20 MG tablet Take 20 mg by mouth 3 times daily.  therapeutic multivitamin-minerals (THERAGRAN-M) tablet Take 1 tablet by mouth daily.  gabapentin (NEURONTIN) 600 MG tablet TAKE ONE TABLET BY MOUTH EVERY MORNING, 1 TABLET IN THE AFTERNOON, AND TAKE TWO TABLETS BY MOUTH EVERY EVENING 360 tablet 0     No facility-administered medications prior to visit. SOCIAL/FAMILY/PAST MEDICAL HISTORY: Ms. Estrada Foster, family and past medical history was reviewed. REVIEW OF SYSTEMS:    Respiratory: Negative for apnea, chest tightness and shortness of breath or change in baseline breathing. Gastrointestinal: Negative for nausea, vomiting, abdominal pain, diarrhea, constipation, blood in stool and abdominal distention. PHYSICAL EXAM:   Nursing note and vitals reviewed. /63   Pulse 68   Wt 210 lb (95.3 kg)   BMI 37.20 kg/m²   Constitutional: She appears well-developed and well-nourished. No acute distress. tablet TAKE ONE TABLET BY MOUTH TWICE A DAY FOR TWO WEEKS, THEN AS NEEDED 180 tablet 0    Naloxone HCl (EVZIO) 2 MG/0.4ML SOAJ Use as directed 1 Package 0    FLUoxetine (PROZAC) 40 MG capsule       ranitidine (ZANTAC) 300 MG tablet       Prenatal Vit-Fe Fumarate-FA (PNV PRENATAL PLUS MULTIVITAMIN) 27-1 MG TABS       Prenatal Vit-DSS-Fe Cbn-FA (VIRT-CHAN GT) 90-1 MG TABS       omeprazole (PRILOSEC) 40 MG capsule Take 40 mg by mouth daily.  KLOR-CON M20 20 MEQ tablet TAKE TWO TABLETS BY MOUTH TWICE A  tablet 3    VIVELLE-DOT 0.05 MG/24HR APPLY 1 PATCH TO SKIN TWICE WEEKLY 8 patch 11    furosemide (LASIX) 20 MG tablet Take 20 mg by mouth 3 times daily.  therapeutic multivitamin-minerals (THERAGRAN-M) tablet Take 1 tablet by mouth daily.  gabapentin (NEURONTIN) 600 MG tablet TAKE ONE TABLET BY MOUTH EVERY MORNING, 1 TABLET IN THE AFTERNOON, AND TAKE TWO TABLETS BY MOUTH EVERY EVENING 360 tablet 0     No current facility-administered medications for this visit. I will continue her current medication regimen  which is part of the above treatment schedule. It has been helping with Ms. Harris's chronic  medical problems which for this visit include:   Diagnoses of Chronic pain syndrome, Fibromyalgia, Degeneration of lumbar or lumbosacral intervertebral disc, Primary osteoarthritis of both knees, Chronic migraine without aura without status migrainosus, not intractable, Recurrent major depressive disorder, in partial remission ([address] Primary insomnia, Gastroesophageal reflux disease without esophagitis, and Arthritis were pertinent to this visit. Risks and benefits of the medications and other alternative treatments  including no treatment were discussed with the patient. The common side effects of these medications were also explained to the patient. Informed verbal consent was obtained.    Goals of current treatment regimen include improvement in pain, restoration of functioning- with focus on improvement in physical performance, general activity, work or disability,emotional distress, health care utilization and  decreased medication consumption. Will continue to monitor progress towards achieving/maintaining therapeutic goals with special emphasis on  1. Improvement in perceived interfernce  of pain with ADL's. Ability to do home exercises independently. Ability to do household chores indoor and/or outdoor work and social and leisure activities. Improve psychosocial and physical functioning. - she is showing progression towards this treatment goal with the current regimen. She was advised against drinking alcohol with the narcotic pain medicines, advised against driving or handling machinery while adjusting the dose of medicines or if having cognitive  issues related to the current medications. Risk of overdose and death, if medicines not taken as prescribed, were also discussed. If the patient develops new symptoms or if the symptoms worsen, the patient should call the office. While transcribing every attempt was made to maintain the accuracy of the note in terms of it's contents,there may have been some errors made inadvertently. Thank you for allowing me to participate in the care of this patient.     Severa Berry, MD.    Cc: Franky Olmedo MD

## 2020-01-31 ENCOUNTER — TELEPHONE (OUTPATIENT)
Dept: PAIN MANAGEMENT | Age: 65
End: 2020-01-31

## 2020-02-26 ENCOUNTER — HOSPITAL ENCOUNTER (OUTPATIENT)
Age: 65
Discharge: HOME | End: 2020-02-26
Payer: MEDICARE

## 2020-02-26 LAB
ALBUMIN SERPL-MCNC: 3.8 G/DL (ref 3.4–5)
ALP BLD-CCNC: 95 U/L (ref 40–129)
ALT SERPL-CCNC: 56 U/L (ref 10–40)
ANION GAP SERPL CALCULATED.3IONS-SCNC: 11 MMOL/L (ref 3–16)
AST SERPL-CCNC: 28 U/L (ref 15–37)
BASOPHILS ABSOLUTE: 0 K/UL (ref 0–0.2)
BASOPHILS RELATIVE PERCENT: 0.4 %
BILIRUB SERPL-MCNC: 0.4 MG/DL (ref 0–1)
BILIRUBIN DIRECT: <0.2 MG/DL (ref 0–0.3)
BILIRUBIN, INDIRECT: ABNORMAL MG/DL (ref 0–1)
BUN BLDV-MCNC: 16 MG/DL (ref 7–20)
CALCIUM SERPL-MCNC: 9.1 MG/DL (ref 8.3–10.6)
CHLORIDE BLD-SCNC: 106 MMOL/L (ref 99–110)
CHOLESTEROL, TOTAL: 148 MG/DL (ref 0–199)
CO2: 26 MMOL/L (ref 21–32)
CREAT SERPL-MCNC: 0.6 MG/DL (ref 0.6–1.2)
EOSINOPHILS ABSOLUTE: 0.1 K/UL (ref 0–0.6)
EOSINOPHILS RELATIVE PERCENT: 1.7 %
GFR AFRICAN AMERICAN: >60
GFR NON-AFRICAN AMERICAN: >60
GLUCOSE BLD-MCNC: 114 MG/DL (ref 70–99)
HCT VFR BLD CALC: 38.6 % (ref 36–48)
HDLC SERPL-MCNC: 55 MG/DL (ref 40–60)
HEMOGLOBIN: 12.9 G/DL (ref 12–16)
LDL CHOLESTEROL CALCULATED: 73 MG/DL
LYMPHOCYTES ABSOLUTE: 2 K/UL (ref 1–5.1)
LYMPHOCYTES RELATIVE PERCENT: 39.5 %
MCH RBC QN AUTO: 31 PG (ref 26–34)
MCHC RBC AUTO-ENTMCNC: 33.4 G/DL (ref 31–36)
MCV RBC AUTO: 92.8 FL (ref 80–100)
MONOCYTES ABSOLUTE: 0.4 K/UL (ref 0–1.3)
MONOCYTES RELATIVE PERCENT: 7.2 %
NEUTROPHILS ABSOLUTE: 2.7 K/UL (ref 1.7–7.7)
NEUTROPHILS RELATIVE PERCENT: 51.2 %
PDW BLD-RTO: 13.5 % (ref 12.4–15.4)
PHOSPHORUS: 3.3 MG/DL (ref 2.5–4.9)
PLATELET # BLD: 216 K/UL (ref 135–450)
PMV BLD AUTO: 6.8 FL (ref 5–10.5)
POTASSIUM SERPL-SCNC: 4 MMOL/L (ref 3.5–5.1)
RBC # BLD: 4.16 M/UL (ref 4–5.2)
SODIUM BLD-SCNC: 143 MMOL/L (ref 136–145)
TOTAL PROTEIN: 6.7 G/DL (ref 6.4–8.2)
TRIGL SERPL-MCNC: 98 MG/DL (ref 0–150)
TSH SERPL DL<=0.05 MIU/L-ACNC: 4.15 UIU/ML (ref 0.27–4.2)
VLDLC SERPL CALC-MCNC: 20 MG/DL
WBC # BLD: 5.2 K/UL (ref 4–11)

## 2020-02-26 PROCEDURE — 80076 HEPATIC FUNCTION PANEL: CPT

## 2020-02-26 PROCEDURE — 80061 LIPID PANEL: CPT

## 2020-02-26 PROCEDURE — 36415 COLL VENOUS BLD VENIPUNCTURE: CPT

## 2020-02-26 PROCEDURE — 80069 RENAL FUNCTION PANEL: CPT

## 2020-02-26 PROCEDURE — 84443 ASSAY THYROID STIM HORMONE: CPT

## 2020-02-26 PROCEDURE — 85025 COMPLETE CBC W/AUTO DIFF WBC: CPT

## 2020-03-02 ENCOUNTER — OFFICE VISIT (OUTPATIENT)
Dept: PAIN MANAGEMENT | Age: 65
End: 2020-03-02
Payer: MEDICARE

## 2020-03-02 VITALS
DIASTOLIC BLOOD PRESSURE: 66 MMHG | WEIGHT: 210 LBS | BODY MASS INDEX: 37.2 KG/M2 | SYSTOLIC BLOOD PRESSURE: 121 MMHG | HEART RATE: 72 BPM

## 2020-03-02 PROCEDURE — 99213 OFFICE O/P EST LOW 20 MIN: CPT | Performed by: INTERNAL MEDICINE

## 2020-03-02 RX ORDER — OXYCODONE HYDROCHLORIDE 5 MG/1
5 TABLET ORAL EVERY 6 HOURS PRN
Qty: 84 TABLET | Refills: 0 | Status: SHIPPED | OUTPATIENT
Start: 2020-03-02 | End: 2020-03-30 | Stop reason: SDUPTHER

## 2020-03-02 RX ORDER — AMITRIPTYLINE HYDROCHLORIDE 25 MG/1
TABLET, FILM COATED ORAL
Qty: 180 TABLET | Refills: 0 | Status: SHIPPED | OUTPATIENT
Start: 2020-03-02 | End: 2020-06-19 | Stop reason: SDUPTHER

## 2020-03-02 RX ORDER — TOPIRAMATE 25 MG/1
TABLET ORAL
Qty: 60 TABLET | Refills: 1 | Status: SHIPPED | OUTPATIENT
Start: 2020-03-02 | End: 2020-03-30 | Stop reason: SDUPTHER

## 2020-03-02 RX ORDER — DICLOFENAC SODIUM 75 MG/1
TABLET, DELAYED RELEASE ORAL
Qty: 180 TABLET | Refills: 0 | Status: SHIPPED | OUTPATIENT
Start: 2020-03-02 | End: 2020-04-27 | Stop reason: SDUPTHER

## 2020-03-02 RX ORDER — MORPHINE SULFATE 30 MG/1
30 TABLET, FILM COATED, EXTENDED RELEASE ORAL DAILY
Qty: 28 TABLET | Refills: 0 | Status: SHIPPED | OUTPATIENT
Start: 2020-03-02 | End: 2020-03-30 | Stop reason: SDUPTHER

## 2020-03-02 RX ORDER — GABAPENTIN 600 MG/1
TABLET ORAL
Qty: 360 TABLET | Refills: 0 | Status: SHIPPED | OUTPATIENT
Start: 2020-03-02 | End: 2020-06-19 | Stop reason: SDUPTHER

## 2020-03-02 NOTE — PROGRESS NOTES
(EVZIO) 2 MG/0.4ML SOAJ Use as directed 1 Package 0    FLUoxetine (PROZAC) 40 MG capsule       ranitidine (ZANTAC) 300 MG tablet       Prenatal Vit-Fe Fumarate-FA (PNV PRENATAL PLUS MULTIVITAMIN) 27-1 MG TABS       Prenatal Vit-DSS-Fe Cbn-FA (VIRT-CHAN GT) 90-1 MG TABS       omeprazole (PRILOSEC) 40 MG capsule Take 40 mg by mouth daily.  KLOR-CON M20 20 MEQ tablet TAKE TWO TABLETS BY MOUTH TWICE A  tablet 3    VIVELLE-DOT 0.05 MG/24HR APPLY 1 PATCH TO SKIN TWICE WEEKLY 8 patch 11    furosemide (LASIX) 20 MG tablet Take 20 mg by mouth 3 times daily.  therapeutic multivitamin-minerals (THERAGRAN-M) tablet Take 1 tablet by mouth daily.  gabapentin (NEURONTIN) 600 MG tablet TAKE ONE TABLET BY MOUTH EVERY MORNING, 1 TABLET IN THE AFTERNOON, AND TAKE TWO TABLETS BY MOUTH EVERY EVENING 360 tablet 0     No current facility-administered medications for this visit. I will continue her current medication regimen  which is part of the above treatment schedule. It has been helping with Ms. Harris's chronic  medical problems which for this visit include:   Diagnoses of Chronic pain syndrome, Fibromyalgia, Degeneration of lumbar or lumbosacral intervertebral disc, Primary osteoarthritis of both knees, Chronic migraine without aura without status migrainosus, not intractable, Recurrent major depressive disorder, in partial remission ([facility] [address] Primary insomnia, and Gastroesophageal reflux disease without esophagitis were pertinent to this visit. Risks and benefits of the medications and other alternative treatments  including no treatment were discussed with the patient. The common side effects of these medications were also explained to the patient. Informed verbal consent was obtained.    Goals of current treatment regimen include improvement in pain, restoration of functioning- with focus on improvement in physical performance, general activity, work or disability,emotional distress, health care utilization and  decreased medication consumption. Will continue to monitor progress towards achieving/maintaining therapeutic goals with special emphasis on  1. Improvement in perceived interfernce  of pain with ADL's. Ability to do home exercises independently. Ability to do household chores indoor and/or outdoor work and social and leisure activities. Improve psychosocial and physical functioning. - she is showing progression towards this treatment goal with the current regimen. She was advised against drinking alcohol with the narcotic pain medicines, advised against driving or handling machinery while adjusting the dose of medicines or if having cognitive  issues related to the current medications. Risk of overdose and death, if medicines not taken as prescribed, were also discussed. If the patient develops new symptoms or if the symptoms worsen, the patient should call the office. While transcribing every attempt was made to maintain the accuracy of the note in terms of it's contents,there may have been some errors made inadvertently. Thank you for allowing me to participate in the care of this patient.     Pérez Espino MD.    Cc: Michele Bazan MD

## 2020-03-03 ENCOUNTER — TELEPHONE (OUTPATIENT)
Dept: PAIN MANAGEMENT | Age: 65
End: 2020-03-03

## 2020-03-30 ENCOUNTER — OFFICE VISIT (OUTPATIENT)
Dept: PAIN MANAGEMENT | Age: 65
End: 2020-03-30
Payer: MEDICARE

## 2020-03-30 VITALS
SYSTOLIC BLOOD PRESSURE: 125 MMHG | WEIGHT: 210 LBS | TEMPERATURE: 97 F | BODY MASS INDEX: 37.2 KG/M2 | HEART RATE: 70 BPM | DIASTOLIC BLOOD PRESSURE: 74 MMHG

## 2020-03-30 PROCEDURE — 99213 OFFICE O/P EST LOW 20 MIN: CPT | Performed by: INTERNAL MEDICINE

## 2020-03-30 RX ORDER — OXYCODONE HYDROCHLORIDE 5 MG/1
5 TABLET ORAL EVERY 6 HOURS PRN
Qty: 84 TABLET | Refills: 0 | Status: SHIPPED | OUTPATIENT
Start: 2020-03-30 | End: 2020-04-27 | Stop reason: SDUPTHER

## 2020-03-30 RX ORDER — MORPHINE SULFATE 30 MG/1
30 TABLET, FILM COATED, EXTENDED RELEASE ORAL DAILY
Qty: 28 TABLET | Refills: 0 | Status: SHIPPED | OUTPATIENT
Start: 2020-03-30 | End: 2020-04-27 | Stop reason: SDUPTHER

## 2020-03-30 RX ORDER — TOPIRAMATE 25 MG/1
TABLET ORAL
Qty: 60 TABLET | Refills: 1 | Status: SHIPPED | OUTPATIENT
Start: 2020-03-30 | End: 2020-06-19 | Stop reason: SDUPTHER

## 2020-03-30 NOTE — PROGRESS NOTES
Sebastian Rao  1955  <[id number]>      HISTORY OF PRESENT ILLNESS:  Ms. Ronel Martinez is a 77 y.o. female returns for a follow up visit for pain management  She has a diagnosis of   1. Chronic pain syndrome    2. Fibromyalgia    3. Degeneration of lumbar or lumbosacral intervertebral disc    4. Primary osteoarthritis of both knees    5. Chronic migraine without aura without status migrainosus, not intractable    6. Recurrent major depressive disorder, in partial remission ([facility] [address]    7. Primary insomnia    8. Gastroesophageal reflux disease without esophagitis    9. Chronic tension-type headache, not intractable    10. Arthritis    . She complains of pain in the bilateral shoulders, neck, lower back, bilateral elbows, bilateral arms/hands with pain radiating down bilateral feet She rates the pain 6/10 and describes it as aching, burning, throbbing. Current treatment regimen has helped relieve about 60% of the pain. She denies any side effects from the current pain regimen. Patient reports that since the last follow up visit the physical functioning is unchanged, family/social relationships are unchanged, mood is unchanged sleep patterns are unchanged, and that the overall functioning is unchanged. Patient denies misusing/abusing her narcotic pain medications or using any illegal drugs. There are No indicators for possible drug abuse, addiction or diversion problems. Ms. Ronel Martinez states she has been doing about the same, managing with the regimen. She states the medications have been helping. She states she is using Elavil which is helping with sleep. Patient reports she is managing with her ADL's and house chores. Patient reports her weight has been stable. ALLERGIES: Patients list of allergies were reviewed     MEDICATIONS: Ms. Ronel Martinez list of medications were reviewed. Her current medications are   Outpatient Medications Prior to Visit   Medication Sig Dispense Refill    topiramate (TOPAMAX) 25 MG tablet TAKE ONE TABLET BY MOUTH TWICE A DAY 60 tablet 1    morphine (MS CONTIN) 30 MG extended release tablet Take 1 tablet by mouth daily for 28 days. 28 tablet 0    oxyCODONE (ROXICODONE) 5 MG immediate release tablet Take 1 tablet by mouth every 6 hours as needed for Pain (max 3 per day) for up to 28 days. 84 tablet 0    amitriptyline (ELAVIL) 25 MG tablet TAKE ONE TO TWO TABLETS BY MOUTH ONCE NIGHTLY 180 tablet 0    diclofenac (VOLTAREN) 75 MG EC tablet TAKE ONE TABLET BY MOUTH TWICE A DAY FOR TWO WEEKS, THEN AS NEEDED 180 tablet 0    gabapentin (NEURONTIN) 600 MG tablet TAKE ONE TABLET BY MOUTH EVERY MORNING, 1 TABLET IN THE AFTERNOON, AND TAKE TWO TABLETS BY MOUTH EVERY EVENING 360 tablet 0    Naloxone HCl (EVZIO) 2 MG/0.4ML SOAJ Use as directed 1 Package 0    FLUoxetine (PROZAC) 40 MG capsule       ranitidine (ZANTAC) 300 MG tablet       Prenatal Vit-Fe Fumarate-FA (PNV PRENATAL PLUS MULTIVITAMIN) 27-1 MG TABS       Prenatal Vit-DSS-Fe Cbn-FA (VIRT-CHAN GT) 90-1 MG TABS       omeprazole (PRILOSEC) 40 MG capsule Take 40 mg by mouth daily.  KLOR-CON M20 20 MEQ tablet TAKE TWO TABLETS BY MOUTH TWICE A  tablet 3    VIVELLE-DOT 0.05 MG/24HR APPLY 1 PATCH TO SKIN TWICE WEEKLY 8 patch 11    furosemide (LASIX) 20 MG tablet Take 20 mg by mouth 3 times daily.  therapeutic multivitamin-minerals (THERAGRAN-M) tablet Take 1 tablet by mouth daily. No facility-administered medications prior to visit. SOCIAL/FAMILY/PAST MEDICAL HISTORY: Ms. Albertina Bailon, family and past medical history was reviewed. REVIEW OF SYSTEMS:    Respiratory: Negative for apnea, chest tightness and shortness of breath or change in baseline breathing. Gastrointestinal: Negative for nausea, vomiting, abdominal pain, diarrhea, constipation, blood in stool and abdominal distention. PHYSICAL EXAM:   Nursing note and vitals reviewed.  /74   Pulse 70   Temp 97 °F (36.1 °C)   Wt 210 lb (95.3 NIGHTLY 180 tablet 0    diclofenac (VOLTAREN) 75 MG EC tablet TAKE ONE TABLET BY MOUTH TWICE A DAY FOR TWO WEEKS, THEN AS NEEDED 180 tablet 0    gabapentin (NEURONTIN) 600 MG tablet TAKE ONE TABLET BY MOUTH EVERY MORNING, 1 TABLET IN THE AFTERNOON, AND TAKE TWO TABLETS BY MOUTH EVERY EVENING 360 tablet 0    Naloxone HCl (EVZIO) 2 MG/0.4ML SOAJ Use as directed 1 Package 0    FLUoxetine (PROZAC) 40 MG capsule       ranitidine (ZANTAC) 300 MG tablet       Prenatal Vit-Fe Fumarate-FA (PNV PRENATAL PLUS MULTIVITAMIN) 27-1 MG TABS       Prenatal Vit-DSS-Fe Cbn-FA (VIRT-CHAN GT) 90-1 MG TABS       omeprazole (PRILOSEC) 40 MG capsule Take 40 mg by mouth daily.  KLOR-CON M20 20 MEQ tablet TAKE TWO TABLETS BY MOUTH TWICE A  tablet 3    VIVELLE-DOT 0.05 MG/24HR APPLY 1 PATCH TO SKIN TWICE WEEKLY 8 patch 11    furosemide (LASIX) 20 MG tablet Take 20 mg by mouth 3 times daily.  therapeutic multivitamin-minerals (THERAGRAN-M) tablet Take 1 tablet by mouth daily. No current facility-administered medications for this visit. I will continue her current medication regimen  which is part of the above treatment schedule. It has been helping with Ms. Harris's chronic  medical problems which for this visit include:   Diagnoses of Chronic pain syndrome, Fibromyalgia, Degeneration of lumbar or lumbosacral intervertebral disc, Primary osteoarthritis of both knees, Chronic migraine without aura without status migrainosus, not intractable, Recurrent major depressive disorder, in partial remission ([address] Primary insomnia, Gastroesophageal reflux disease without esophagitis, Chronic tension-type headache, not intractable, and Arthritis were pertinent to this visit. Risks and benefits of the medications and other alternative treatments  including no treatment were discussed with the patient. The common side effects of these medications were also explained to the patient.   Informed verbal consent was

## 2020-04-27 ENCOUNTER — VIRTUAL VISIT (OUTPATIENT)
Dept: PAIN MANAGEMENT | Age: 65
End: 2020-04-27
Payer: MEDICARE

## 2020-04-27 PROCEDURE — 99213 OFFICE O/P EST LOW 20 MIN: CPT | Performed by: INTERNAL MEDICINE

## 2020-04-27 RX ORDER — MORPHINE SULFATE 30 MG/1
30 TABLET, FILM COATED, EXTENDED RELEASE ORAL DAILY
Qty: 28 TABLET | Refills: 0 | Status: SHIPPED | OUTPATIENT
Start: 2020-04-27 | End: 2020-05-22 | Stop reason: SDUPTHER

## 2020-04-27 RX ORDER — OXYCODONE HYDROCHLORIDE 5 MG/1
5 TABLET ORAL EVERY 6 HOURS PRN
Qty: 84 TABLET | Refills: 0 | Status: SHIPPED | OUTPATIENT
Start: 2020-04-27 | End: 2020-05-22 | Stop reason: SDUPTHER

## 2020-04-27 RX ORDER — DICLOFENAC SODIUM 75 MG/1
TABLET, DELAYED RELEASE ORAL
Qty: 180 TABLET | Refills: 0
Start: 2020-04-27 | End: 2020-06-19 | Stop reason: SDUPTHER

## 2020-04-27 NOTE — PROGRESS NOTES
aching, burning. Current treatment regimen has helped relieve about 60% of the pain. She denies any side effects from the current pain regimen. Patient reports that since the last follow up visit the physical functioning is unchanged, family/social relationships are unchanged, mood is better sleep patterns are better, and that the overall functioning is unchanged. Patient denies misusing/abusing her narcotic pain medications or using any illegal drugs. There are No indicators for possible drug abuse, addiction or diversion problems. Patient states she has bene doing fair and is managing with the medications. She says she is using Ms Contin with Oxycodone 3 per day, which is helping with the pain. She denies any constipation symptoms. She reports she is using Topamax. She states sleep has been okay. She mentions she has been managing her house chores and ADLs. ALLERGIES: Patients list of allergies were reviewed     MEDICATIONS: Ms. Vicky Choi list of medications were reviewed. Her current medications are   Outpatient Medications Prior to Visit   Medication Sig Dispense Refill    topiramate (TOPAMAX) 25 MG tablet TAKE ONE TABLET BY MOUTH TWICE A DAY 60 tablet 1    morphine (MS CONTIN) 30 MG extended release tablet Take 1 tablet by mouth daily for 28 days. 28 tablet 0    oxyCODONE (ROXICODONE) 5 MG immediate release tablet Take 1 tablet by mouth every 6 hours as needed for Pain (max 3 per day) for up to 28 days.  84 tablet 0    amitriptyline (ELAVIL) 25 MG tablet TAKE ONE TO TWO TABLETS BY MOUTH ONCE NIGHTLY 180 tablet 0    diclofenac (VOLTAREN) 75 MG EC tablet TAKE ONE TABLET BY MOUTH TWICE A DAY FOR TWO WEEKS, THEN AS NEEDED 180 tablet 0    Naloxone HCl (EVZIO) 2 MG/0.4ML SOAJ Use as directed 1 Package 0    FLUoxetine (PROZAC) 40 MG capsule       Prenatal Vit-Fe Fumarate-FA (PNV PRENATAL PLUS MULTIVITAMIN) 27-1 MG TABS       Prenatal Vit-DSS-Fe Cbn-FA (VIRT-CHAN GT) 90-1 MG TABS       omeprazole (PRILOSEC) 40 MG capsule Take 40 mg by mouth daily.  KLOR-CON M20 20 MEQ tablet TAKE TWO TABLETS BY MOUTH TWICE A  tablet 3    VIVELLE-DOT 0.05 MG/24HR APPLY 1 PATCH TO SKIN TWICE WEEKLY 8 patch 11    furosemide (LASIX) 20 MG tablet Take 20 mg by mouth 3 times daily.  therapeutic multivitamin-minerals (THERAGRAN-M) tablet Take 1 tablet by mouth daily.  gabapentin (NEURONTIN) 600 MG tablet TAKE ONE TABLET BY MOUTH EVERY MORNING, 1 TABLET IN THE AFTERNOON, AND TAKE TWO TABLETS BY MOUTH EVERY EVENING 360 tablet 0    ranitidine (ZANTAC) 300 MG tablet        No facility-administered medications prior to visit. SOCIAL/FAMILY/PAST MEDICAL HISTORY: Ms. Lezlie Sever, family and past medical history was reviewed. REVIEW OF SYSTEMS:    Respiratory: Negative for apnea, chest tightness and shortness of breath or change in baseline breathing. Gastrointestinal: Negative for nausea, vomiting, abdominal pain, diarrhea, constipation, blood in stool and abdominal distention. PHYSICAL EXAM:   Nursing note and vitals per patient reviewed. There were no vitals taken for this visit. Constitutional: She appears well-developed and well-nourished. No acute distress. No respiratory distress. Skin: Skin appears to be warm and dry. No rashes or any other marks noted. She is not diaphoretic. Neurological/Psychiatric:She is alert and oriented to person, place, and time. Coordination is  normal.  Her mood isAppropriate and affect is Anxious. Musculoskeletal / Extremities: Range of motion is normal. Gait is normal, assistive devices use: none. IMPRESSION:   1. Chronic pain syndrome    2. Fibromyalgia    3. Degeneration of lumbar or lumbosacral intervertebral disc    4. Primary osteoarthritis of both knees    5.  Arthritis        PLAN:  Informed verbal consent regarding treatment was obtained  -[place] was obtained and reviewed  for the last one year and no indicators of drug misuse

## 2020-05-22 ENCOUNTER — VIRTUAL VISIT (OUTPATIENT)
Dept: PAIN MANAGEMENT | Age: 65
End: 2020-05-22
Payer: MEDICARE

## 2020-05-22 PROCEDURE — 99213 OFFICE O/P EST LOW 20 MIN: CPT | Performed by: INTERNAL MEDICINE

## 2020-05-22 RX ORDER — MORPHINE SULFATE 30 MG/1
30 TABLET, FILM COATED, EXTENDED RELEASE ORAL DAILY
Qty: 28 TABLET | Refills: 0 | Status: SHIPPED | OUTPATIENT
Start: 2020-05-22 | End: 2020-06-19 | Stop reason: SDUPTHER

## 2020-05-22 RX ORDER — OXYCODONE HYDROCHLORIDE 5 MG/1
5 TABLET ORAL EVERY 6 HOURS PRN
Qty: 84 TABLET | Refills: 0 | Status: SHIPPED | OUTPATIENT
Start: 2020-05-22 | End: 2020-06-19 | Stop reason: SDUPTHER

## 2020-05-22 NOTE — PROGRESS NOTES
daily.      therapeutic multivitamin-minerals (THERAGRAN-M) tablet Take 1 tablet by mouth daily.  gabapentin (NEURONTIN) 600 MG tablet TAKE ONE TABLET BY MOUTH EVERY MORNING, 1 TABLET IN THE AFTERNOON, AND TAKE TWO TABLETS BY MOUTH EVERY EVENING 360 tablet 0     No facility-administered medications prior to visit. SOCIAL/FAMILY/PAST MEDICAL HISTORY: Ms. Caprice Carrillo, family and past medical history was reviewed. REVIEW OF SYSTEMS:    Respiratory: Negative for apnea, chest tightness and shortness of breath or change in baseline breathing. Gastrointestinal: Negative for nausea, vomiting, abdominal pain, diarrhea, constipation, blood in stool and abdominal distention. PHYSICAL EXAM:   Nursing note and vitals per patient reviewed. There were no vitals taken for this visit. Constitutional: She appears well-developed and well-nourished. No acute distress. No respiratory distress. Skin: Skin appears to be warm and dry. No rashes or any other marks noted. She is not diaphoretic. Respiratory/Pulmonary: NO conversational dyspnea, no accessory muscle use, no coughing during exam. She does not appear to be in labored breathing. Neurological/Psychiatric:She is alert and oriented to person, place, and time. Coordination is  normal.  Her mood isAppropriate and affect is Neutral/Euthymic(normal). Musculoskeletal / Extremities: Range of motion is normal. Gait is normal, assistive devices use: none. IMPRESSION:   1. Chronic pain syndrome    2. Fibromyalgia    3. Degeneration of lumbar or lumbosacral intervertebral disc    4. Primary osteoarthritis of both knees    5. Arthritis        PLAN:  Informed verbal consent regarding treatment was obtained  -[place] record was obtained and reviewed  for the last one year and no indicators of drug misuse  were found.  Any other controlled substance prescriptions  seen on the record have been accounted for, I am aware of the patient receiving these were also discussed. If the patient develops new symptoms or if the symptoms worsen, the patient should call the office. While transcribing every attempt was made to maintain the accuracy of the note in terms of it's contents,there may have been some errors made inadvertently. Thank you for allowing me to participate in the care of this patient.     Min Balderas MD.    Cc: Spring Barnes MD

## 2020-06-19 ENCOUNTER — VIRTUAL VISIT (OUTPATIENT)
Dept: PAIN MANAGEMENT | Age: 65
End: 2020-06-19
Payer: MEDICARE

## 2020-06-19 PROCEDURE — 99213 OFFICE O/P EST LOW 20 MIN: CPT | Performed by: INTERNAL MEDICINE

## 2020-06-19 RX ORDER — TOPIRAMATE 25 MG/1
TABLET ORAL
Qty: 60 TABLET | Refills: 1 | Status: SHIPPED | OUTPATIENT
Start: 2020-06-19 | End: 2020-09-14 | Stop reason: SDUPTHER

## 2020-06-19 RX ORDER — MORPHINE SULFATE 30 MG/1
30 TABLET, FILM COATED, EXTENDED RELEASE ORAL DAILY
Qty: 28 TABLET | Refills: 0 | Status: SHIPPED | OUTPATIENT
Start: 2020-06-19 | End: 2020-07-17 | Stop reason: SDUPTHER

## 2020-06-19 RX ORDER — GABAPENTIN 600 MG/1
TABLET ORAL
Qty: 360 TABLET | Refills: 0 | Status: SHIPPED | OUTPATIENT
Start: 2020-06-19 | End: 2020-09-14 | Stop reason: SDUPTHER

## 2020-06-19 RX ORDER — AMITRIPTYLINE HYDROCHLORIDE 25 MG/1
TABLET, FILM COATED ORAL
Qty: 180 TABLET | Refills: 0 | Status: SHIPPED | OUTPATIENT
Start: 2020-06-19 | End: 2020-09-14 | Stop reason: SDUPTHER

## 2020-06-19 RX ORDER — DICLOFENAC SODIUM 75 MG/1
TABLET, DELAYED RELEASE ORAL
Qty: 180 TABLET | Refills: 0 | Status: SHIPPED | OUTPATIENT
Start: 2020-06-19 | End: 2020-08-14 | Stop reason: DRUGHIGH

## 2020-06-19 RX ORDER — OXYCODONE HYDROCHLORIDE 5 MG/1
5 TABLET ORAL EVERY 6 HOURS PRN
Qty: 84 TABLET | Refills: 0 | Status: SHIPPED | OUTPATIENT
Start: 2020-06-19 | End: 2020-07-17 | Stop reason: SDUPTHER

## 2020-06-30 ENCOUNTER — HOSPITAL ENCOUNTER (OUTPATIENT)
Age: 65
Discharge: HOME | End: 2020-06-30
Payer: MEDICARE

## 2020-06-30 ENCOUNTER — HOSPITAL ENCOUNTER (OUTPATIENT)
Dept: GENERAL RADIOLOGY | Age: 65
Discharge: HOME | End: 2020-06-30
Payer: MEDICARE

## 2020-06-30 ENCOUNTER — TELEPHONE (OUTPATIENT)
Dept: PAIN MANAGEMENT | Age: 65
End: 2020-06-30

## 2020-06-30 PROCEDURE — 72100 X-RAY EXAM L-S SPINE 2/3 VWS: CPT

## 2020-06-30 NOTE — TELEPHONE ENCOUNTER
[employer] [place] requesting early refill for  (NEURONTIN) 600 MG tablet patient going out of town for the holiday.   Pl's advise

## 2020-07-17 ENCOUNTER — VIRTUAL VISIT (OUTPATIENT)
Dept: PAIN MANAGEMENT | Age: 65
End: 2020-07-17
Payer: MEDICARE

## 2020-07-17 PROCEDURE — 99213 OFFICE O/P EST LOW 20 MIN: CPT | Performed by: INTERNAL MEDICINE

## 2020-07-17 RX ORDER — CYCLOBENZAPRINE HCL 5 MG
5 TABLET ORAL DAILY PRN
COMMUNITY
End: 2020-07-17

## 2020-07-17 RX ORDER — MORPHINE SULFATE 30 MG/1
30 TABLET, FILM COATED, EXTENDED RELEASE ORAL DAILY
Qty: 42 TABLET | Refills: 0 | Status: SHIPPED | OUTPATIENT
Start: 2020-07-17 | End: 2020-08-14 | Stop reason: SDUPTHER

## 2020-07-17 RX ORDER — OXYCODONE HYDROCHLORIDE 5 MG/1
5 TABLET ORAL EVERY 6 HOURS PRN
Qty: 126 TABLET | Refills: 0 | Status: SHIPPED | OUTPATIENT
Start: 2020-07-17 | End: 2020-08-14 | Stop reason: SDUPTHER

## 2020-07-17 NOTE — PROGRESS NOTES
TELE HEALTH VISIT (AUDIO-VISUAL)    Pursuant to the emergency declaration under the [facility], [facility] waiver authority and the [employer] and [employer] Act, this Virtual  Visit was conducted, with patient's/legal guardian's consent, to reduce the patient's risk of exposure to COVID-19 and provide continuity of care for an established patient. Service is  provided through a video synchronous discussion virtually to substitute for in-person clinic visit. Due to this being a TeleHealth encounter (During ILB-33 public health emergency), evaluation of the following organ systems was limited: Vitals/Constitutional/EENT/Resp/CV/GI//MS/Neuro/Skin/Qvxx-Ujvl-Jye. Martha Proffer  1955  <[id number]>    Ms. Delmy Mckenzie is being seen virtually for a follow up visit using one of the following techniques  Google Duo  Informed verbal consent to the virtual visit was obtained from Ms. Delmy Mckenzie. Risks associated with HIPPA compliance with the virtual visit was explained to the patient. Ms. Delmy Mckenzie is at her residence and Dr. Reilly Tyler is in his office. HISTORY OF PRESENT ILLNESS:  Ms. Delmy Mckenzie is a 77 y.o. female  being assessed for a follow up visit for pain management for evaluation of ongoing care regarding her symptoms and monitoring of compliance with long term use high risk medications. She has a diagnosis of   1. Chronic pain syndrome    2. Fibromyalgia    3. Degeneration of lumbar or lumbosacral intervertebral disc    4. Primary osteoarthritis of both knees    . She complains of pain in the generalize pain all over  with radiation to the limbs . She rates the pain 5/10 and describes it as sharp, aching, burning, numbness, pins and needles. Current treatment regimen has helped relieve about 50% of the pain. She denies any side effects from the current pain regimen.  Patient reports that since the last follow up visit the physical functioning is unchanged, family/social relationships are unchanged, mood is unchanged sleep patterns are unchanged, and that the overall functioning is unchanged. Patient denies misusing/abusing her narcotic pain medications or using any illegal drugs. There are No indicators for possible drug abuse, addiction or diversion problems. Ms. Aminah Box states she has been doing fair. She states she was given some muscle relaxer's for severe pain in the back along with Mobic . Patient denies any constipation or GERD symptoms. She says her headache symptoms are ok. Patient reports her weight has been stable. ALLERGIES: Patients list of allergies were reviewed     MEDICATIONS: Ms. Aminah Box list of medications were reviewed. Her current medications are   Outpatient Medications Prior to Visit   Medication Sig Dispense Refill    cyclobenzaprine (FLEXERIL) 5 MG tablet Take 5 mg by mouth daily as needed for Muscle spasms      morphine (MS CONTIN) 30 MG extended release tablet Take 1 tablet by mouth daily for 28 days. 28 tablet 0    oxyCODONE (ROXICODONE) 5 MG immediate release tablet Take 1 tablet by mouth every 6 hours as needed for Pain (max 3 per day) for up to 28 days.  84 tablet 0    diclofenac (VOLTAREN) 75 MG EC tablet Take one tablet po QD as needed 180 tablet 0    topiramate (TOPAMAX) 25 MG tablet TAKE ONE TABLET BY MOUTH TWICE A DAY 60 tablet 1    amitriptyline (ELAVIL) 25 MG tablet TAKE ONE TO TWO TABLETS BY MOUTH ONCE NIGHTLY 180 tablet 0    gabapentin (NEURONTIN) 600 MG tablet TAKE ONE TABLET BY MOUTH EVERY MORNING, 1 TABLET IN THE AFTERNOON, AND TAKE TWO TABLETS BY MOUTH EVERY EVENING 360 tablet 0    Naloxone HCl (EVZIO) 2 MG/0.4ML SOAJ Use as directed 1 Package 0    FLUoxetine (PROZAC) 40 MG capsule       Prenatal Vit-Fe Fumarate-FA (PNV PRENATAL PLUS MULTIVITAMIN) 27-1 MG TABS       Prenatal Vit-DSS-Fe Cbn-FA (VIRT-CHAN GT) 90-1 MG TABS       omeprazole (PRILOSEC) 40 MG capsule Take 40 mg by products, increase dietary fiber, increase activity and exercise as tolerated and relax regularly and enjoy meals   -records from PCP reviewed  -d/c Flexeril and Mobic  -ROM/Stretching exercises as advised    Current Outpatient Medications   Medication Sig Dispense Refill    cyclobenzaprine (FLEXERIL) 5 MG tablet Take 5 mg by mouth daily as needed for Muscle spasms      morphine (MS CONTIN) 30 MG extended release tablet Take 1 tablet by mouth daily for 28 days. 28 tablet 0    oxyCODONE (ROXICODONE) 5 MG immediate release tablet Take 1 tablet by mouth every 6 hours as needed for Pain (max 3 per day) for up to 28 days. 84 tablet 0    diclofenac (VOLTAREN) 75 MG EC tablet Take one tablet po QD as needed 180 tablet 0    topiramate (TOPAMAX) 25 MG tablet TAKE ONE TABLET BY MOUTH TWICE A DAY 60 tablet 1    amitriptyline (ELAVIL) 25 MG tablet TAKE ONE TO TWO TABLETS BY MOUTH ONCE NIGHTLY 180 tablet 0    gabapentin (NEURONTIN) 600 MG tablet TAKE ONE TABLET BY MOUTH EVERY MORNING, 1 TABLET IN THE AFTERNOON, AND TAKE TWO TABLETS BY MOUTH EVERY EVENING 360 tablet 0    Naloxone HCl (EVZIO) 2 MG/0.4ML SOAJ Use as directed 1 Package 0    FLUoxetine (PROZAC) 40 MG capsule       Prenatal Vit-Fe Fumarate-FA (PNV PRENATAL PLUS MULTIVITAMIN) 27-1 MG TABS       Prenatal Vit-DSS-Fe Cbn-FA (VIRT-CHAN GT) 90-1 MG TABS       omeprazole (PRILOSEC) 40 MG capsule Take 40 mg by mouth daily.  KLOR-CON M20 20 MEQ tablet TAKE TWO TABLETS BY MOUTH TWICE A  tablet 3    VIVELLE-DOT 0.05 MG/24HR APPLY 1 PATCH TO SKIN TWICE WEEKLY 8 patch 11    furosemide (LASIX) 20 MG tablet Take 20 mg by mouth 3 times daily.  therapeutic multivitamin-minerals (THERAGRAN-M) tablet Take 1 tablet by mouth daily. No current facility-administered medications for this visit. I will continue her current medication regimen  which is part of the above treatment schedule. It has been helping with Ms. Harris's chronic  medical problems which for this visit include:   Diagnoses of Chronic pain syndrome, Fibromyalgia, Degeneration of lumbar or lumbosacral intervertebral disc, and Primary osteoarthritis of both knees were pertinent to this visit. Risks and benefits of the medications and other alternative treatments  including no treatment were discussed with the patient. The common side effects of these medications were also explained to the patient. Informed verbal consent was obtained. Goals of current treatment regimen include improvement in pain, restoration of functioning- with focus on improvement in physical performance, general activity, work or disability,emotional distress, health care utilization and  decreased medication consumption. Will continue to monitor progress towards achieving/maintaining therapeutic goals with special emphasis on  1. Improvement in perceived interfernce  of pain with ADL's. Ability to do home exercises independently. Ability to do household chores indoor and/or outdoor work and social and leisure activities. Improve psychosocial and physical functioning. - she is showing progression towards this treatment goal with the current regimen. She was advised against drinking alcohol with the narcotic pain medicines, advised against driving or handling machinery while adjusting the dose of medicines or if having cognitive  issues related to the current medications. Risk of overdose and death, if medicines not taken as prescribed, were also discussed. If the patient develops new symptoms or if the symptoms worsen, the patient should call the office. While transcribing every attempt was made to maintain the accuracy of the note in terms of it's contents,there may have been some errors made inadvertently. Thank you for allowing me to participate in the care of this patient.     Neyda Valdez MD.    Cc: Nicki Gaitan MD

## 2020-07-20 ENCOUNTER — TELEPHONE (OUTPATIENT)
Dept: PAIN MANAGEMENT | Age: 65
End: 2020-07-20

## 2020-07-20 NOTE — TELEPHONE ENCOUNTER
Submitted PA for OXYCODONE  Via [facility]: [id number] STATUS: \"Available without authorization\"

## 2020-08-11 NOTE — PROGRESS NOTES
[facility]) Dermatology  [facility]      Joby Garcia  1955    77 y.o. female     Date of Visit: 2020    Chief Complaint / Reason for Referral: Lesion     I was asked to see this patient by Dr. Tanner ref. provider found. History of Present Illness:  1. Over the past several months, has been experiencing recurrent non-healing lesions of scalp and R upper arm. Admits to frequently scratching the lesions.   -States that sometimes she feels pimple-type lesions on scalp that she immediately scratches. Sometimes she notices dry and mildly pruritic patches on scalp that she also scratches. Review of Systems:  Constitutional: Reports general sense of well-being. Past Medical History, Surgical History, Family History, Medications and Allergies reviewed.     Past Medical History:   Diagnosis Date    Arthritis     Chronic pain syndrome     Degeneration of lumbar or lumbosacral intervertebral disc     Depressive disorder, not elsewhere classified     DJD (degenerative joint disease) of knee     Fibromyalgia     Gastrogastric fistula 2016    GERD (gastroesophageal reflux disease)     Insomnia, unspecified     Post op infection 2013    post-operative infection from hernia repair-open wound    Reflux     Swelling      Past Surgical History:   Procedure Laterality Date    ABDOMEN SURGERY       SECTION      x 3    CHOLECYSTECTOMY      GASTRIC BYPASS SURGERY      HERNIA REPAIR  10/1/2014    EXCISION OF SINUS TRACT AND REVISION OF ABDOMINAL WOUND SCAR AND REPAIR OF VENTRAL HERNIA    HYSTERECTOMY      INCISIONAL HERNIA REPAIR  10/06/2013    open wound not healing-abdomin 10/1/2014 surgery    JOINT REPLACEMENT      left knee    JOINT REPLACEMENT Right 4/15/13    right total knee replacement       Allergies   Allergen Reactions    Codeine Shortness Of Breath    Adhesive Tape Other (See Comments)     SKIN BROKE OUT  Rash irritation paper tape okay Outpatient Medications Marked as Taking for the 8/13/20 encounter (Office Visit) with Cody Silva MD   Medication Sig Dispense Refill    morphine (MS CONTIN) 30 MG extended release tablet Take 1 tablet by mouth daily for 42 days. 42 tablet 0    oxyCODONE (ROXICODONE) 5 MG immediate release tablet Take 1 tablet by mouth every 6 hours as needed for Pain (max 3 per day) for up to 42 days. 126 tablet 0    diclofenac (VOLTAREN) 75 MG EC tablet Take one tablet po QD as needed 180 tablet 0    topiramate (TOPAMAX) 25 MG tablet TAKE ONE TABLET BY MOUTH TWICE A DAY 60 tablet 1    amitriptyline (ELAVIL) 25 MG tablet TAKE ONE TO TWO TABLETS BY MOUTH ONCE NIGHTLY 180 tablet 0    gabapentin (NEURONTIN) 600 MG tablet TAKE ONE TABLET BY MOUTH EVERY MORNING, 1 TABLET IN THE AFTERNOON, AND TAKE TWO TABLETS BY MOUTH EVERY EVENING 360 tablet 0    FLUoxetine (PROZAC) 40 MG capsule       Prenatal Vit-Fe Fumarate-FA (PNV PRENATAL PLUS MULTIVITAMIN) 27-1 MG TABS       Prenatal Vit-DSS-Fe Cbn-FA (VIRT-CHAN GT) 90-1 MG TABS       omeprazole (PRILOSEC) 40 MG capsule Take 40 mg by mouth daily.  KLOR-CON M20 20 MEQ tablet TAKE TWO TABLETS BY MOUTH TWICE A  tablet 3    VIVELLE-DOT 0.05 MG/24HR APPLY 1 PATCH TO SKIN TWICE WEEKLY 8 patch 11    furosemide (LASIX) 20 MG tablet Take 20 mg by mouth 3 times daily.  therapeutic multivitamin-minerals (THERAGRAN-M) tablet Take 1 tablet by mouth daily. Physical Examination     The following were examined and determined to be normal: Psych/Neuro, Head/face, LUE and Nails/digits. The following were examined and determined to be abnormal: Scalp/hair and RUE. -General: Well-appearing, NAD  1. Crown scalp, R posterior parietal, R posterior auricular scalp, R upper arm - several focally hemorrhagically-crusted excoriations in various stages of healing     Assessment and Plan     1. Excoriations of scalp and R upper arm.  Pt clearly has a component of neurotic excoriations, however there may also be underlying folliculitis and mild seborrheic dermatitis.   -Clindamycin soln bid prn new lesions   -Ketoconazole shampoo tiw   -Discussed attempts to reduce picking behaviors - cover open lesions w/ vaseline and bandage, trim nails, apply clindamycin soln on lesions before lesion has been manipulated

## 2020-08-13 ENCOUNTER — OFFICE VISIT (OUTPATIENT)
Dept: DERMATOLOGY | Age: 65
End: 2020-08-13
Payer: MEDICARE

## 2020-08-13 VITALS — TEMPERATURE: 97.7 F

## 2020-08-13 PROCEDURE — 99202 OFFICE O/P NEW SF 15 MIN: CPT | Performed by: DERMATOLOGY

## 2020-08-13 RX ORDER — CLINDAMYCIN PHOSPHATE 11.9 MG/ML
SOLUTION TOPICAL
Qty: 60 ML | Refills: 2 | Status: SHIPPED | OUTPATIENT
Start: 2020-08-13 | End: 2020-09-12

## 2020-08-13 RX ORDER — KETOCONAZOLE 20 MG/ML
SHAMPOO TOPICAL
Qty: 1 BOTTLE | Refills: 5 | Status: SHIPPED | OUTPATIENT
Start: 2020-08-13 | End: 2021-07-30

## 2020-08-14 ENCOUNTER — VIRTUAL VISIT (OUTPATIENT)
Dept: PAIN MANAGEMENT | Age: 65
End: 2020-08-14
Payer: MEDICARE

## 2020-08-14 PROCEDURE — 99213 OFFICE O/P EST LOW 20 MIN: CPT | Performed by: INTERNAL MEDICINE

## 2020-08-14 RX ORDER — DICLOFENAC SODIUM 75 MG/1
TABLET, DELAYED RELEASE ORAL
Qty: 90 TABLET | Refills: 0 | Status: SHIPPED | OUTPATIENT
Start: 2020-08-14 | End: 2020-09-14 | Stop reason: SDUPTHER

## 2020-08-14 RX ORDER — MORPHINE SULFATE 30 MG/1
30 TABLET, FILM COATED, EXTENDED RELEASE ORAL DAILY
Qty: 28 TABLET | Refills: 0 | Status: SHIPPED | OUTPATIENT
Start: 2020-08-14 | End: 2020-09-14 | Stop reason: SDUPTHER

## 2020-08-14 RX ORDER — OXYCODONE HYDROCHLORIDE 5 MG/1
5 TABLET ORAL EVERY 6 HOURS PRN
Qty: 84 TABLET | Refills: 0 | Status: SHIPPED | OUTPATIENT
Start: 2020-08-14 | End: 2020-09-14 | Stop reason: SDUPTHER

## 2020-08-14 NOTE — PROGRESS NOTES
TELE HEALTH VISIT (AUDIO-VISUAL)    Pursuant to the emergency declaration under the [facility], [facility] waiver authority and the [employer] and [employer] Act, this Virtual  Visit was conducted, with patient's/legal guardian's consent, to reduce the patient's risk of exposure to COVID-19 and provide continuity of care for an established patient. Service is  provided through a video synchronous discussion virtually to substitute for in-person clinic visit. Due to this being a TeleHealth encounter ([facility]), evaluation of the following organ systems was limited: Vitals/Constitutional/EENT/Resp/CV/GI//MS/Neuro/Skin/Csee-Ertz-Ppc. David Ralph  1955  <[id number]>    Ms. Arie Mckeon is being seen virtually for a follow up visit using one of the following techniques  Google Duo  Informed verbal consent to the virtual visit was obtained from Ms. Arie Mckeon. Risks associated with HIPPA compliance with the virtual visit was explained to the patient. Ms. Arie Mckeon is at her residence and Dr. Monisha Gould is in his office. HISTORY OF PRESENT ILLNESS:  Ms. Arie Mckeon is a 77 y.o. female  being assessed for a follow up visit for pain management for evaluation of ongoing care regarding her symptoms and monitoring of compliance with long term use high risk medications. She has a diagnosis of   1. Chronic pain syndrome    2. Fibromyalgia    3. Degeneration of lumbar or lumbosacral intervertebral disc    4. Primary osteoarthritis of both knees    5. Arthritis    . She complains of pain in the lower back  with radiation to the shoulders Bilateral, arms Bilateral, elbows Bilateral, hands Bilateral, buttocks, hips Bilateral, upper leg Bilateral, knees Bilateral, lower leg Bilateral and ankles Bilateral . She rates the pain 6/10 and describes it as sharp.  Current treatment regimen has helped relieve about 60% of the pain.  She denies any side effects from the current pain regimen. Patient reports that since the last follow up visit the physical functioning is unchanged, family/social relationships are unchanged, mood is unchanged sleep patterns are unchanged, and that the overall functioning is unchanged. Patient denies misusing/abusing her narcotic pain medications or using any illegal drugs. There are No indicators for possible drug abuse, addiction or diversion problems. Ms. Grecia Tucker states she has been doing fair, pain has been baseline, has good/bad days. She mentions she is using Ms Contin along with Oxycodone for BTP. Patient says she is still using Neurontin. Patient reports her weight has been stable. She states she is managing her ADL's. ALLERGIES: Patients list of allergies were reviewed     MEDICATIONS: Ms. Grecia Tucker list of medications were reviewed. Her current medications are   Outpatient Medications Prior to Visit   Medication Sig Dispense Refill    clindamycin (CLEOCIN-T) 1 % external solution Apply to new lesions bid. 60 mL 2    ketoconazole (NIZORAL) 2 % shampoo Wash scalp 3 times per week. 1 Bottle 5    morphine (MS CONTIN) 30 MG extended release tablet Take 1 tablet by mouth daily for 42 days. 42 tablet 0    oxyCODONE (ROXICODONE) 5 MG immediate release tablet Take 1 tablet by mouth every 6 hours as needed for Pain (max 3 per day) for up to 42 days. 126 tablet 0    diclofenac (VOLTAREN) 75 MG EC tablet Take one tablet po QD as needed 180 tablet 0    topiramate (TOPAMAX) 25 MG tablet TAKE ONE TABLET BY MOUTH TWICE A DAY 60 tablet 1    amitriptyline (ELAVIL) 25 MG tablet TAKE ONE TO TWO TABLETS BY MOUTH ONCE NIGHTLY 180 tablet 0    FLUoxetine (PROZAC) 40 MG capsule       Prenatal Vit-Fe Fumarate-FA (PNV PRENATAL PLUS MULTIVITAMIN) 27-1 MG TABS       Prenatal Vit-DSS-Fe Cbn-FA (VIRT-CHAN GT) 90-1 MG TABS       omeprazole (PRILOSEC) 40 MG capsule Take 40 mg by mouth daily.       KLOR-CON M20 20 MEQ tablet TAKE TWO TABLETS BY MOUTH TWICE A  tablet 3    VIVELLE-DOT 0.05 MG/24HR APPLY 1 PATCH TO SKIN TWICE WEEKLY 8 patch 11    furosemide (LASIX) 20 MG tablet Take 20 mg by mouth 3 times daily.  therapeutic multivitamin-minerals (THERAGRAN-M) tablet Take 1 tablet by mouth daily.  gabapentin (NEURONTIN) 600 MG tablet TAKE ONE TABLET BY MOUTH EVERY MORNING, 1 TABLET IN THE AFTERNOON, AND TAKE TWO TABLETS BY MOUTH EVERY EVENING 360 tablet 0     No facility-administered medications prior to visit. SOCIAL/FAMILY/PAST MEDICAL HISTORY: Ms. Harper Buenrostro, family and past medical history was reviewed. REVIEW OF SYSTEMS:    Respiratory: Negative for apnea, chest tightness and shortness of breath or change in baseline breathing. Gastrointestinal: Negative for nausea, vomiting, abdominal pain, diarrhea, constipation, blood in stool and abdominal distention. PHYSICAL EXAM:   Nursing note and vitals per patient reviewed. There were no vitals taken for this visit. Constitutional: She appears well-developed and well-nourished. No acute distress. No respiratory distress. Skin: Skin appears to be warm and dry. No rashes or any other marks noted. She is not diaphoretic. Respiratory/Pulmonary: NO conversational dyspnea, no accessory muscle use, no coughing during exam. She does not appear to be in labored breathing. Neurological/Psychiatric:She is alert and oriented to person, place, and time. Coordination is  normal.  Her mood isAppropriate and affect is Anxious. Musculoskeletal / Extremities: Range of motion is normal. Gait is normal, assistive devices use: none. IMPRESSION:   1. Chronic pain syndrome    2. Fibromyalgia    3. Degeneration of lumbar or lumbosacral intervertebral disc    4. Primary osteoarthritis of both knees    5.  Arthritis        PLAN:  Informed verbal consent regarding treatment was obtained  -continue with current opioid regimen   -continue with Ms Contin along with Oxycodone 3 per day  -She was advised weight reduction, diet changes- 800-1200 idalia diet, diet diary, exercising, nutritional  consult increased physical activity as tolerated   -walking/stretching exercises as advised    -Advised caffeine reduction, dietary changes, elevate head end of bed, NPO after supper, if using alcohol advised reduction of alcohol intake, tobacco cessation if smoking, weight loss   -will decrease Voltaren to 75 daily PRN     Current Outpatient Medications   Medication Sig Dispense Refill    clindamycin (CLEOCIN-T) 1 % external solution Apply to new lesions bid. 60 mL 2    ketoconazole (NIZORAL) 2 % shampoo Wash scalp 3 times per week. 1 Bottle 5    morphine (MS CONTIN) 30 MG extended release tablet Take 1 tablet by mouth daily for 42 days. 42 tablet 0    oxyCODONE (ROXICODONE) 5 MG immediate release tablet Take 1 tablet by mouth every 6 hours as needed for Pain (max 3 per day) for up to 42 days. 126 tablet 0    diclofenac (VOLTAREN) 75 MG EC tablet Take one tablet po QD as needed 180 tablet 0    topiramate (TOPAMAX) 25 MG tablet TAKE ONE TABLET BY MOUTH TWICE A DAY 60 tablet 1    amitriptyline (ELAVIL) 25 MG tablet TAKE ONE TO TWO TABLETS BY MOUTH ONCE NIGHTLY 180 tablet 0    FLUoxetine (PROZAC) 40 MG capsule       Prenatal Vit-Fe Fumarate-FA (PNV PRENATAL PLUS MULTIVITAMIN) 27-1 MG TABS       Prenatal Vit-DSS-Fe Cbn-FA (VIRT-CHAN GT) 90-1 MG TABS       omeprazole (PRILOSEC) 40 MG capsule Take 40 mg by mouth daily.  KLOR-CON M20 20 MEQ tablet TAKE TWO TABLETS BY MOUTH TWICE A  tablet 3    VIVELLE-DOT 0.05 MG/24HR APPLY 1 PATCH TO SKIN TWICE WEEKLY 8 patch 11    furosemide (LASIX) 20 MG tablet Take 20 mg by mouth 3 times daily.  therapeutic multivitamin-minerals (THERAGRAN-M) tablet Take 1 tablet by mouth daily.       gabapentin (NEURONTIN) 600 MG tablet TAKE ONE TABLET BY MOUTH EVERY MORNING, 1 TABLET IN THE AFTERNOON, AND TAKE TWO TABLETS BY MOUTH EVERY EVENING 360 tablet 0     No current facility-administered medications for this visit. I will continue her current medication regimen  which is part of the above treatment schedule. It has been helping with Ms. Harris's chronic  medical problems which for this visit include:   Diagnoses of Chronic pain syndrome, Fibromyalgia, Degeneration of lumbar or lumbosacral intervertebral disc, Primary osteoarthritis of both knees, and Arthritis were pertinent to this visit. Risks and benefits of the medications and other alternative treatments  including no treatment were discussed with the patient. The common side effects of these medications were also explained to the patient. Informed verbal consent was obtained. Goals of current treatment regimen include improvement in pain, restoration of functioning- with focus on improvement in physical performance, general activity, work or disability,emotional distress, health care utilization and  decreased medication consumption. Will continue to monitor progress towards achieving/maintaining therapeutic goals with special emphasis on  1. Improvement in perceived interfernce  of pain with ADL's. Ability to do home exercises independently. Ability to do household chores indoor and/or outdoor work and social and leisure activities. Improve psychosocial and physical functioning. - she is showing progression towards this treatment goal with the current regimen. She was advised against drinking alcohol with the narcotic pain medicines, advised against driving or handling machinery while adjusting the dose of medicines or if having cognitive  issues related to the current medications. Risk of overdose and death, if medicines not taken as prescribed, were also discussed. If the patient develops new symptoms or if the symptoms worsen, the patient should call the office.     While transcribing every attempt was made to maintain the accuracy of the note in terms of it's contents,there may have been some errors made inadvertently. Thank you for allowing me to participate in the care of this patient.     Sol Hernandez MD.    Cc: Abdulaziz Nino MD

## 2020-09-14 ENCOUNTER — VIRTUAL VISIT (OUTPATIENT)
Dept: PAIN MANAGEMENT | Age: 65
End: 2020-09-14
Payer: MEDICARE

## 2020-09-14 PROCEDURE — 99213 OFFICE O/P EST LOW 20 MIN: CPT | Performed by: INTERNAL MEDICINE

## 2020-09-14 RX ORDER — AMITRIPTYLINE HYDROCHLORIDE 25 MG/1
TABLET, FILM COATED ORAL
Qty: 180 TABLET | Refills: 0 | Status: SHIPPED | OUTPATIENT
Start: 2020-09-14 | End: 2020-11-09 | Stop reason: SDUPTHER

## 2020-09-14 RX ORDER — GABAPENTIN 600 MG/1
TABLET ORAL
Qty: 360 TABLET | Refills: 0 | Status: SHIPPED | OUTPATIENT
Start: 2020-09-14 | End: 2020-10-12 | Stop reason: SDUPTHER

## 2020-09-14 RX ORDER — TOPIRAMATE 25 MG/1
TABLET ORAL
Qty: 60 TABLET | Refills: 1 | Status: SHIPPED | OUTPATIENT
Start: 2020-09-14 | End: 2020-10-12 | Stop reason: SDUPTHER

## 2020-09-14 RX ORDER — OXYCODONE HYDROCHLORIDE 5 MG/1
5 TABLET ORAL EVERY 6 HOURS PRN
Qty: 84 TABLET | Refills: 0 | Status: SHIPPED | OUTPATIENT
Start: 2020-09-14 | End: 2020-10-12 | Stop reason: SDUPTHER

## 2020-09-14 RX ORDER — DICLOFENAC SODIUM 75 MG/1
TABLET, DELAYED RELEASE ORAL
Qty: 90 TABLET | Refills: 0 | Status: SHIPPED | OUTPATIENT
Start: 2020-09-14 | End: 2020-11-09 | Stop reason: SDUPTHER

## 2020-09-14 RX ORDER — MORPHINE SULFATE 30 MG/1
30 TABLET, FILM COATED, EXTENDED RELEASE ORAL DAILY
Qty: 28 TABLET | Refills: 0 | Status: SHIPPED | OUTPATIENT
Start: 2020-09-14 | End: 2020-10-12 | Stop reason: SDUPTHER

## 2020-09-14 NOTE — PROGRESS NOTES
TELE HEALTH VISIT (AUDIO-VISUAL)    Pursuant to the emergency declaration under the [facility], [facility] waiver authority and the [employer] Act, this Virtual  Visit was conducted, with patient's/legal guardian's consent, to reduce the patient's risk of exposure to COVID-19 and provide continuity of care for an established patient. Service is  provided through a video synchronous discussion virtually to substitute for in-person clinic visit. Due to this being a TeleHealth encounter (During [facility]-48 public health emergency), evaluation of the following organ systems was limited: Vitals/Constitutional/EENT/Resp/CV/GI//MS/Neuro/Skin/Kfmz-Qdxp-Xgk. Verneita Pelt  1955  <[id number]>    Ms. Dc Gongora is being seen virtually for a follow up visit using one of the following techniques  Google Duo  Informed verbal consent to the virtual visit was obtained from Ms. Dc Gongora. Risks associated with HIPPA compliance with the virtual visit was explained to the patient. Ms. Dc Gongora is at her residence and Dr. Kalpana Paul is in his office. HISTORY OF PRESENT ILLNESS:  Ms. Dc Gongora is a 77 y.o. female  being assessed for a follow up visit for pain management for evaluation of ongoing care regarding her symptoms and monitoring of compliance with long term use high risk medications. She has a diagnosis of   1. Chronic pain syndrome    2. Fibromyalgia    3. Degeneration of lumbar or lumbosacral intervertebral disc    4. Primary osteoarthritis of both knees    5. Arthritis    . She complains of pain in the bilateral shoulders, lower back  with radiation to the arms Bilateral, elbows Bilateral, hands Bilateral, buttocks, hips Bilateral, upper leg Bilateral, knees Bilateral, lower leg Bilateral and ankles Bilateral . She rates the pain 6/10 and describes it as aching.  Current treatment regimen has helped relieve about 60% of the pain.  She denies any side effects from the current pain regimen. Patient reports that since the last follow up visit the physical functioning is unchanged, family/social relationships are unchanged, mood is unchanged sleep patterns are unchanged, and that the overall functioning is unchanged. Patient denies misusing/abusing her narcotic pain medications or using any illegal drugs. There are No indicators for possible drug abuse, addiction or diversion problems. Patient reports she has been doing fair and the pian has been baseline, tolerable somewhat. She denies any constipation symptoms. She mentions she is using Oxycodone, with Ms Contin for BTP. She  States her headache symptoms are manageable . She states she is on Topamax 25 mg BID. ALLERGIES: Patients list of allergies were reviewed     MEDICATIONS: Ms. Mary Lo list of medications were reviewed. Her current medications are   Outpatient Medications Prior to Visit   Medication Sig Dispense Refill    diclofenac (VOLTAREN) 75 MG EC tablet Take one tablet po QD as needed 90 tablet 0    ketoconazole (NIZORAL) 2 % shampoo Wash scalp 3 times per week. 1 Bottle 5    topiramate (TOPAMAX) 25 MG tablet TAKE ONE TABLET BY MOUTH TWICE A DAY 60 tablet 1    amitriptyline (ELAVIL) 25 MG tablet TAKE ONE TO TWO TABLETS BY MOUTH ONCE NIGHTLY 180 tablet 0    FLUoxetine (PROZAC) 40 MG capsule       Prenatal Vit-Fe Fumarate-FA (PNV PRENATAL PLUS MULTIVITAMIN) 27-1 MG TABS       Prenatal Vit-DSS-Fe Cbn-FA (VIRT-CHAN GT) 90-1 MG TABS       omeprazole (PRILOSEC) 40 MG capsule Take 40 mg by mouth daily.  KLOR-CON M20 20 MEQ tablet TAKE TWO TABLETS BY MOUTH TWICE A  tablet 3    VIVELLE-DOT 0.05 MG/24HR APPLY 1 PATCH TO SKIN TWICE WEEKLY 8 patch 11    furosemide (LASIX) 20 MG tablet Take 20 mg by mouth 3 times daily.  therapeutic multivitamin-minerals (THERAGRAN-M) tablet Take 1 tablet by mouth daily.       gabapentin (NEURONTIN) 600 MG tablet TAKE ONE TABLET BY MOUTH EVERY MORNING, 1 TABLET IN THE AFTERNOON, AND TAKE TWO TABLETS BY MOUTH EVERY EVENING 360 tablet 0     No facility-administered medications prior to visit. SOCIAL/FAMILY/PAST MEDICAL HISTORY: Ms. Gian Tse, family and past medical history was reviewed. REVIEW OF SYSTEMS:    Respiratory: Negative for apnea, chest tightness and shortness of breath or change in baseline breathing. Gastrointestinal: Negative for nausea, vomiting, abdominal pain, diarrhea, constipation, blood in stool and abdominal distention. PHYSICAL EXAM:   Nursing note and vitals per patient reviewed. There were no vitals taken for this visit. Constitutional: She appears well-developed and well-nourished. No acute distress. No respiratory distress. Skin: Skin appears to be warm and dry. No rashes or any other marks noted. She is not diaphoretic. Respiratory/Pulmonary: NO conversational dyspnea, no accessory muscle use, no coughing during exam. She does not appear to be in labored breathing. Neurological/Psychiatric:She is alert and oriented to person, place, and time. Coordination is  normal.  Her mood isAppropriate and affect is Neutral/Euthymic(normal). Musculoskeletal / Extremities: Range of motion is normal. Gait is normal, assistive devices use: none. IMPRESSION:   1. Chronic pain syndrome    2. Fibromyalgia    3. Degeneration of lumbar or lumbosacral intervertebral disc    4. Primary osteoarthritis of both knees    5. Arthritis        PLAN:  Informed verbal consent regarding treatment was obtained  -[place] record was obtained and reviewed  for the last one year and no indicators of drug misuse  were found. Any other controlled substance prescriptions  seen on the record have been accounted for, I am aware of the patient receiving these medications. Leland Parkinson  [place] record will be rechecked as part of office protocol.    -Continue with current opioid regimen Ms Contin with Oxycodone for BTP 3 per day treatment were discussed with the patient. The common side effects of these medications were also explained to the patient. Informed verbal consent was obtained. Goals of current treatment regimen include improvement in pain, restoration of functioning- with focus on improvement in physical performance, general activity, work or disability,emotional distress, health care utilization and  decreased medication consumption. Will continue to monitor progress towards achieving/maintaining therapeutic goals with special emphasis on  1. Improvement in perceived interfernce  of pain with ADL's. Ability to do home exercises independently. Ability to do household chores indoor and/or outdoor work and social and leisure activities. Improve psychosocial and physical functioning. - she is showing progression towards this treatment goal with the current regimen. She was advised against drinking alcohol with the narcotic pain medicines, advised against driving or handling machinery while adjusting the dose of medicines or if having cognitive  issues related to the current medications. Risk of overdose and death, if medicines not taken as prescribed, were also discussed. If the patient develops new symptoms or if the symptoms worsen, the patient should call the office. While transcribing every attempt was made to maintain the accuracy of the note in terms of it's contents,there may have been some errors made inadvertently. Thank you for allowing me to participate in the care of this patient.     Fadi Mendez MD.    Cc: Declan Inman MD

## 2020-09-16 ENCOUNTER — TELEPHONE (OUTPATIENT)
Dept: PAIN MANAGEMENT | Age: 65
End: 2020-09-16

## 2020-09-16 NOTE — TELEPHONE ENCOUNTER
Submitted PA for [facility].  Via CMM Key: [id number] STATUS: APPROVED. The patient was notified by phone; KELVIN SULLIVAN.

## 2020-10-12 ENCOUNTER — OFFICE VISIT (OUTPATIENT)
Dept: PAIN MANAGEMENT | Age: 65
End: 2020-10-12
Payer: MEDICARE

## 2020-10-12 VITALS
BODY MASS INDEX: 40.21 KG/M2 | DIASTOLIC BLOOD PRESSURE: 69 MMHG | SYSTOLIC BLOOD PRESSURE: 138 MMHG | OXYGEN SATURATION: 98 % | TEMPERATURE: 97.3 F | WEIGHT: 227 LBS | HEART RATE: 84 BPM

## 2020-10-12 PROCEDURE — 99213 OFFICE O/P EST LOW 20 MIN: CPT | Performed by: INTERNAL MEDICINE

## 2020-10-12 RX ORDER — TOPIRAMATE 25 MG/1
TABLET ORAL
Qty: 60 TABLET | Refills: 1 | Status: SHIPPED | OUTPATIENT
Start: 2020-10-12 | End: 2020-11-09 | Stop reason: SDUPTHER

## 2020-10-12 RX ORDER — GABAPENTIN 600 MG/1
TABLET ORAL
Qty: 360 TABLET | Refills: 0 | Status: SHIPPED | OUTPATIENT
Start: 2020-10-12 | End: 2020-12-07 | Stop reason: SDUPTHER

## 2020-10-12 RX ORDER — MORPHINE SULFATE 30 MG/1
30 TABLET, FILM COATED, EXTENDED RELEASE ORAL DAILY
Qty: 28 TABLET | Refills: 0 | Status: SHIPPED | OUTPATIENT
Start: 2020-10-12 | End: 2020-11-09 | Stop reason: SDUPTHER

## 2020-10-12 RX ORDER — OXYCODONE HYDROCHLORIDE 5 MG/1
5 TABLET ORAL EVERY 6 HOURS PRN
Qty: 84 TABLET | Refills: 0 | Status: SHIPPED | OUTPATIENT
Start: 2020-10-12 | End: 2020-11-09 | Stop reason: SDUPTHER

## 2020-10-12 NOTE — PROGRESS NOTES
Adela Huertas  1955  <[id number]>      HISTORY OF PRESENT ILLNESS:  Ms. Raymundo Carlton is a 77 y.o. female returns for a follow up visit for pain management  She has a diagnosis of   1. Chronic pain syndrome    2. Fibromyalgia    3. Degeneration of lumbar or lumbosacral intervertebral disc    4. Primary osteoarthritis of both knees    5. Arthritis    6. Chronic migraine without aura without status migrainosus, not intractable    7. Recurrent major depressive disorder, in partial remission ([facility] [address]    8. Primary insomnia    9. Gastroesophageal reflux disease without esophagitis    . She complains of pain in the Neck and low back with radiation to bialateral arms and legs  She rates the pain 6/10 and describes it as sharp, aching, burning. Current treatment regimen has helped relieve about 60% of the pain. She denies any side effects from the current pain regimen. Patient reports that since the last follow up visit the physical functioning is unchanged, family/social relationships are unchanged, mood is unchanged sleep patterns are unchanged, and that the overall functioning is unchanged. Patient denies misusing/abusing her narcotic pain medications or using any illegal drugs. There are No indicators for possible drug abuse, addiction or diversion problems. Patient states she has been doing fair. She says she has been at home mostly. She mentions she has bene using MS Contin with Oxycodone for btp. She denies any constipation symptoms. She reports her weight has been stable. She mentions her headache symptoms have been manageable. ALLERGIES: Patients list of allergies were reviewed     MEDICATIONS: Ms. Raymundo Carlton list of medications were reviewed. Her current medications are   Outpatient Medications Prior to Visit   Medication Sig Dispense Refill    gabapentin (NEURONTIN) 600 MG tablet TAKE ONE TABLET BY MOUTH EVERY MORNING, 1 TABLET IN THE AFTERNOON, AND TAKE TWO TABLETS BY MOUTH EVERY EVENING 360 tablet 0 not diaphoretic. Cardiovascular: Normal rate, regular rhythm, normal heart sounds, and does not have murmur. Pulmonary/Chest: Effort normal. No respiratory distress. She does not have wheezes in the lung fields. She has no rales. Neurological/Psychiatric:She is alert and oriented to person, place, and time. Coordination is  normal.  Her mood isAppropriate and affect is Neutral/Euthymic(normal) . IMPRESSION:   1. Chronic pain syndrome    2. Fibromyalgia    3. Degeneration of lumbar or lumbosacral intervertebral disc    4. Primary osteoarthritis of both knees    5. Arthritis        PLAN:  Informed verbal consent was obtained  -Urine drug screen with GC/MS for opiates and drugs of abuse was ordered and will follow up on results.   -Continue with Ms Contin with Oxycodone 3 per day   -She was advised weight reduction, diet changes- 800-1200 idalia diet, diet diary, exercising, nutritional  consult increased physical activity as tolerated   -She was advised to increase fluids ( 5-7  glasses of fluid daily), limit caffeine, avoid cheese products, increase dietary fiber, increase activity and exercise as tolerated and relax regularly and enjoy meals   -Walking/Stretching exercises as advised    Current Outpatient Medications   Medication Sig Dispense Refill    gabapentin (NEURONTIN) 600 MG tablet TAKE ONE TABLET BY MOUTH EVERY MORNING, 1 TABLET IN THE AFTERNOON, AND TAKE TWO TABLETS BY MOUTH EVERY EVENING 360 tablet 0    amitriptyline (ELAVIL) 25 MG tablet TAKE ONE TO TWO TABLETS BY MOUTH ONCE NIGHTLY 180 tablet 0    diclofenac (VOLTAREN) 75 MG EC tablet Take one tablet po daily as needed 90 tablet 0    topiramate (TOPAMAX) 25 MG tablet TAKE ONE TABLET BY MOUTH TWICE A DAY 60 tablet 1    morphine (MS CONTIN) 30 MG extended release tablet Take 1 tablet by mouth daily for 28 days.  28 tablet 0    oxyCODONE (ROXICODONE) 5 MG immediate release tablet Take 1 tablet by mouth every 6 hours as needed for Pain (max 3 per day) for up to 28 days. 84 tablet 0    ketoconazole (NIZORAL) 2 % shampoo Wash scalp 3 times per week. 1 Bottle 5    FLUoxetine (PROZAC) 40 MG capsule       Prenatal Vit-Fe Fumarate-FA (PNV PRENATAL PLUS MULTIVITAMIN) 27-1 MG TABS       Prenatal Vit-DSS-Fe Cbn-FA (VIRT-CHAN GT) 90-1 MG TABS       omeprazole (PRILOSEC) 40 MG capsule Take 40 mg by mouth daily.  KLOR-CON M20 20 MEQ tablet TAKE TWO TABLETS BY MOUTH TWICE A  tablet 3    VIVELLE-DOT 0.05 MG/24HR APPLY 1 PATCH TO SKIN TWICE WEEKLY 8 patch 11    furosemide (LASIX) 20 MG tablet Take 20 mg by mouth 3 times daily.  therapeutic multivitamin-minerals (THERAGRAN-M) tablet Take 1 tablet by mouth daily. No current facility-administered medications for this visit. I will continue her current medication regimen  which is part of the above treatment schedule. It has been helping with Ms. Harris's chronic  medical problems which for this visit include:   Diagnoses of Chronic pain syndrome, Fibromyalgia, Degeneration of lumbar or lumbosacral intervertebral disc, Primary osteoarthritis of both knees, Arthritis, Chronic migraine without aura without status migrainosus, not intractable, Recurrent major depressive disorder, in partial remission ([address] Primary insomnia, and Gastroesophageal reflux disease without esophagitis were pertinent to this visit. Risks and benefits of the medications and other alternative treatments  including no treatment were discussed with the patient. The common side effects of these medications were also explained to the patient. Informed verbal consent was obtained. Goals of current treatment regimen include improvement in pain, restoration of functioning- with focus on improvement in physical performance, general activity, work or disability,emotional distress, health care utilization and  decreased medication consumption.  Will continue to monitor progress towards achieving/maintaining therapeutic goals with special emphasis on  1. Improvement in perceived interfernce  of pain with ADL's. Ability to do home exercises independently. Ability to do household chores indoor and/or outdoor work and social and leisure activities. Improve psychosocial and physical functioning. - she is showing progression towards this treatment goal with the current regimen. She was advised against drinking alcohol with the narcotic pain medicines, advised against driving or handling machinery while adjusting the dose of medicines or if having cognitive  issues related to the current medications. Risk of overdose and death, if medicines not taken as prescribed, were also discussed. If the patient develops new symptoms or if the symptoms worsen, the patient should call the office. While transcribing every attempt was made to maintain the accuracy of the note in terms of it's contents,there may have been some errors made inadvertently. Thank you for allowing me to participate in the care of this patient.     Reyna Varela MD.    Cc: Julian Velazco MD

## 2020-10-23 ENCOUNTER — HOSPITAL ENCOUNTER (OUTPATIENT)
Dept: MAMMOGRAPHY | Age: 65
Discharge: HOME | End: 2020-10-23
Payer: MEDICARE

## 2020-10-23 PROCEDURE — 77067 SCR MAMMO BI INCL CAD: CPT

## 2020-10-24 ENCOUNTER — TELEPHONE (OUTPATIENT)
Dept: MAMMOGRAPHY | Age: 65
End: 2020-10-24

## 2020-11-09 ENCOUNTER — VIRTUAL VISIT (OUTPATIENT)
Dept: PAIN MANAGEMENT | Age: 65
End: 2020-11-09
Payer: MEDICARE

## 2020-11-09 PROCEDURE — 99213 OFFICE O/P EST LOW 20 MIN: CPT | Performed by: INTERNAL MEDICINE

## 2020-11-09 RX ORDER — OXYCODONE HYDROCHLORIDE 5 MG/1
5 TABLET ORAL EVERY 6 HOURS PRN
Qty: 84 TABLET | Refills: 0 | Status: SHIPPED | OUTPATIENT
Start: 2020-11-09 | End: 2020-12-07 | Stop reason: SDUPTHER

## 2020-11-09 RX ORDER — AMITRIPTYLINE HYDROCHLORIDE 25 MG/1
TABLET, FILM COATED ORAL
Qty: 180 TABLET | Refills: 0 | Status: SHIPPED | OUTPATIENT
Start: 2020-11-09 | End: 2021-04-27 | Stop reason: SDUPTHER

## 2020-11-09 RX ORDER — DICLOFENAC SODIUM 75 MG/1
TABLET, DELAYED RELEASE ORAL
Qty: 90 TABLET | Refills: 0 | Status: SHIPPED | OUTPATIENT
Start: 2020-11-09 | End: 2021-02-02 | Stop reason: SDUPTHER

## 2020-11-09 RX ORDER — MORPHINE SULFATE 30 MG/1
30 TABLET, FILM COATED, EXTENDED RELEASE ORAL DAILY
Qty: 28 TABLET | Refills: 0 | Status: SHIPPED | OUTPATIENT
Start: 2020-11-09 | End: 2020-12-07 | Stop reason: SDUPTHER

## 2020-11-09 RX ORDER — TOPIRAMATE 25 MG/1
TABLET ORAL
Qty: 60 TABLET | Refills: 1 | Status: SHIPPED | OUTPATIENT
Start: 2020-11-09 | End: 2020-12-07 | Stop reason: SDUPTHER

## 2020-11-09 NOTE — PROGRESS NOTES
TELE HEALTH VISIT (AUDIO-VISUAL)    Pursuant to the emergency declaration under the [facility], [facility] waiver authority and the [employer] Act, this Virtual  Visit was conducted, with patient's/legal guardian's consent, to reduce the patient's risk of exposure to COVID-19 and provide continuity of care for an established patient. Service is  provided through a video synchronous discussion virtually to substitute for in-person clinic visit. Due to this being a TeleHealth encounter (During [facility]-68 public health emergency), evaluation of the following organ systems was limited: Vitals/Constitutional/EENT/Resp/CV/GI//MS/Neuro/Skin/Xatv-Uyga-Sba. Berhane Guzman  1955  <[id number]>    Ms. Annabel Ceja is being seen virtually for a follow up visit using one of the following techniques  Google Duo  Informed verbal consent to the virtual visit was obtained from Ms. Annabel Ceja. Risks associated with HIPPA compliance with the virtual visit was explained to the patient. Ms. Annabel Ceja is at her residence and Dr. Ramya Maguire is in his office. HISTORY OF PRESENT ILLNESS:  Ms. Annabel Ceja is a 77 y.o. female  being assessed for a follow up visit for pain management for evaluation of ongoing care regarding her symptoms and monitoring of compliance with long term use high risk medications. She has a diagnosis of   1. Chronic pain syndrome    2. Fibromyalgia    3. Degeneration of lumbar or lumbosacral intervertebral disc    4. Chronic migraine without aura without status migrainosus, not intractable    5. Primary osteoarthritis of both knees    6. Arthritis    . She complains of pain in the generalize pain all over  with radiation to the limbs . She rates the pain 6/10 and describes it as sharp. Current treatment regimen has helped relieve about 60% of the pain. She denies any side effects from the current pain regimen.  Patient reports that since the last follow up visit the physical functioning is unchanged, family/social relationships are unchanged, mood is unchanged sleep patterns are unchanged, and that the overall functioning is unchanged. Patient denies misusing/abusing her narcotic pain medications or using any illegal drugs. There are No indicators for possible drug abuse, addiction or diversion problems. Ms. Tracie White states she has been doing fair, pain has been manageable. She says her whole body is hurting. Patient reports her back hurts worse than her legs. She states she is using Neurontin along with Voltaren. She says she is using Topamax which is helping with headaches. ALLERGIES: Patients list of allergies were reviewed     MEDICATIONS: Ms. Tracie White list of medications were reviewed. Her current medications are   Outpatient Medications Prior to Visit   Medication Sig Dispense Refill    gabapentin (NEURONTIN) 600 MG tablet TAKE ONE TABLET BY MOUTH EVERY MORNING, 1 TABLET IN THE AFTERNOON, AND TAKE TWO TABLETS BY MOUTH EVERY EVENING 360 tablet 0    topiramate (TOPAMAX) 25 MG tablet TAKE ONE TABLET BY MOUTH TWICE A DAY 60 tablet 1    morphine (MS CONTIN) 30 MG extended release tablet Take 1 tablet by mouth daily for 28 days. 28 tablet 0    oxyCODONE (ROXICODONE) 5 MG immediate release tablet Take 1 tablet by mouth every 6 hours as needed for Pain (max 3 per day) for up to 28 days. 84 tablet 0    amitriptyline (ELAVIL) 25 MG tablet TAKE ONE TO TWO TABLETS BY MOUTH ONCE NIGHTLY 180 tablet 0    diclofenac (VOLTAREN) 75 MG EC tablet Take one tablet po daily as needed 90 tablet 0    ketoconazole (NIZORAL) 2 % shampoo Wash scalp 3 times per week. 1 Bottle 5    FLUoxetine (PROZAC) 40 MG capsule       Prenatal Vit-Fe Fumarate-FA (PNV PRENATAL PLUS MULTIVITAMIN) 27-1 MG TABS       Prenatal Vit-DSS-Fe Cbn-FA (VIRT-CHAN GT) 90-1 MG TABS       omeprazole (PRILOSEC) 40 MG capsule Take 40 mg by mouth daily.       KLOR-CON part of the above treatment schedule. It has been helping with Ms. Harris's chronic  medical problems which for this visit include:   Diagnoses of Chronic pain syndrome, Fibromyalgia, Degeneration of lumbar or lumbosacral intervertebral disc, Chronic migraine without aura without status migrainosus, not intractable, Primary osteoarthritis of both knees, and Arthritis were pertinent to this visit. Risks and benefits of the medications and other alternative treatments  including no treatment were discussed with the patient. The common side effects of these medications were also explained to the patient. Informed verbal consent was obtained. Goals of current treatment regimen include improvement in pain, restoration of functioning- with focus on improvement in physical performance, general activity, work or disability,emotional distress, health care utilization and  decreased medication consumption. Will continue to monitor progress towards achieving/maintaining therapeutic goals with special emphasis on  1. Improvement in perceived interfernce  of pain with ADL's. Ability to do home exercises independently. Ability to do household chores indoor and/or outdoor work and social and leisure activities. Improve psychosocial and physical functioning. - she is showing progression towards this treatment goal with the current regimen. She was advised against drinking alcohol with the narcotic pain medicines, advised against driving or handling machinery while adjusting the dose of medicines or if having cognitive  issues related to the current medications. Risk of overdose and death, if medicines not taken as prescribed, were also discussed. If the patient develops new symptoms or if the symptoms worsen, the patient should call the office. While transcribing every attempt was made to maintain the accuracy of the note in terms of it's contents,there may have been some errors made inadvertently.   Thank you for allowing me to participate in the care of this patient.     Georgi Roberts MD.    Cc: Jaimie Acevedo MD

## 2020-11-23 ENCOUNTER — OFFICE VISIT (OUTPATIENT)
Dept: DERMATOLOGY | Age: 65
End: 2020-11-23
Payer: MEDICARE

## 2020-11-23 VITALS — TEMPERATURE: 97.3 F

## 2020-11-23 PROCEDURE — 99213 OFFICE O/P EST LOW 20 MIN: CPT | Performed by: DERMATOLOGY

## 2020-11-23 NOTE — PROGRESS NOTES
[facility]) Dermatology  Ryan CharltoneTayogeovanywilliam      Melany Burden  1955    77 y.o. female     Date of Visit: 2020    Last Visit: 3mo     Chief Complaint: Lesion    History of Present Illness:  1. F/u for excoriations of scalp and R upper arm. Assessed as neurotic excoriations, w/ possible underlying folliculitis and mild seborrheic dermatitis. -Current regimen - Clindamycin soln (forgot to use), ketoconazole shampoo tiw   -Since last visit, pt reports mild improvement of frequency of new lesions and of clearance of existing lesions. Admits that she continues to manipulate lesions      Review of Systems:  Constitutional: Reports general sense of well-being. Allergies: Reviewed and updated. Past Medical History, Surgical History, Medications and Allergies reviewed.      Past Medical History:   Diagnosis Date    Arthritis     Chronic pain syndrome     Degeneration of lumbar or lumbosacral intervertebral disc     Depressive disorder, not elsewhere classified     DJD (degenerative joint disease) of knee     Fibromyalgia     Gastrogastric fistula 2016    GERD (gastroesophageal reflux disease)     Insomnia, unspecified     Post op infection 2013    post-operative infection from hernia repair-open wound    Reflux     Swelling      Past Surgical History:   Procedure Laterality Date    ABDOMEN SURGERY       SECTION      x 3    CHOLECYSTECTOMY      GASTRIC BYPASS SURGERY      HERNIA REPAIR  10/1/2014    EXCISION OF SINUS TRACT AND REVISION OF ABDOMINAL WOUND SCAR AND REPAIR OF VENTRAL HERNIA    HYSTERECTOMY      INCISIONAL HERNIA REPAIR  10/06/2013    open wound not healing-abdomin 10/1/2014 surgery    JOINT REPLACEMENT      left knee    JOINT REPLACEMENT Right 4/15/13    right total knee replacement       Allergies   Allergen Reactions    Codeine Shortness Of Breath    Adhesive Tape Other (See Comments)     SKIN BROKE OUT  Rash irritation paper tape okay Outpatient Medications Marked as Taking for the 11/23/20 encounter (Office Visit) with Camille Tabares MD   Medication Sig Dispense Refill    topiramate (TOPAMAX) 25 MG tablet TAKE ONE TABLET BY MOUTH TWICE A DAY 60 tablet 1    morphine (MS CONTIN) 30 MG extended release tablet Take 1 tablet by mouth daily for 28 days. 28 tablet 0    oxyCODONE (ROXICODONE) 5 MG immediate release tablet Take 1 tablet by mouth every 6 hours as needed for Pain (max 3 per day) for up to 28 days. 84 tablet 0    amitriptyline (ELAVIL) 25 MG tablet TAKE ONE TO TWO TABLETS BY MOUTH ONCE NIGHTLY 180 tablet 0    diclofenac (VOLTAREN) 75 MG EC tablet Take one tablet po daily as needed 90 tablet 0    gabapentin (NEURONTIN) 600 MG tablet TAKE ONE TABLET BY MOUTH EVERY MORNING, 1 TABLET IN THE AFTERNOON, AND TAKE TWO TABLETS BY MOUTH EVERY EVENING 360 tablet 0    ketoconazole (NIZORAL) 2 % shampoo Wash scalp 3 times per week. 1 Bottle 5    FLUoxetine (PROZAC) 40 MG capsule       Prenatal Vit-Fe Fumarate-FA (PNV PRENATAL PLUS MULTIVITAMIN) 27-1 MG TABS       Prenatal Vit-DSS-Fe Cbn-FA (VIRT-CHAN GT) 90-1 MG TABS       omeprazole (PRILOSEC) 40 MG capsule Take 40 mg by mouth daily.  KLOR-CON M20 20 MEQ tablet TAKE TWO TABLETS BY MOUTH TWICE A  tablet 3    VIVELLE-DOT 0.05 MG/24HR APPLY 1 PATCH TO SKIN TWICE WEEKLY 8 patch 11    furosemide (LASIX) 20 MG tablet Take 20 mg by mouth 3 times daily. Physical Examination     The following were examined and determined to be normal: Psych/Neuro. The following were examined and determined to be abnormal: Scalp/hair.     -General: Well-appearing, NAD  1. Crown, temporal scalp - 5 irregularly-shaped excoriations in various stages of healing     Assessment and Plan     1. Excoriations of scalp 2/2 neurotic excoriations, with component of underlying folliculitis and mild seborrheic dermatitis.  Largely unchanged.   -Clindamycin soln bid prn new lesions   -Cont ketoconazole shampoo tiw   -Discussed attempts to reduce picking behaviors - cover open lesions w/ vaseline, trim nails, apply clindamycin soln on lesions before lesion has been manipulated

## 2020-12-07 ENCOUNTER — VIRTUAL VISIT (OUTPATIENT)
Dept: PAIN MANAGEMENT | Age: 65
End: 2020-12-07
Payer: MEDICARE

## 2020-12-07 PROCEDURE — 99213 OFFICE O/P EST LOW 20 MIN: CPT | Performed by: INTERNAL MEDICINE

## 2020-12-07 RX ORDER — TOPIRAMATE 25 MG/1
TABLET ORAL
Qty: 60 TABLET | Refills: 1 | Status: SHIPPED | OUTPATIENT
Start: 2020-12-07 | End: 2021-04-27 | Stop reason: SDUPTHER

## 2020-12-07 RX ORDER — OXYCODONE HYDROCHLORIDE 5 MG/1
5 TABLET ORAL EVERY 6 HOURS PRN
Qty: 84 TABLET | Refills: 0 | Status: SHIPPED | OUTPATIENT
Start: 2020-12-07 | End: 2021-01-05 | Stop reason: SDUPTHER

## 2020-12-07 RX ORDER — MORPHINE SULFATE 30 MG/1
30 TABLET, FILM COATED, EXTENDED RELEASE ORAL DAILY
Qty: 28 TABLET | Refills: 0 | Status: SHIPPED | OUTPATIENT
Start: 2020-12-07 | End: 2021-01-05 | Stop reason: SDUPTHER

## 2020-12-07 RX ORDER — GABAPENTIN 600 MG/1
TABLET ORAL
Qty: 360 TABLET | Refills: 0 | Status: SHIPPED | OUTPATIENT
Start: 2020-12-07 | End: 2021-03-02 | Stop reason: SDUPTHER

## 2020-12-07 NOTE — PROGRESS NOTES
TELE HEALTH VISIT (AUDIO-VISUAL)    Pursuant to the emergency declaration under the [facility], [facility] waiver authority and the [employer] Act, this Virtual  Visit was conducted, with patient's/legal guardian's consent, to reduce the patient's risk of exposure to COVID-19 and provide continuity of care for an established patient. Service is  provided through a video synchronous discussion virtually to substitute for in-person clinic visit. Due to this being a TeleHealth encounter (During GGOLR-18 public health emergency), evaluation of the following organ systems was limited: Vitals/Constitutional/EENT/Resp/CV/GI//MS/Neuro/Skin/Flfm-Fuaq-Ksg. Mitchell Lamp  1955  <[id number]>    Ms. Trinidad Marie is being seen virtually for a follow up visit using one of the following techniques  Doxy. me  Informed verbal consent to the virtual visit was obtained from Ms. Trinidad Marie. Risks associated with HIPPA compliance with the virtual visit was explained to the patient. Ms. Trinidad Marie is at her residence and Dr. Lula Fry is in his office. HISTORY OF PRESENT ILLNESS:  Ms. Trinidad Marie is a 77 y.o. female  being assessed for a follow up visit for pain management for evaluation of ongoing care regarding her symptoms and monitoring of compliance with long term use high risk medications. She has a diagnosis of   1. Chronic pain syndrome    2. Fibromyalgia    3. Degeneration of lumbar or lumbosacral intervertebral disc    4. Primary osteoarthritis of both knees    5. Arthritis    6. Chronic migraine without aura without status migrainosus, not intractable    7. Recurrent major depressive disorder, in partial remission ([facility]    . She complains of pain in the lower back  with radiation to the buttocks, hips Bilateral, upper leg Bilateral, knees Bilateral and lower leg Bilateral . She rates the pain 7/10 and describes it as sharp, aching, burning. Current treatment regimen has helped relieve about 50% of the pain. She denies any side effects from the current pain regimen. Patient reports that since the last follow up visit the physical functioning is unchanged, family/social relationships are unchanged, mood is unchanged sleep patterns are unchanged, and that the overall functioning is unchanged. Patient denies misusing/abusing her narcotic pain medications or using any illegal drugs. There are No indicators for possible drug abuse, addiction or diversion problems. Ms. Lillian Roblero states she has been doing fair. Patient states she has been sick for a week, she was positive for COVID-19. She states she has been managing her ADL's and house chores. Patient says she has been compliant with her regimen. She mentions she is using Ms Contin along with Oxycodone for BTP. Patient states her sleep is fair. Has fairly normal sleep latency. Averages about 4-6 hours of sleep a night. Denies any signs of sleep apnea. Feels somewhat rested in the morning. ALLERGIES: Patients list of allergies were reviewed     MEDICATIONS: Ms. Lillian Roblero list of medications were reviewed. Her current medications are   Outpatient Medications Prior to Visit   Medication Sig Dispense Refill    topiramate (TOPAMAX) 25 MG tablet TAKE ONE TABLET BY MOUTH TWICE A DAY 60 tablet 1    morphine (MS CONTIN) 30 MG extended release tablet Take 1 tablet by mouth daily for 28 days. 28 tablet 0    oxyCODONE (ROXICODONE) 5 MG immediate release tablet Take 1 tablet by mouth every 6 hours as needed for Pain (max 3 per day) for up to 28 days.  84 tablet 0    amitriptyline (ELAVIL) 25 MG tablet TAKE ONE TO TWO TABLETS BY MOUTH ONCE NIGHTLY 180 tablet 0  diclofenac (VOLTAREN) 75 MG EC tablet Take one tablet po daily as needed 90 tablet 0    ketoconazole (NIZORAL) 2 % shampoo Wash scalp 3 times per week. 1 Bottle 5    FLUoxetine (PROZAC) 40 MG capsule       Prenatal Vit-Fe Fumarate-FA (PNV PRENATAL PLUS MULTIVITAMIN) 27-1 MG TABS       Prenatal Vit-DSS-Fe Cbn-FA (VIRT-CHAN GT) 90-1 MG TABS       omeprazole (PRILOSEC) 40 MG capsule Take 40 mg by mouth daily.  KLOR-CON M20 20 MEQ tablet TAKE TWO TABLETS BY MOUTH TWICE A  tablet 3    VIVELLE-DOT 0.05 MG/24HR APPLY 1 PATCH TO SKIN TWICE WEEKLY 8 patch 11    furosemide (LASIX) 20 MG tablet Take 20 mg by mouth 3 times daily.  therapeutic multivitamin-minerals (THERAGRAN-M) tablet Take 1 tablet by mouth daily.  gabapentin (NEURONTIN) 600 MG tablet TAKE ONE TABLET BY MOUTH EVERY MORNING, 1 TABLET IN THE AFTERNOON, AND TAKE TWO TABLETS BY MOUTH EVERY EVENING 360 tablet 0     No facility-administered medications prior to visit. SOCIAL/FAMILY/PAST MEDICAL HISTORY: Ms. Stacey Post, family and past medical history was reviewed. REVIEW OF SYSTEMS:    Respiratory: Negative for apnea, chest tightness and shortness of breath or change in baseline breathing. Gastrointestinal: Negative for nausea, vomiting, abdominal pain, diarrhea, constipation, blood in stool and abdominal distention. PHYSICAL EXAM:   Nursing note and vitals per patient reviewed. There were no vitals taken for this visit. Constitutional: She appears well-developed and well-nourished. No acute distress. No respiratory distress. Skin: Skin appears to be warm and dry. No rashes or any other marks noted. She is not diaphoretic. Respiratory/Pulmonary: NO conversational dyspnea, no accessory muscle use, no coughing during exam. She does not appear to be in labored breathing. Neurological/Psychiatric:She is alert and oriented to person, place, and time. Coordination is  normal.  Her mood isAppropriate and affect is Anxious. Musculoskeletal / Extremities: Range of motion is normal. Gait is normal, assistive devices use: none. IMPRESSION:   1. Chronic pain syndrome    2. Fibromyalgia    3. Degeneration of lumbar or lumbosacral intervertebral disc    4. Primary osteoarthritis of both knees    5. Arthritis        PLAN:  Informed verbal consent regarding treatment was obtained  -continue with current opioid regimen Ms Contin along with Oxycodone for BTP  -She was advised weight reduction, diet changes- 800-1200 idalia diet, diet diary, exercising, nutritional  consult increased physical activity as tolerated   -walking/stretching exercises as advised    -Continue with all other adjuvant medications as before     Current Outpatient Medications   Medication Sig Dispense Refill    topiramate (TOPAMAX) 25 MG tablet TAKE ONE TABLET BY MOUTH TWICE A DAY 60 tablet 1    morphine (MS CONTIN) 30 MG extended release tablet Take 1 tablet by mouth daily for 28 days. 28 tablet 0    oxyCODONE (ROXICODONE) 5 MG immediate release tablet Take 1 tablet by mouth every 6 hours as needed for Pain (max 3 per day) for up to 28 days. 84 tablet 0    amitriptyline (ELAVIL) 25 MG tablet TAKE ONE TO TWO TABLETS BY MOUTH ONCE NIGHTLY 180 tablet 0    diclofenac (VOLTAREN) 75 MG EC tablet Take one tablet po daily as needed 90 tablet 0    ketoconazole (NIZORAL) 2 % shampoo Wash scalp 3 times per week. 1 Bottle 5    FLUoxetine (PROZAC) 40 MG capsule       Prenatal Vit-Fe Fumarate-FA (PNV PRENATAL PLUS MULTIVITAMIN) 27-1 MG TABS       Prenatal Vit-DSS-Fe Cbn-FA (VIRT-CHAN GT) 90-1 MG TABS       omeprazole (PRILOSEC) 40 MG capsule Take 40 mg by mouth daily.       KLOR-CON M20 20 MEQ tablet TAKE TWO TABLETS BY MOUTH TWICE A  tablet 3    VIVELLE-DOT 0.05 MG/24HR APPLY 1 PATCH TO SKIN TWICE WEEKLY 8 patch 11  furosemide (LASIX) 20 MG tablet Take 20 mg by mouth 3 times daily.  therapeutic multivitamin-minerals (THERAGRAN-M) tablet Take 1 tablet by mouth daily.  gabapentin (NEURONTIN) 600 MG tablet TAKE ONE TABLET BY MOUTH EVERY MORNING, 1 TABLET IN THE AFTERNOON, AND TAKE TWO TABLETS BY MOUTH EVERY EVENING 360 tablet 0     No current facility-administered medications for this visit. I will continue her current medication regimen  which is part of the above treatment schedule. It has been helping with Ms. Harris's chronic  medical problems which for this visit include:   Diagnoses of Chronic pain syndrome, Fibromyalgia, Degeneration of lumbar or lumbosacral intervertebral disc, Primary osteoarthritis of both knees, Arthritis, Chronic migraine without aura without status migrainosus, not intractable, and Recurrent major depressive disorder, in partial remission ([facility] were pertinent to this visit. Risks and benefits of the medications and other alternative treatments  including no treatment were discussed with the patient. The common side effects of these medications were also explained to the patient. Informed verbal consent was obtained. Goals of current treatment regimen include improvement in pain, restoration of functioning- with focus on improvement in physical performance, general activity, work or disability,emotional distress, health care utilization and  decreased medication consumption. Will continue to monitor progress towards achieving/maintaining therapeutic goals with special emphasis on  1. Improvement in perceived interfernce  of pain with ADL's. Ability to do home exercises independently. Ability to do household chores indoor and/or outdoor work and social and leisure activities. Improve psychosocial and physical functioning. - she is showing progression towards this treatment goal with the current regimen. She was advised against drinking alcohol with the narcotic pain medicines, advised against driving or handling machinery while adjusting the dose of medicines or if having cognitive  issues related to the current medications. Risk of overdose and death, if medicines not taken as prescribed, were also discussed. If the patient develops new symptoms or if the symptoms worsen, the patient should call the office. While transcribing every attempt was made to maintain the accuracy of the note in terms of it's contents,there may have been some errors made inadvertently. Thank you for allowing me to participate in the care of this patient.     Jono Solano MD.    Cc: Manas Arizmendi MD

## 2021-01-05 ENCOUNTER — VIRTUAL VISIT (OUTPATIENT)
Dept: PAIN MANAGEMENT | Age: 66
End: 2021-01-05
Payer: MEDICARE

## 2021-01-05 DIAGNOSIS — M17.0 PRIMARY OSTEOARTHRITIS OF BOTH KNEES: ICD-10-CM

## 2021-01-05 DIAGNOSIS — M19.90 ARTHRITIS: ICD-10-CM

## 2021-01-05 DIAGNOSIS — M51.37 DEGENERATION OF LUMBAR OR LUMBOSACRAL INTERVERTEBRAL DISC: ICD-10-CM

## 2021-01-05 DIAGNOSIS — G89.4 CHRONIC PAIN SYNDROME: ICD-10-CM

## 2021-01-05 DIAGNOSIS — M79.7 FIBROMYALGIA: ICD-10-CM

## 2021-01-05 PROCEDURE — 99213 OFFICE O/P EST LOW 20 MIN: CPT | Performed by: INTERNAL MEDICINE

## 2021-01-05 RX ORDER — MORPHINE SULFATE 30 MG/1
30 TABLET, FILM COATED, EXTENDED RELEASE ORAL DAILY
Qty: 28 TABLET | Refills: 0 | Status: SHIPPED | OUTPATIENT
Start: 2021-01-05 | End: 2021-02-02 | Stop reason: SDUPTHER

## 2021-01-05 RX ORDER — OXYCODONE HYDROCHLORIDE 5 MG/1
5 TABLET ORAL EVERY 6 HOURS PRN
Qty: 84 TABLET | Refills: 0 | Status: SHIPPED | OUTPATIENT
Start: 2021-01-05 | End: 2021-02-02 | Stop reason: SDUPTHER

## 2021-01-05 NOTE — PROGRESS NOTES
TELE HEALTH VISIT (AUDIO-VISUAL)    Pursuant to the emergency declaration under the [facility] waiver authority and the [employer] Act, this Virtual  Visit was conducted, with patient's/legal guardian's consent, to reduce the patient's risk of exposure to COVID-19 and provide continuity of care for an established patient. Service is  provided through a video synchronous discussion virtually to substitute for in-person clinic visit. Due to this being a TeleHealth encounter (During YFWMP-60 public health emergency), evaluation of the following organ systems was limited: Vitals/Constitutional/EENT/Resp/CV/GI//MS/Neuro/Skin/Fvjy-Ygyh-God. Eliana Feliciano  1955  <[id number]>    Ms. Vincent Dakins is being seen virtually for a follow up visit using one of the following techniques  Doxy. me  Informed verbal consent to the virtual visit was obtained from Ms. Vincent Dakins. Risks associated with HIPPA compliance with the virtual visit was explained to the patient. Ms. Vincent Dakins is at her residence and Dr. Dave Mcmahon is in his office. HISTORY OF PRESENT ILLNESS:  Ms. Vincent Dakins is a 77 y.o. female returns for a follow up visit for multiple medical problems. Her current presenting problems are   1. Chronic pain syndrome    2. Fibromyalgia    3. Degeneration of lumbar or lumbosacral intervertebral disc    4. Primary osteoarthritis of both knees    5. Arthritis    6. Chronic migraine without aura without status migrainosus, not intractable    . As per information/history obtained from the PADT(patient assessment and documentation tool) - She complains of pain in the arms Bilateral and lower back with radiation to the elbows Bilateral and knees Bilateral She rates the pain 6/10 and describes it as stabbing. Pain is made worse by: standing. Current treatment regimen has helped relieve about 60% of the pain. She denies side effects from the current pain regimen. Patient reports that since the last follow up visit the physical functioning is unchanged, family/social relationships are unchanged, mood is unchanged and sleep patterns are unchanged, and that the overall functioning is unchanged. Patient denies neurological bowel or bladder. Patient denies misusing/abusing her narcotic pain medications or using any illegal drugs. There are No indicators for possible drug abuse, addiction or diversion problems. Upon obtaining the medical history from Ms. Eliane Mack regarding today's office visit for her presenting problems, Ms. Eliane Mack states she has been doing fair, pain has been manageable with the medications. She mentions she is using Ms Contin along with Oxycodone for BTP. Patient denies any constipation symptoms. Patient is complaining of her back hurting worse than her legs. She reports she is managing light house chores. ALLERGIES: Patients list of allergies were reviewed     MEDICATIONS: Ms. Eliane Mack list of medications were reviewed. Her current medications are   Outpatient Medications Prior to Visit   Medication Sig Dispense Refill    topiramate (TOPAMAX) 25 MG tablet TAKE ONE TABLET BY MOUTH TWICE A DAY 60 tablet 1    gabapentin (NEURONTIN) 600 MG tablet TAKE ONE TABLET BY MOUTH EVERY MORNING, 1 TABLET IN THE AFTERNOON, AND TAKE TWO TABLETS BY MOUTH EVERY EVENING 360 tablet 0    amitriptyline (ELAVIL) 25 MG tablet TAKE ONE TO TWO TABLETS BY MOUTH ONCE NIGHTLY 180 tablet 0  diclofenac (VOLTAREN) 75 MG EC tablet Take one tablet po daily as needed 90 tablet 0    ketoconazole (NIZORAL) 2 % shampoo Wash scalp 3 times per week. 1 Bottle 5    FLUoxetine (PROZAC) 40 MG capsule       Prenatal Vit-Fe Fumarate-FA (PNV PRENATAL PLUS MULTIVITAMIN) 27-1 MG TABS       Prenatal Vit-DSS-Fe Cbn-FA (VIRT-CHAN GT) 90-1 MG TABS       omeprazole (PRILOSEC) 40 MG capsule Take 40 mg by mouth daily.  KLOR-CON M20 20 MEQ tablet TAKE TWO TABLETS BY MOUTH TWICE A  tablet 3    VIVELLE-DOT 0.05 MG/24HR APPLY 1 PATCH TO SKIN TWICE WEEKLY 8 patch 11    furosemide (LASIX) 20 MG tablet Take 20 mg by mouth 3 times daily.  therapeutic multivitamin-minerals (THERAGRAN-M) tablet Take 1 tablet by mouth daily. No facility-administered medications prior to visit. REVIEW OF SYSTEMS:    Respiratory: Negative for apnea, chest tightness and shortness of breath or change in baseline breathing. PHYSICAL EXAM:   Nursing note and vitals reviewed. There were no vitals taken for this visit. Constitutional: She appears well-developed and well-nourished. No acute distress. Cardiovascular: Normal rate, regular rhythm, normal heart sounds, and does not have murmur. Pulmonary/Chest: Effort normal. No respiratory distress. She does not have wheezes in the lung fields. She has no rales. Neurological/Psychiatric:She is alert and oriented to person, place, and time. Coordination is  normal.  Her mood isAppropriate and affect is Anxious . Her    IMPRESSION:   1. Chronic pain syndrome    2. Fibromyalgia    3. Degeneration of lumbar or lumbosacral intervertebral disc    4. Primary osteoarthritis of both knees    5.  Arthritis        PLAN:  Informed verbal consent was obtained -[place] was obtained and reviewed  for the last one year and no indicators of drug misuse  were found. Any other controlled substance prescriptions  seen on the record have been accounted for, I am aware of the patient receiving these medications. Lysbeth Carrel [place] record will be rechecked as part of office protocol.    -continue with current opioid regimen [place] 4 per day  -Adv Biofeedback, relaxation and meditation techniques. Referral to psychologist for CBT was also discussed with patient   -She was advised to increase fluids ( 5-7  glasses of fluid daily), limit caffeine, avoid cheese products, increase dietary fiber, increase activity and exercise as tolerated and relax regularly and enjoy meals   -walking/stretching exercises as advised       Current Outpatient Medications   Medication Sig Dispense Refill    morphine (MS CONTIN) 30 MG extended release tablet Take 1 tablet by mouth daily for 28 days. 28 tablet 0    oxyCODONE (ROXICODONE) 5 MG immediate release tablet Take 1 tablet by mouth every 6 hours as needed for Pain (max 3 per day) for up to 28 days. 84 tablet 0    topiramate (TOPAMAX) 25 MG tablet TAKE ONE TABLET BY MOUTH TWICE A DAY 60 tablet 1    gabapentin (NEURONTIN) 600 MG tablet TAKE ONE TABLET BY MOUTH EVERY MORNING, 1 TABLET IN THE AFTERNOON, AND TAKE TWO TABLETS BY MOUTH EVERY EVENING 360 tablet 0    amitriptyline (ELAVIL) 25 MG tablet TAKE ONE TO TWO TABLETS BY MOUTH ONCE NIGHTLY 180 tablet 0    diclofenac (VOLTAREN) 75 MG EC tablet Take one tablet po daily as needed 90 tablet 0    ketoconazole (NIZORAL) 2 % shampoo Wash scalp 3 times per week. 1 Bottle 5    FLUoxetine (PROZAC) 40 MG capsule       Prenatal Vit-Fe Fumarate-FA (PNV PRENATAL PLUS MULTIVITAMIN) 27-1 MG TABS       Prenatal Vit-DSS-Fe Cbn-FA (VIRT-CHAN GT) 90-1 MG TABS       omeprazole (PRILOSEC) 40 MG capsule Take 40 mg by mouth daily.       KLOR-CON M20 20 MEQ tablet TAKE TWO TABLETS BY MOUTH TWICE A  tablet 3  VIVELLE-DOT 0.05 MG/24HR APPLY 1 PATCH TO SKIN TWICE WEEKLY 8 patch 11    furosemide (LASIX) 20 MG tablet Take 20 mg by mouth 3 times daily.  therapeutic multivitamin-minerals (THERAGRAN-M) tablet Take 1 tablet by mouth daily. No current facility-administered medications for this visit. I will continue her current medication regimen  which is part of the above treatment schedule. It has been helping with Ms. Harris's chronic  medical problems which for this visit include:   Diagnoses of Chronic pain syndrome, Fibromyalgia, Degeneration of lumbar or lumbosacral intervertebral disc, Primary osteoarthritis of both knees, and Arthritis were pertinent to this visit. Risks and benefits of the medications and other alternative treatments  including no treatment were discussed with the patient. The common side effects of these medications were also explained to the patient. Informed verbal consent was obtained. Goals of current treatment regimen include improvement in pain, restoration of functioning- with focus on improvement in physical performance, general activity, work or disability,emotional distress, health care utilization and  decreased medication consumption. Will continue to monitor progress towards achieving/maintaining therapeutic goals with special emphasis on  1. Improvement in perceived interfernce  of pain with ADL's. Ability to do home exercises independently. Ability to do household chores indoor and/or outdoor work and social and leisure activities. Improve psychosocial and physical functioning. - she is showing progression towards this treatment goal with the current regimen. She was advised against drinking alcohol with the narcotic pain medicines, advised against driving or handling machinery while adjusting the dose of medicines or if having cognitive  issues related to the current medications. Risk of overdose and death, if medicines not taken as prescribed, were also discussed. If the patient develops new symptoms or if the symptoms worsen, the patient should call the office. While transcribing every attempt was made to maintain the accuracy of the note in terms of it's contents,there may have been some errors made inadvertently. Thank you for allowing me to participate in the care of this patient.     Lenard Rios MD.    Cc: Dane Reyes MD

## 2021-02-02 ENCOUNTER — VIRTUAL VISIT (OUTPATIENT)
Dept: PAIN MANAGEMENT | Age: 66
End: 2021-02-02
Payer: MEDICARE

## 2021-02-02 DIAGNOSIS — M79.7 FIBROMYALGIA: ICD-10-CM

## 2021-02-02 DIAGNOSIS — G89.4 CHRONIC PAIN SYNDROME: ICD-10-CM

## 2021-02-02 DIAGNOSIS — M51.37 DEGENERATION OF LUMBAR OR LUMBOSACRAL INTERVERTEBRAL DISC: ICD-10-CM

## 2021-02-02 DIAGNOSIS — M19.90 ARTHRITIS: ICD-10-CM

## 2021-02-02 DIAGNOSIS — M17.0 PRIMARY OSTEOARTHRITIS OF BOTH KNEES: ICD-10-CM

## 2021-02-02 PROCEDURE — 99213 OFFICE O/P EST LOW 20 MIN: CPT | Performed by: INTERNAL MEDICINE

## 2021-02-02 RX ORDER — DICLOFENAC SODIUM 75 MG/1
TABLET, DELAYED RELEASE ORAL
Qty: 90 TABLET | Refills: 0 | Status: SHIPPED | OUTPATIENT
Start: 2021-02-02 | End: 2021-04-27 | Stop reason: SDUPTHER

## 2021-02-02 RX ORDER — MORPHINE SULFATE 30 MG/1
30 TABLET, FILM COATED, EXTENDED RELEASE ORAL DAILY
Qty: 28 TABLET | Refills: 0 | Status: SHIPPED | OUTPATIENT
Start: 2021-02-02 | End: 2021-03-02 | Stop reason: SDUPTHER

## 2021-02-02 RX ORDER — OXYCODONE HYDROCHLORIDE 5 MG/1
5 TABLET ORAL EVERY 6 HOURS PRN
Qty: 84 TABLET | Refills: 0 | Status: SHIPPED | OUTPATIENT
Start: 2021-02-02 | End: 2021-03-02 | Stop reason: SDUPTHER

## 2021-02-02 NOTE — PROGRESS NOTES
As per information/history obtained from the PADT(patient assessment and documentation tool) - She complains of pain in the lower back and knees Bilateral with radiation to the buttocks She rates the pain 6/10 and describes it as piercing. Pain is made worse by: standing. Current treatment regimen has helped relieve about 60% of the pain. She denies side effects from the current pain regimen. Patient reports that since the last follow up visit the physical functioning is unchanged, family/social relationships are unchanged, mood is unchanged and sleep patterns are unchanged, and that the overall functioning is unchanged. Patient denies neurological bowel or bladder. Patient denies misusing/abusing her narcotic pain medications or using any illegal drugs. There are No indicators for possible drug abuse, addiction or diversion problems. Upon obtaining the medical history from Ms. Sara Hernández regarding today's office visit for her presenting problems, patient states she has been doing fair, pain has been manageable. She mentions she been complaint with her regimen. She says she has been staying active at home. She reports she is using Elavil and its helping with sleep. She states she gets headaches on and off, but doing better since being on Topamax. She mentions she has recurrence of Hiatal hernia. ALLERGIES: Patients list of allergies were reviewed     MEDICATIONS: Ms. Sara Hernández list of medications were reviewed. Her current medications are   Outpatient Medications Prior to Visit   Medication Sig Dispense Refill    morphine (MS CONTIN) 30 MG extended release tablet Take 1 tablet by mouth daily for 28 days. 28 tablet 0    oxyCODONE (ROXICODONE) 5 MG immediate release tablet Take 1 tablet by mouth every 6 hours as needed for Pain (max 3 per day) for up to 28 days.  84 tablet 0    topiramate (TOPAMAX) 25 MG tablet TAKE ONE TABLET BY MOUTH TWICE A DAY 60 tablet 1  amitriptyline (ELAVIL) 25 MG tablet TAKE ONE TO TWO TABLETS BY MOUTH ONCE NIGHTLY 180 tablet 0    diclofenac (VOLTAREN) 75 MG EC tablet Take one tablet po daily as needed 90 tablet 0    ketoconazole (NIZORAL) 2 % shampoo Wash scalp 3 times per week. 1 Bottle 5    FLUoxetine (PROZAC) 40 MG capsule       Prenatal Vit-Fe Fumarate-FA (PNV PRENATAL PLUS MULTIVITAMIN) 27-1 MG TABS       Prenatal Vit-DSS-Fe Cbn-FA (VIRT-CHAN GT) 90-1 MG TABS       omeprazole (PRILOSEC) 40 MG capsule Take 40 mg by mouth daily.  KLOR-CON M20 20 MEQ tablet TAKE TWO TABLETS BY MOUTH TWICE A  tablet 3    VIVELLE-DOT 0.05 MG/24HR APPLY 1 PATCH TO SKIN TWICE WEEKLY 8 patch 11    furosemide (LASIX) 20 MG tablet Take 20 mg by mouth 3 times daily.  therapeutic multivitamin-minerals (THERAGRAN-M) tablet Take 1 tablet by mouth daily.  gabapentin (NEURONTIN) 600 MG tablet TAKE ONE TABLET BY MOUTH EVERY MORNING, 1 TABLET IN THE AFTERNOON, AND TAKE TWO TABLETS BY MOUTH EVERY EVENING 360 tablet 0     No facility-administered medications prior to visit. REVIEW OF SYSTEMS:    Respiratory: Negative for apnea, chest tightness and shortness of breath or change in baseline breathing. PHYSICAL EXAM:   Nursing note and vitals reviewed. There were no vitals taken for this visit. Constitutional: She appears well-developed and well-nourished. No acute distress. Cardiovascular: Normal rate, regular rhythm, normal heart sounds, and does not have murmur. Pulmonary/Chest: Effort normal. No respiratory distress. She does not have wheezes in the lung fields. She has no rales. Neurological/Psychiatric:She is alert and oriented to person, place, and time. Coordination is  normal.  Her mood isAppropriate and affect is Neutral/Euthymic(normal) . IMPRESSION:   1. Chronic pain syndrome    2. Fibromyalgia    3. Degeneration of lumbar or lumbosacral intervertebral disc    4.  Primary osteoarthritis of both knees 5. Arthritis        PLAN:  Informed verbal consent was obtained  -Continue with current opioid regimen Ms Contin with Oxycodone for BTP   -Adv Biofeedback, relaxation and meditation techniques. Referral to psychologist for CBT was also discussed with patient   -She was advised weight reduction, diet changes- 800-1200 idalia diet, diet diary, exercising, nutritional  consult increased physical activity as tolerated   -ROM/Stretching exercises as advised   -Interim history revived    Current Outpatient Medications   Medication Sig Dispense Refill    morphine (MS CONTIN) 30 MG extended release tablet Take 1 tablet by mouth daily for 28 days. 28 tablet 0    oxyCODONE (ROXICODONE) 5 MG immediate release tablet Take 1 tablet by mouth every 6 hours as needed for Pain (max 3 per day) for up to 28 days. 84 tablet 0    topiramate (TOPAMAX) 25 MG tablet TAKE ONE TABLET BY MOUTH TWICE A DAY 60 tablet 1    amitriptyline (ELAVIL) 25 MG tablet TAKE ONE TO TWO TABLETS BY MOUTH ONCE NIGHTLY 180 tablet 0    diclofenac (VOLTAREN) 75 MG EC tablet Take one tablet po daily as needed 90 tablet 0    ketoconazole (NIZORAL) 2 % shampoo Wash scalp 3 times per week. 1 Bottle 5    FLUoxetine (PROZAC) 40 MG capsule       Prenatal Vit-Fe Fumarate-FA (PNV PRENATAL PLUS MULTIVITAMIN) 27-1 MG TABS       Prenatal Vit-DSS-Fe Cbn-FA (VIRT-CHAN GT) 90-1 MG TABS       omeprazole (PRILOSEC) 40 MG capsule Take 40 mg by mouth daily.  KLOR-CON M20 20 MEQ tablet TAKE TWO TABLETS BY MOUTH TWICE A  tablet 3    VIVELLE-DOT 0.05 MG/24HR APPLY 1 PATCH TO SKIN TWICE WEEKLY 8 patch 11    furosemide (LASIX) 20 MG tablet Take 20 mg by mouth 3 times daily.  therapeutic multivitamin-minerals (THERAGRAN-M) tablet Take 1 tablet by mouth daily.       gabapentin (NEURONTIN) 600 MG tablet TAKE ONE TABLET BY MOUTH EVERY MORNING, 1 TABLET IN THE AFTERNOON, AND TAKE TWO TABLETS BY MOUTH EVERY EVENING 360 tablet 0 No current facility-administered medications for this visit. I will continue her current medication regimen  which is part of the above treatment schedule. It has been helping with Ms. Harris's chronic  medical problems which for this visit include:   Diagnoses of Chronic pain syndrome, Fibromyalgia, Degeneration of lumbar or lumbosacral intervertebral disc, Primary osteoarthritis of both knees, Arthritis, and Chronic migraine without aura without status migrainosus, not intractable were pertinent to this visit. Risks and benefits of the medications and other alternative treatments  including no treatment were discussed with the patient. The common side effects of these medications were also explained to the patient. Informed verbal consent was obtained. Goals of current treatment regimen include improvement in pain, restoration of functioning- with focus on improvement in physical performance, general activity, work or disability,emotional distress, health care utilization and  decreased medication consumption. Will continue to monitor progress towards achieving/maintaining therapeutic goals with special emphasis on  1. Improvement in perceived interfernce  of pain with ADL's. Ability to do home exercises independently. Ability to do household chores indoor and/or outdoor work and social and leisure activities. Improve psychosocial and physical functioning. - she is showing progression towards this treatment goal with the current regimen. She was advised against drinking alcohol with the narcotic pain medicines, advised against driving or handling machinery while adjusting the dose of medicines or if having cognitive  issues related to the current medications. Risk of overdose and death, if medicines not taken as prescribed, were also discussed. If the patient develops new symptoms or if the symptoms worsen, the patient should call the office. While transcribing every attempt was made to maintain the accuracy of the note in terms of it's contents,there may have been some errors made inadvertently. Thank you for allowing me to participate in the care of this patient.     Katherine Mascorro MD.    Cc: Juan Perez MD

## 2021-03-02 ENCOUNTER — VIRTUAL VISIT (OUTPATIENT)
Dept: PAIN MANAGEMENT | Age: 66
End: 2021-03-02
Payer: MEDICARE

## 2021-03-02 DIAGNOSIS — F51.01 PRIMARY INSOMNIA: ICD-10-CM

## 2021-03-02 DIAGNOSIS — M17.0 PRIMARY OSTEOARTHRITIS OF BOTH KNEES: ICD-10-CM

## 2021-03-02 DIAGNOSIS — M19.90 ARTHRITIS: ICD-10-CM

## 2021-03-02 DIAGNOSIS — M51.37 DEGENERATION OF LUMBAR OR LUMBOSACRAL INTERVERTEBRAL DISC: ICD-10-CM

## 2021-03-02 DIAGNOSIS — M79.7 FIBROMYALGIA: ICD-10-CM

## 2021-03-02 DIAGNOSIS — F39 MOOD DISORDER (HCC): ICD-10-CM

## 2021-03-02 DIAGNOSIS — G43.709 CHRONIC MIGRAINE WITHOUT AURA WITHOUT STATUS MIGRAINOSUS, NOT INTRACTABLE: ICD-10-CM

## 2021-03-02 DIAGNOSIS — G89.4 CHRONIC PAIN SYNDROME: ICD-10-CM

## 2021-03-02 PROCEDURE — 99214 OFFICE O/P EST MOD 30 MIN: CPT | Performed by: INTERNAL MEDICINE

## 2021-03-02 RX ORDER — GABAPENTIN 600 MG/1
TABLET ORAL
Qty: 360 TABLET | Refills: 0 | Status: SHIPPED | OUTPATIENT
Start: 2021-03-02 | End: 2021-04-27 | Stop reason: SDUPTHER

## 2021-03-02 RX ORDER — OXYCODONE HYDROCHLORIDE 5 MG/1
5 TABLET ORAL EVERY 6 HOURS PRN
Qty: 84 TABLET | Refills: 0 | Status: SHIPPED | OUTPATIENT
Start: 2021-03-02 | End: 2021-03-30 | Stop reason: SDUPTHER

## 2021-03-02 RX ORDER — MORPHINE SULFATE 30 MG/1
30 TABLET, FILM COATED, EXTENDED RELEASE ORAL DAILY
Qty: 28 TABLET | Refills: 0 | Status: SHIPPED | OUTPATIENT
Start: 2021-03-02 | End: 2021-03-30 | Stop reason: SDUPTHER

## 2021-03-02 NOTE — PROGRESS NOTES
worse by: movement, walking, standing, sitting, bending. Current treatment regimen has helped relieve about 60% of the pain. She denies side effects from the current pain regimen. Patient reports that since the last follow up visit the physical functioning is unchanged, family/social relationships are unchanged, mood is unchanged and sleep patterns are unchanged, and that the overall functioning is unchanged. Patient denies neurological bowel or bladder. Patient denies misusing/abusing her narcotic pain medications or using any illegal drugs. There are No indicators for possible drug abuse, addiction or diversion problems. Upon obtaining the medical history from Ms. Jodi Moon regarding today's office visit for her presenting problems, Ms. Jodi Moon states she has been doing about the same. Patient states she has been compliant with the medications. She mentions she is using Ms Contin along with Oxycodone for breakthrough pain and also Voltaren. Patient is complaining of her back hurting worse than her legs. She reports she has been managing her ADL's. Patient is complaining of her neck and shoulder blade area hurting the worse. She mentions she is using Neurontin. she states that the medications are helping with the headaches  and they are tolerable. Denies nausea, vomiting, sonophobia, photophobia, photopsia, diplopia, vertigo, neck stiffness, she is on Topamax which is helping. Patient states she sleeps well. Has normal sleep latency. Averages about 5-7 hours of sleep a night. Denies any signs of sleep apnea. Feels rested in the AM. Denies any sleep attacks during the day. Medication regimen helps with maintaining/regulating sleep, she is on Elavil. Patient denies any constipation symptoms. ALLERGIES/PAST MED/FAM/SOC HISTORY: Ms. Jodi Moon allergies, past medical, family and social history were reviewed in the chart.       Ms. Jodi Moon current medications are   Outpatient Medications Prior to Visit Medication Sig Dispense Refill    morphine (MS CONTIN) 30 MG extended release tablet Take 1 tablet by mouth daily for 28 days. 28 tablet 0    oxyCODONE (ROXICODONE) 5 MG immediate release tablet Take 1 tablet by mouth every 6 hours as needed for Pain (max 3 per day) for up to 28 days. 84 tablet 0    diclofenac (VOLTAREN) 75 MG EC tablet Take one tablet po daily as needed 90 tablet 0    topiramate (TOPAMAX) 25 MG tablet TAKE ONE TABLET BY MOUTH TWICE A DAY 60 tablet 1    amitriptyline (ELAVIL) 25 MG tablet TAKE ONE TO TWO TABLETS BY MOUTH ONCE NIGHTLY 180 tablet 0    ketoconazole (NIZORAL) 2 % shampoo Wash scalp 3 times per week. 1 Bottle 5    FLUoxetine (PROZAC) 40 MG capsule       Prenatal Vit-Fe Fumarate-FA (PNV PRENATAL PLUS MULTIVITAMIN) 27-1 MG TABS       Prenatal Vit-DSS-Fe Cbn-FA (VIRT-CHAN GT) 90-1 MG TABS       omeprazole (PRILOSEC) 40 MG capsule Take 40 mg by mouth daily.  KLOR-CON M20 20 MEQ tablet TAKE TWO TABLETS BY MOUTH TWICE A  tablet 3    VIVELLE-DOT 0.05 MG/24HR APPLY 1 PATCH TO SKIN TWICE WEEKLY 8 patch 11    furosemide (LASIX) 20 MG tablet Take 20 mg by mouth 3 times daily.  therapeutic multivitamin-minerals (THERAGRAN-M) tablet Take 1 tablet by mouth daily.  gabapentin (NEURONTIN) 600 MG tablet TAKE ONE TABLET BY MOUTH EVERY MORNING, 1 TABLET IN THE AFTERNOON, AND TAKE TWO TABLETS BY MOUTH EVERY EVENING 360 tablet 0     No facility-administered medications prior to visit. REVIEW OF SYSTEMS:     Respiratory: Negative for shortness of breath. Cardiovascular: Negative for chest pain, palpitations  Gastrointestinal: Negative for blood in stool, abdominal distention, nausea, vomiting, abdominal pain, diarrhea,constipation.   Neurological: Negative for speech difficulty, weakness and light-headedness, dizziness, tremors, sleepiness  Psychiatric/Behavioral: Negative for suicidal ideas, hallucinations, behavioral problems, self-injury, decreased concentration/cognition, agitation, confusion. PHYSICAL EXAM:   Nursing note and vitals reviewed. There were no vitals taken for this visit. General Appearance: Patient is well nourished, well developed, well groomed and in no acute distress. Skin: Skin is warm and dry, good turgor . No rash or lesions noted. She is not diaphoretic. Pulmonary/Chest: Effort normal. No respiratory distress or use of accessory muscles. Auscultation revealing normal air entry. She does not have wheezes in the lung fields. She has no rales. Cardiovascular: Normal rate, regular rhythm, normal heart sounds, and does not have murmur. Exam reveals no gallop and no friction rub. Musculoskeletal / Extremities: Range of motion is normal. Gait is normal, assistive devices use: none. She exhibits edema: none, and no tenderness. Neurological/Psychiatric:She is alert and oriented to person, place, and time. Coordination is  normal.   Judgement and Insight is normal  Her mood is Appropriate and affect is Flat/blunted and Anxious . Her behavior is normal.   thought content normal.        IMPRESSION:     1. Chronic pain syndrome - STABLE: Continue current treatment plan    2. Fibromyalgia - STABLE: Continue current treatment plan   3. Degeneration of lumbar or lumbosacral intervertebral disc - STABLE: Continue current treatment plan   4. Primary osteoarthritis of both knees - STABLE: Continue current treatment plan   5. Arthritis - STABLE: Continue current treatment plan   6. Chronic migraine without aura without status migrainosus, not intractable - STABLE: Continue current treatment plan   7. Mood disorder ([facility] [address] - STABLE: Continue current treatment plan   8.  Primary insomnia - STABLE: Continue current treatment plan       PLAN:  Informed verbal consent was obtained.  -continue with the current treatment regimen   -continue with  Ms Contin along with Oxycodone 3 per day for BTP  -Most recent labs were reviewed and are within normal limits.   -she was advised proper sleep hygiene-told to avoid:use of caffeine or other stimulants after noon, alcohol use near bedtime, long or frequent naps during the day, erratic sleep schedule, heavy meals near bedtime, vigorous exercise near bedtime and use of electronic devices near bedtime. Continue with Elavil 25 mg 1-2 nightly  -she was advised  to avoid using too many OTC analgesics to control the headaches, avoid chocolates, increased caffeine, cheeses and MSG nitrite containing foods, cigarette smoking. To avoid bright lights, strong smells and skipping meals. Continue with Topamax 25 mg BID  -She was advised to increase fluids ( 5-7  glasses of fluid daily), limit caffeine, avoid cheese products, increase dietary fiber, increase activity and exercise as tolerated and relax regularly and enjoy meals   -Patient's urine drug screen results with GC/MS confirmation were obtained and reviewed and were negative for any illicit drugs. Prescribed medications were within acceptable range.   -She was advised weight reduction, diet changes- 800-1200 idalia diet, diet diary, exercising, nutritional  consult increased physical activity as tolerated   -walking/stretching exercises as advised      Ms. Rodriguez Cousin will be prescribed  the medications  listed below which are for treatment of her presenting  medical problems which for this visit include:   Diagnoses of Chronic pain syndrome, Fibromyalgia, Degeneration of lumbar or lumbosacral intervertebral disc, Primary osteoarthritis of both knees, Arthritis, Chronic migraine without aura without status migrainosus, not intractable, Mood disorder ([facility] [address] and Primary insomnia were pertinent to this visit. Medications/orders associated with this visit:    Current Outpatient Medications   Medication Sig Dispense Refill    morphine (MS CONTIN) 30 MG extended release tablet Take 1 tablet by mouth daily for 28 days.  28 tablet 0    oxyCODONE (ROXICODONE) 5 MG immediate release tablet Take 1 tablet by mouth every 6 hours as needed for Pain (max 3 per day) for up to 28 days. 84 tablet 0    diclofenac (VOLTAREN) 75 MG EC tablet Take one tablet po daily as needed 90 tablet 0    topiramate (TOPAMAX) 25 MG tablet TAKE ONE TABLET BY MOUTH TWICE A DAY 60 tablet 1    amitriptyline (ELAVIL) 25 MG tablet TAKE ONE TO TWO TABLETS BY MOUTH ONCE NIGHTLY 180 tablet 0    ketoconazole (NIZORAL) 2 % shampoo Wash scalp 3 times per week. 1 Bottle 5    FLUoxetine (PROZAC) 40 MG capsule       Prenatal Vit-Fe Fumarate-FA (PNV PRENATAL PLUS MULTIVITAMIN) 27-1 MG TABS       Prenatal Vit-DSS-Fe Cbn-FA (VIRT-CHAN GT) 90-1 MG TABS       omeprazole (PRILOSEC) 40 MG capsule Take 40 mg by mouth daily.  KLOR-CON M20 20 MEQ tablet TAKE TWO TABLETS BY MOUTH TWICE A  tablet 3    VIVELLE-DOT 0.05 MG/24HR APPLY 1 PATCH TO SKIN TWICE WEEKLY 8 patch 11    furosemide (LASIX) 20 MG tablet Take 20 mg by mouth 3 times daily.  therapeutic multivitamin-minerals (THERAGRAN-M) tablet Take 1 tablet by mouth daily.  gabapentin (NEURONTIN) 600 MG tablet TAKE ONE TABLET BY MOUTH EVERY MORNING, 1 TABLET IN THE AFTERNOON, AND TAKE TWO TABLETS BY MOUTH EVERY EVENING 360 tablet 0     No current facility-administered medications for this visit. Goals of current treatment regimen include improvement in pain, restoration of functioning- with focus on improvement in physical performance, general activity, work or disability,emotional distress, health care utilization and  decreased medication consumption. Will continue to monitor progress towards achieving/maintaining therapeutic goals with special emphasis on  1. Improvement in perceived interfernce  of pain with ADL's. Ability to do home exercises independently. Ability to do household chores indoor and/or outdoor work and social and leisure activities. To increase flexibility/ROM, strength and endurance.  Improve psychosocial and physical functioning.- she is not showing any significant progress/or showing regression  towards this goal and reassessment and adjustment of goals/treatment have been made. 2. Improving sleep to 6-7 hours a night. Improve mood/ anxiety and depression symptoms such as crying spells, low energy, problems with concentration, motivation. - she is not showing any significant progress/or showing regression  towards this goal and reassessment and adjustment of goals/treatment have been made. 3. Reduction of reliance on opioid analgesia/more appropriate opioid use. - she is showing progression towards this treatment goal with the current regimen. Risks and benefits of the medications and other alternative treatments have been/were  discussed with the patient. Any questions on the  common side effects of these medications were also answered. She was advised against drinking alcohol with the narcotic pain medicines, advised against driving or handling machinery when  starting or adjusting the dose of medicines, feeling groggy or drowsy, or if having any cognitive issues related to the current medications. Sheis fully aware of the risk of overdose and death, if medicines are misused and not taken as prescribed. If she develops new symptoms or if the symptoms worsen, she was told to call the office. .  Thank you for allowing me to participate in the care of this patient.     Fe Patel MD.    Cc: Pancho Yuen MD

## 2021-03-18 ENCOUNTER — APPOINTMENT (OUTPATIENT)
Dept: CT IMAGING | Age: 66
End: 2021-03-18
Payer: MEDICARE

## 2021-03-18 ENCOUNTER — HOSPITAL ENCOUNTER (EMERGENCY)
Age: 66
Discharge: ANOTHER HOSPITAL | End: 2021-03-18
Attending: EMERGENCY MEDICINE
Payer: MEDICARE

## 2021-03-18 ENCOUNTER — APPOINTMENT (OUTPATIENT)
Dept: GENERAL RADIOLOGY | Age: 66
End: 2021-03-18
Payer: MEDICARE

## 2021-03-18 VITALS
OXYGEN SATURATION: 95 % | RESPIRATION RATE: 22 BRPM | SYSTOLIC BLOOD PRESSURE: 104 MMHG | TEMPERATURE: 100.7 F | DIASTOLIC BLOOD PRESSURE: 74 MMHG | WEIGHT: 220 LBS | BODY MASS INDEX: 40.48 KG/M2 | HEIGHT: 62 IN | HEART RATE: 92 BPM

## 2021-03-18 DIAGNOSIS — T50.Z95A VACCINE REACTION, INITIAL ENCOUNTER: ICD-10-CM

## 2021-03-18 DIAGNOSIS — R50.9 FEVER AND CHILLS: ICD-10-CM

## 2021-03-18 DIAGNOSIS — G45.9 TIA (TRANSIENT ISCHEMIC ATTACK): Primary | ICD-10-CM

## 2021-03-18 LAB
A/G RATIO: 1.4 (ref 1.1–2.2)
ALBUMIN SERPL-MCNC: 4.5 G/DL (ref 3.4–5)
ALP BLD-CCNC: 113 U/L (ref 40–129)
ALT SERPL-CCNC: 36 U/L (ref 10–40)
ANION GAP SERPL CALCULATED.3IONS-SCNC: 11 MMOL/L (ref 3–16)
AST SERPL-CCNC: 34 U/L (ref 15–37)
BASOPHILS ABSOLUTE: 0 K/UL (ref 0–0.2)
BASOPHILS RELATIVE PERCENT: 0.3 %
BILIRUB SERPL-MCNC: 0.3 MG/DL (ref 0–1)
BILIRUBIN URINE: NEGATIVE
BLOOD, URINE: ABNORMAL
BUN BLDV-MCNC: 12 MG/DL (ref 7–20)
CALCIUM SERPL-MCNC: 9.1 MG/DL (ref 8.3–10.6)
CHLORIDE BLD-SCNC: 100 MMOL/L (ref 99–110)
CHP ED QC CHECK: NORMAL
CLARITY: CLEAR
CO2: 27 MMOL/L (ref 21–32)
COLOR: YELLOW
CREAT SERPL-MCNC: 0.8 MG/DL (ref 0.6–1.2)
EKG ATRIAL RATE: 90 BPM
EKG DIAGNOSIS: NORMAL
EKG P AXIS: 50 DEGREES
EKG P-R INTERVAL: 134 MS
EKG Q-T INTERVAL: 378 MS
EKG QRS DURATION: 98 MS
EKG QTC CALCULATION (BAZETT): 462 MS
EKG R AXIS: -7 DEGREES
EKG T AXIS: 22 DEGREES
EKG VENTRICULAR RATE: 90 BPM
EOSINOPHILS ABSOLUTE: 0 K/UL (ref 0–0.6)
EOSINOPHILS RELATIVE PERCENT: 0.1 %
EPITHELIAL CELLS, UA: NORMAL /HPF (ref 0–5)
GFR AFRICAN AMERICAN: >60
GFR NON-AFRICAN AMERICAN: >60
GLOBULIN: 3.2 G/DL
GLUCOSE BLD-MCNC: 81 MG/DL
GLUCOSE BLD-MCNC: 81 MG/DL (ref 70–99)
GLUCOSE BLD-MCNC: 88 MG/DL (ref 70–99)
GLUCOSE URINE: NEGATIVE MG/DL
HCT VFR BLD CALC: 41.6 % (ref 36–48)
HEMOGLOBIN: 13.7 G/DL (ref 12–16)
INR BLD: 0.98 (ref 0.86–1.14)
KETONES, URINE: NEGATIVE MG/DL
LEUKOCYTE ESTERASE, URINE: NEGATIVE
LYMPHOCYTES ABSOLUTE: 0.9 K/UL (ref 1–5.1)
LYMPHOCYTES RELATIVE PERCENT: 16.1 %
MAGNESIUM: 2 MG/DL (ref 1.8–2.4)
MCH RBC QN AUTO: 30.7 PG (ref 26–34)
MCHC RBC AUTO-ENTMCNC: 33 G/DL (ref 31–36)
MCV RBC AUTO: 93.2 FL (ref 80–100)
MICROSCOPIC EXAMINATION: YES
MONOCYTES ABSOLUTE: 0.4 K/UL (ref 0–1.3)
MONOCYTES RELATIVE PERCENT: 6.7 %
NEUTROPHILS ABSOLUTE: 4.3 K/UL (ref 1.7–7.7)
NEUTROPHILS RELATIVE PERCENT: 76.8 %
NITRITE, URINE: NEGATIVE
PDW BLD-RTO: 13.9 % (ref 12.4–15.4)
PERFORMED ON: NORMAL
PH UA: 5 (ref 5–8)
PLATELET # BLD: 219 K/UL (ref 135–450)
PMV BLD AUTO: 7 FL (ref 5–10.5)
POTASSIUM REFLEX MAGNESIUM: 3.5 MMOL/L (ref 3.5–5.1)
PROTEIN UA: NEGATIVE MG/DL
PROTHROMBIN TIME: 11.4 SEC (ref 10–13.2)
RBC # BLD: 4.46 M/UL (ref 4–5.2)
RBC UA: NORMAL /HPF (ref 0–4)
SODIUM BLD-SCNC: 138 MMOL/L (ref 136–145)
SPECIFIC GRAVITY UA: <=1.005 (ref 1–1.03)
TOTAL PROTEIN: 7.7 G/DL (ref 6.4–8.2)
TROPONIN: <0.01 NG/ML
URINE REFLEX TO CULTURE: ABNORMAL
URINE TYPE: ABNORMAL
UROBILINOGEN, URINE: 0.2 E.U./DL
WBC # BLD: 5.6 K/UL (ref 4–11)
WBC UA: NORMAL /HPF (ref 0–5)

## 2021-03-18 PROCEDURE — 6360000004 HC RX CONTRAST MEDICATION: Performed by: EMERGENCY MEDICINE

## 2021-03-18 PROCEDURE — 83735 ASSAY OF MAGNESIUM: CPT

## 2021-03-18 PROCEDURE — 6370000000 HC RX 637 (ALT 250 FOR IP): Performed by: EMERGENCY MEDICINE

## 2021-03-18 PROCEDURE — 70450 CT HEAD/BRAIN W/O DYE: CPT

## 2021-03-18 PROCEDURE — 81001 URINALYSIS AUTO W/SCOPE: CPT

## 2021-03-18 PROCEDURE — 70498 CT ANGIOGRAPHY NECK: CPT

## 2021-03-18 PROCEDURE — 85610 PROTHROMBIN TIME: CPT

## 2021-03-18 PROCEDURE — 2580000003 HC RX 258: Performed by: EMERGENCY MEDICINE

## 2021-03-18 PROCEDURE — 85025 COMPLETE CBC W/AUTO DIFF WBC: CPT

## 2021-03-18 PROCEDURE — 36415 COLL VENOUS BLD VENIPUNCTURE: CPT

## 2021-03-18 PROCEDURE — 99285 EMERGENCY DEPT VISIT HI MDM: CPT

## 2021-03-18 PROCEDURE — 71045 X-RAY EXAM CHEST 1 VIEW: CPT

## 2021-03-18 PROCEDURE — 93010 ELECTROCARDIOGRAM REPORT: CPT | Performed by: INTERNAL MEDICINE

## 2021-03-18 PROCEDURE — 80053 COMPREHEN METABOLIC PANEL: CPT

## 2021-03-18 PROCEDURE — 93005 ELECTROCARDIOGRAM TRACING: CPT | Performed by: EMERGENCY MEDICINE

## 2021-03-18 PROCEDURE — 84484 ASSAY OF TROPONIN QUANT: CPT

## 2021-03-18 RX ORDER — ACETAMINOPHEN 500 MG
1000 TABLET ORAL ONCE
Status: COMPLETED | OUTPATIENT
Start: 2021-03-18 | End: 2021-03-18

## 2021-03-18 RX ORDER — 0.9 % SODIUM CHLORIDE 0.9 %
1000 INTRAVENOUS SOLUTION INTRAVENOUS ONCE
Status: COMPLETED | OUTPATIENT
Start: 2021-03-18 | End: 2021-03-18

## 2021-03-18 RX ORDER — ASPIRIN 325 MG
325 TABLET ORAL ONCE
Status: COMPLETED | OUTPATIENT
Start: 2021-03-18 | End: 2021-03-18

## 2021-03-18 RX ADMIN — ACETAMINOPHEN 1000 MG: 500 TABLET ORAL at 17:56

## 2021-03-18 RX ADMIN — ASPIRIN 325 MG: 325 TABLET, FILM COATED ORAL at 15:56

## 2021-03-18 RX ADMIN — IOPAMIDOL 75 ML: 755 INJECTION, SOLUTION INTRAVENOUS at 14:45

## 2021-03-18 RX ADMIN — SODIUM CHLORIDE 1000 ML: 9 INJECTION, SOLUTION INTRAVENOUS at 15:12

## 2021-03-18 ASSESSMENT — PAIN DESCRIPTION - LOCATION
LOCATION: HEAD
LOCATION: HEAD

## 2021-03-18 ASSESSMENT — PAIN DESCRIPTION - PAIN TYPE
TYPE: ACUTE PAIN
TYPE: ACUTE PAIN

## 2021-03-18 NOTE — ED PROVIDER NOTES
elsewhere classified     DJD (degenerative joint disease) of knee     Fibromyalgia     Gastrogastric fistula 2016    GERD (gastroesophageal reflux disease)     Insomnia, unspecified     Post op infection 2013    post-operative infection from hernia repair-open wound    Reflux     Swelling      Past Surgical History:   Procedure Laterality Date    ABDOMEN SURGERY       SECTION      x 3    CHOLECYSTECTOMY      GASTRIC BYPASS SURGERY      HERNIA REPAIR  10/1/2014    EXCISION OF SINUS TRACT AND REVISION OF ABDOMINAL WOUND SCAR AND REPAIR OF VENTRAL HERNIA    HYSTERECTOMY      INCISIONAL HERNIA REPAIR  10/06/2013    open wound not healing-abdomin 10/1/2014 surgery    JOINT REPLACEMENT      left knee    JOINT REPLACEMENT Right 4/15/13    right total knee replacement     Family History   Problem Relation Age of Onset    High Blood Pressure Sister     Diabetes Mother     Cancer Mother     Cancer Father     Osteoarthritis Other     Heart Disease Other     Asthma Other     Cancer Other     Diabetes Other     Hypertension Other      Social History     Socioeconomic History    Marital status:      Spouse name: Not on file    Number of children: 3    Years of education: Not on file    Highest education level: Not on file   Occupational History    Not on file   Social Needs    Financial resource strain: Not on file    Food insecurity     Worry: Not on file     Inability: Not on file   [employer] needs     Medical: Not on file     Non-medical: Not on file   Tobacco Use    Smoking status: Never Smoker    Smokeless tobacco: Never Used   Substance and Sexual Activity    Alcohol use: No     Alcohol/week: 0.0 standard drinks    Drug use: No    Sexual activity: Yes     Partners: Male   Lifestyle    Physical activity     Days per week: Not on file     Minutes per session: Not on file    Stress: Not on file   Relationships    Social connections     Talks on phone: Not on file     Gets together: Not on file     Attends Zoroastrian service: Not on file     Active member of club or organization: Not on file     Attends meetings of clubs or organizations: Not on file     Relationship status: Not on file    Intimate partner violence     Fear of current or ex partner: Not on file     Emotionally abused: Not on file     Physically abused: Not on file     Forced sexual activity: Not on file   Other Topics Concern    Not on file   Social History Narrative    Not on file     No current facility-administered medications for this encounter. Current Outpatient Medications   Medication Sig Dispense Refill    morphine (MS CONTIN) 30 MG extended release tablet Take 1 tablet by mouth daily for 28 days. 28 tablet 0    oxyCODONE (ROXICODONE) 5 MG immediate release tablet Take 1 tablet by mouth every 6 hours as needed for Pain (max 3 per day) for up to 28 days. 84 tablet 0    gabapentin (NEURONTIN) 600 MG tablet TAKE ONE TABLET BY MOUTH EVERY MORNING, 1 TABLET IN THE AFTERNOON, AND TAKE TWO TABLETS BY MOUTH EVERY EVENING 360 tablet 0    diclofenac (VOLTAREN) 75 MG EC tablet Take one tablet po daily as needed 90 tablet 0    topiramate (TOPAMAX) 25 MG tablet TAKE ONE TABLET BY MOUTH TWICE A DAY 60 tablet 1    amitriptyline (ELAVIL) 25 MG tablet TAKE ONE TO TWO TABLETS BY MOUTH ONCE NIGHTLY 180 tablet 0    ketoconazole (NIZORAL) 2 % shampoo Wash scalp 3 times per week. 1 Bottle 5    FLUoxetine (PROZAC) 40 MG capsule       Prenatal Vit-Fe Fumarate-FA (PNV PRENATAL PLUS MULTIVITAMIN) 27-1 MG TABS       Prenatal Vit-DSS-Fe Cbn-FA (VIRT-CHAN GT) 90-1 MG TABS       omeprazole (PRILOSEC) 40 MG capsule Take 40 mg by mouth daily.  KLOR-CON M20 20 MEQ tablet TAKE TWO TABLETS BY MOUTH TWICE A  tablet 3    VIVELLE-DOT 0.05 MG/24HR APPLY 1 PATCH TO SKIN TWICE WEEKLY 8 patch 11    furosemide (LASIX) 20 MG tablet Take 20 mg by mouth 3 times daily.       therapeutic multivitamin-minerals (THERAGRAN-M) tablet Take 1 tablet by mouth daily. Allergies   Allergen Reactions    Codeine Shortness Of Breath    Adhesive Tape Other (See Comments)     SKIN BROKE OUT  Rash irritation paper tape okay       REVIEW OF SYSTEMS  10 systems reviewed, pertinent positives per HPI otherwise noted to be negative. PHYSICAL EXAM  /74   Pulse 92   Temp 100.7 °F (38.2 °C)   Resp 22   Ht 5' 2\" (1.575 m)   Wt 220 lb (99.8 kg)   SpO2 95%   BMI 40.24 kg/m²   GENERAL APPEARANCE: Awake and alert. Cooperative. no distress. HEAD: Normocephalic. Atraumatic. EYES: PERRL. EOM's grossly intact. ENT: Mucous membranes are dry. NECK: Supple. No meningeal signs. Normal ROM   HEART: RRR. No murmurs. LUNGS: Respirations unlabored. CTAB. Good air exchange. Speaking comfortably in full sentences. ABDOMEN: Soft. Non-distended. Non-tender. No masses. No organomegaly. No guarding or rebound. EXTREMITIES: No peripheral edema. Moves all extremities equally. All extremities neurovascularly intact. SKIN: Warm and dry. No acute rashes. NEUROLOGICAL: Alert and oriented. Mild tremor. Please see NIH Stroke Scale below. PSYCHIATRIC: Normal mood and affect. NIH Stroke Scale     Interval: Baseline  Time: 1413  Person Administering Scale: DO Brain   Administer stroke scale items in the order listed. Record performance in each category after each subscale exam. Do not go back and change scores. Follow directions provided for each exam technique. Scores should reflect what the patient does, not what the clinician thinks the patient can do. The clinician should record answers while administering the exam and work quickly. Except where indicated, the patient should not be coached (i.e., repeated requests to patient to make a special effort). 1a  Level of consciousness: 0=alert; keenly responsive   1b. LOC questions:  0=Performs both tasks correctly   1c.  LOC commands: 0=Performs both tasks correctly   2. Best Gaze: 0=normal   3. Visual: 0=No visual loss   4. Facial Palsy: 0=Normal symmetric movement   5a. Motor left arm: 0=No drift, limb holds 90 (or 45) degrees for full 10 seconds   5b. Motor right arm: 0=No drift, limb holds 90 (or 45) degrees for full 10 seconds   6a. motor left le=No drift, limb holds 90 (or 45) degrees for full 10 seconds   6b  Motor right le=No drift, limb holds 90 (or 45) degrees for full 10 seconds   7. Limb Ataxia: 1=Present in one limb   8. Sensory: 1=Mild to moderate sensory loss; patient feels pinprick is less sharp or is dull on the affected side; there is a loss of superficial pain with pinprick but patient is aware She is being touched   9. Best Language:  0=No aphasia, normal   10. Dysarthria: 0=Normal   11. Extinction and Inattention: 0=No abnormality   12. Distal motor function: 0=Normal    Total:  2     Modified [place] Score - Assessing Disability From Stroke    Score: 1 - No significant disability despite symptoms; able to carry out all usual duties and activities    LABS  I have reviewed all labs for this visit.    Results for orders placed or performed during the hospital encounter of 21   CBC Auto Differential   Result Value Ref Range    WBC 5.6 4.0 - 11.0 K/uL    RBC 4.46 4.00 - 5.20 M/uL    Hemoglobin 13.7 12.0 - 16.0 g/dL    Hematocrit 41.6 36.0 - 48.0 %    MCV 93.2 80.0 - 100.0 fL    MCH 30.7 26.0 - 34.0 pg    MCHC 33.0 31.0 - 36.0 g/dL    RDW 13.9 12.4 - 15.4 %    Platelets 597 750 - 154 K/uL    MPV 7.0 5.0 - 10.5 fL    Neutrophils % 76.8 %    Lymphocytes % 16.1 %    Monocytes % 6.7 %    Eosinophils % 0.1 %    Basophils % 0.3 %    Neutrophils Absolute 4.3 1.7 - 7.7 K/uL    Lymphocytes Absolute 0.9 (L) 1.0 - 5.1 K/uL    Monocytes Absolute 0.4 0.0 - 1.3 K/uL    Eosinophils Absolute 0.0 0.0 - 0.6 K/uL    Basophils Absolute 0.0 0.0 - 0.2 K/uL   Comprehensive Metabolic Panel w/ Reflex to MG   Result Value Ref Range    Sodium 138 136 - 145 P Axis 50 degrees    R Axis -7 degrees    T Axis 22 degrees    Diagnosis       Normal sinus rhythmRSR' or QR pattern in V1 suggests right ventricular conduction delayNonspecific ST abnormalityBorderline ECGNo previous ECGs availableConfirmed by Tameka RENTERIA MD ([id number]) on 3/18/2021 6:02:14 PM       EKG Interpretation    Interpreted by emergency department physician    The Ekg interpreted by me shows  normal sinus rhythm with a rate of 90  Axis is   Normal  QTc is  within an acceptable range  Intervals and Durations are unremarkable. ST Segments: normal      RADIOLOGY    CTA HEAD NECK W CONTRAST   Final Result   Unremarkable CT angiogram of the head and neck, with no evidence of vessel   dissection or irregularity, significant stenosis, or proximal intracranial   arterial occlusion. Comment:      Reference per NASCET criteria for degree of stenosis:  Mild: Less than 50%   stenosis. Moderate: 50-69% stenosis. Severe: 70-94% stenosis. Near-occlusion: 95-99% stenosis. XR CHEST PORTABLE   Final Result   Minimal left basilar atelectasis. Otherwise, clear lungs. Cardiomegaly. CT HEAD WO CONTRAST   Final Result   Addendum 1 of 1   ADDENDUM:   Results were called to MARVIN JUAREZ on 3/18/2021 at 13:54. Final          TIMES:  Last Known Well: 18  Arrival to ED: 9698  CT First Slice: 2713  CT Results: I did not receive a call  tPA Administration Time: n/a  Door to Needle: n/a  Time to ICU: n/a  Stroke Team: Case discussed with  Stroke Team, Dr. Sage Rosa at [id number].     Reason for Delays:  [] Social/Judaism  [] Initial refusal of testing or treatment  [] Care team unable to identify eligibility  [] Hypertension requiring aggressive control with IV medications  [] Further diagnostic evaluation to confirm stroke for patients with hypoglycemia (blood glucose <50), seizures, or major metabolic disorders  [] Management of concomitant emergent/acute conditions such as cardiopulmonary arrest, respiratory failure (requiring intubation)  [] Investigational or experimental protocol for thrombolysis    ED COURSE/MDM  Patient seen and evaluated. Old records reviewed. Labs and imaging reviewed and results discussed. The patient is a 55-year-old female presenting with neurological symptoms. Vital signs are within normal limits. She is noted to have some tremor diffusely, with generalized weakness, she stated her right arm is weak this morning. She has decreased sensation here, and limb ataxia on the right. We did call code stroke. She is not a TPA candidate since since last known normal was 5 AM. CT head and CTA are negative for anything acute. I am concerned with the right arm weakness and aphasia this morning that she could have had a TIA. She was noted to have a fever of 100.7 here. She was given Tylenol. The patient's lab work-up is unremarkable. There are no signs of infection. She did have Covid vaccination yesterday, these symptoms could all be related to the vaccination. No infection that would indicate need for antibiotics at this time. I do not suspect meningitis. The patient will be admitted for further treatment. She requests to be transferred to [facility]. I spoke with Dr. Yakov Reaves. We thoroughly discussed the history, physical exam, laboratory and imaging studies, as well as, emergency department course. Based upon that discussion, we've decided to admit James Vasquez for further observation and evaluation of Genny Harris's CVA-like symptoms. As I have deemed necessary from their history, physical and studies, I have considered and evaluated James Vasquez for the following diagnoses:  DIABETES, INTRACRANIAL HEMORRHAGE, MENINGITIS, SUBARACHNOID HEMORRHAGE, SUBDURAL HEMATOMA, & STROKE.     t-PA NOT given due to the following EXCLUSION CRITERIA (only those checked):  [] Pregnancy  [x] Symptoms > 3 hours of onset  [x] Minor or isolated neurological signs  []

## 2021-03-18 NOTE — ED NOTES
Cale from the transfer center [facility]      Jenni Dawkins  03/18/21 [id number]

## 2021-03-18 NOTE — ED NOTES
Spoke with Loren Medel at [facility], she advised her crew was detained at [facility] and is about 15 minutes out.       Letty Mcgraw  03/18/21 1915

## 2021-03-18 NOTE — ED NOTES
Pramod Johnston at [facility] accepted the run with an hour ETA.      Shea Beams  03/18/21 1745

## 2021-03-18 NOTE — ED NOTES
PT now states she feels as though she is more shaky and weaker in R hand. MD made aware.      Ceferino James RN  03/18/21 1558

## 2021-03-18 NOTE — CONSULTS
Stroke Team Phone Consultation:    I was contacted by Dr. Daisy Jiménez re: Ms. Julissa Cool. Briefly, Hedy Garcia is a 74 y.o. female with last seen well last night. This morning she had R arm weakness and decreased sensation on R arm, as well as aphasia, per Dr. Vivian Hernandez. At the time of her ED presentation, and when I received notification of the patient, her NIHSS was 2. She is not an IV tPA candidate because of time (LSW > 4.5 hours ago). SHe is not an endovascular candidate because of NIHSS < 6, and because no LVO on CTA. I recommend admission / transfer for additional workup of her symptoms.      Ravin Gusman MD [facility] Stroke Team

## 2021-03-19 NOTE — ED NOTES
Report given to [facility] team. Pt remains alert and oriented, no apparent signs of distress. Vitals remain WNL. Transport team denies further questions. Pt care transferred at this time. Report called to [facility]. Spoke to [facility]. RN denies further questions, pt transferred at this time.      Rakel Stratton RN  03/18/21 2015

## 2021-03-30 ENCOUNTER — VIRTUAL VISIT (OUTPATIENT)
Dept: PAIN MANAGEMENT | Age: 66
End: 2021-03-30
Payer: MEDICARE

## 2021-03-30 DIAGNOSIS — M79.7 FIBROMYALGIA: ICD-10-CM

## 2021-03-30 DIAGNOSIS — M51.37 DEGENERATION OF LUMBAR OR LUMBOSACRAL INTERVERTEBRAL DISC: ICD-10-CM

## 2021-03-30 DIAGNOSIS — M17.0 PRIMARY OSTEOARTHRITIS OF BOTH KNEES: ICD-10-CM

## 2021-03-30 DIAGNOSIS — G89.4 CHRONIC PAIN SYNDROME: ICD-10-CM

## 2021-03-30 DIAGNOSIS — M19.90 ARTHRITIS: ICD-10-CM

## 2021-03-30 PROCEDURE — 99213 OFFICE O/P EST LOW 20 MIN: CPT | Performed by: INTERNAL MEDICINE

## 2021-03-30 RX ORDER — OXYCODONE HYDROCHLORIDE 5 MG/1
5 TABLET ORAL EVERY 6 HOURS PRN
Qty: 84 TABLET | Refills: 0 | Status: SHIPPED | OUTPATIENT
Start: 2021-03-30 | End: 2021-04-27 | Stop reason: SDUPTHER

## 2021-03-30 RX ORDER — MORPHINE SULFATE 30 MG/1
30 TABLET, FILM COATED, EXTENDED RELEASE ORAL DAILY
Qty: 28 TABLET | Refills: 0 | Status: SHIPPED | OUTPATIENT
Start: 2021-03-30 | End: 2021-04-27 | Stop reason: SDUPTHER

## 2021-03-30 NOTE — PROGRESS NOTES
TELE HEALTH VISIT (AUDIO-VISUAL)    Pursuant to the emergency declaration under the [facility] waiver authority and the [employer] Act, this Virtual  Visit was conducted, with patient's/legal guardian's consent, to reduce the patient's risk of exposure to COVID-19 and provide continuity of care for an established patient. Service is  provided through a video synchronous discussion virtually to substitute for in-person clinic visit. Due to this being a TeleHealth encounter (During [facility] public health emergency), evaluation of the following organ systems was limited: Vitals/Constitutional/EENT/Resp/CV/GI//MS/Neuro/Skin/Zreg-Jevf-Vkb. Rosendo Stratton  1955  <[id number]>    Ms. Barbara Bearden is being seen virtually for a follow up visit using one of the following techniques  Google Duo, Face time or Doxy. me  Informed verbal consent to the virtual visit was obtained from Ms. Barbara Bearden. Risks associated with HIPPA compliance with the virtual visit was explained to the patient. Ms. Barbara Bearden is at her residence and Dr. Beatrice Garcia is in his office. HISTORY OF PRESENT ILLNESS:  Ms. Barbara Bearden is a 74 y.o. female returns for a follow up visit for multiple medical problems. Her current presenting problems are   1. Chronic pain syndrome    2. Fibromyalgia    3. Degeneration of lumbar or lumbosacral intervertebral disc    4. Primary osteoarthritis of both knees    . As per information/history obtained from the PADT(patient assessment and documentation tool) - She complains of pain in the generalized pain all over with radiation to the upper back, mid back and lower back She rates the pain 5/10 and describes it as stabbing. Pain is made worse by: standing. Current treatment regimen has helped relieve about 60% of the pain. She denies side effects from the current pain regimen. Patient reports that since the last follow up visit the physical functioning is unchanged, family/social relationships are unchanged, mood is unchanged and sleep patterns are unchanged, and that the overall functioning is unchanged. Patient denies neurological bowel or bladder. Patient denies misusing/abusing her narcotic pain medications or using any illegal drugs. There are No indicators for possible drug abuse, addiction or diversion problems. Upon obtaining the medical history from Ms. Rosanna Gray regarding today's office visit for her presenting problems, patient states she has been doing about the same. She denies any side effects with the medications. She mentions the pain has been manageable. She says she was in the hospital recently for TIA, doing better now. She reports he maybe starting Plavix. ALLERGIES: Patients list of allergies were reviewed     MEDICATIONS: Ms. Rosanna Gray list of medications were reviewed. Her current medications are   Outpatient Medications Prior to Visit   Medication Sig Dispense Refill    morphine (MS CONTIN) 30 MG extended release tablet Take 1 tablet by mouth daily for 28 days. 28 tablet 0    oxyCODONE (ROXICODONE) 5 MG immediate release tablet Take 1 tablet by mouth every 6 hours as needed for Pain (max 3 per day) for up to 28 days. 84 tablet 0    gabapentin (NEURONTIN) 600 MG tablet TAKE ONE TABLET BY MOUTH EVERY MORNING, 1 TABLET IN THE AFTERNOON, AND TAKE TWO TABLETS BY MOUTH EVERY EVENING 360 tablet 0    diclofenac (VOLTAREN) 75 MG EC tablet Take one tablet po daily as needed 90 tablet 0    topiramate (TOPAMAX) 25 MG tablet TAKE ONE TABLET BY MOUTH TWICE A DAY 60 tablet 1    amitriptyline (ELAVIL) 25 MG tablet TAKE ONE TO TWO TABLETS BY MOUTH ONCE NIGHTLY 180 tablet 0    ketoconazole (NIZORAL) 2 % shampoo Wash scalp 3 times per week.  1 Bottle 5    FLUoxetine (PROZAC) 40 MG capsule       Prenatal Vit-Fe Fumarate-FA (PNV PRENATAL PLUS MULTIVITAMIN) 27-1 MG TABS       Prenatal Vit-DSS-Fe Cbn-FA (VIRT-CHAN GT) 90-1 MG TABS       omeprazole (PRILOSEC) 40 MG capsule Take 40 mg by mouth daily.  KLOR-CON M20 20 MEQ tablet TAKE TWO TABLETS BY MOUTH TWICE A  tablet 3    VIVELLE-DOT 0.05 MG/24HR APPLY 1 PATCH TO SKIN TWICE WEEKLY 8 patch 11    furosemide (LASIX) 20 MG tablet Take 20 mg by mouth 3 times daily.  therapeutic multivitamin-minerals (THERAGRAN-M) tablet Take 1 tablet by mouth daily. No facility-administered medications prior to visit. REVIEW OF SYSTEMS:    Respiratory: Negative for apnea, chest tightness and shortness of breath or change in baseline breathing. PHYSICAL EXAM:   Nursing note and vitals reviewed. There were no vitals taken for this visit. Constitutional: She appears well-developed and well-nourished. No acute distress. Cardiovascular: Normal rate, regular rhythm, normal heart sounds, and does not have murmur. Pulmonary/Chest: Effort normal. No respiratory distress. She does not have wheezes in the lung fields. She has no rales. Neurological/Psychiatric:She is alert and oriented to person, place, and time. Coordination is  normal.  Her mood isAppropriate and affect is Neutral/Euthymic(normal) . Her    IMPRESSION:   1. Chronic pain syndrome    2. Fibromyalgia    3. Degeneration of lumbar or lumbosacral intervertebral disc    4.  Primary osteoarthritis of both knees        PLAN:  Informed verbal consent was obtained  -continue with current opioid regimen Ms Contin along with Oxycodone   -Interm history reviewed    -hold Voltaren   -She was advised to increase fluids ( 5-7  glasses of fluid daily), limit caffeine, avoid cheese products, increase dietary fiber, increase activity and exercise as tolerated and relax regularly and enjoy meals   -She was advised weight reduction, diet changes- 800-1200 idalia diet, diet diary, exercising, nutritional  consult increased physical activity as tolerated   -ROM/Stretching exercises as advised      Current Outpatient Medications   Medication Sig Dispense Refill    morphine (MS CONTIN) 30 MG extended release tablet Take 1 tablet by mouth daily for 28 days. 28 tablet 0    oxyCODONE (ROXICODONE) 5 MG immediate release tablet Take 1 tablet by mouth every 6 hours as needed for Pain (max 3 per day) for up to 28 days. 84 tablet 0    gabapentin (NEURONTIN) 600 MG tablet TAKE ONE TABLET BY MOUTH EVERY MORNING, 1 TABLET IN THE AFTERNOON, AND TAKE TWO TABLETS BY MOUTH EVERY EVENING 360 tablet 0    diclofenac (VOLTAREN) 75 MG EC tablet Take one tablet po daily as needed 90 tablet 0    topiramate (TOPAMAX) 25 MG tablet TAKE ONE TABLET BY MOUTH TWICE A DAY 60 tablet 1    amitriptyline (ELAVIL) 25 MG tablet TAKE ONE TO TWO TABLETS BY MOUTH ONCE NIGHTLY 180 tablet 0    ketoconazole (NIZORAL) 2 % shampoo Wash scalp 3 times per week. 1 Bottle 5    FLUoxetine (PROZAC) 40 MG capsule       Prenatal Vit-Fe Fumarate-FA (PNV PRENATAL PLUS MULTIVITAMIN) 27-1 MG TABS       Prenatal Vit-DSS-Fe Cbn-FA (VIRT-CHAN GT) 90-1 MG TABS       omeprazole (PRILOSEC) 40 MG capsule Take 40 mg by mouth daily.  KLOR-CON M20 20 MEQ tablet TAKE TWO TABLETS BY MOUTH TWICE A  tablet 3    VIVELLE-DOT 0.05 MG/24HR APPLY 1 PATCH TO SKIN TWICE WEEKLY 8 patch 11    furosemide (LASIX) 20 MG tablet Take 20 mg by mouth 3 times daily.  therapeutic multivitamin-minerals (THERAGRAN-M) tablet Take 1 tablet by mouth daily. No current facility-administered medications for this visit. I will continue her current medication regimen  which is part of the above treatment schedule. It has been helping with Ms. Harris's chronic  medical problems which for this visit include:   Diagnoses of Chronic pain syndrome, Fibromyalgia, Degeneration of lumbar or lumbosacral intervertebral disc, and Primary osteoarthritis of both knees were pertinent to this visit.    Risks and benefits of the medications and other alternative treatments  including no treatment were discussed with the patient. The common side effects of these medications were also explained to the patient. Informed verbal consent was obtained. Goals of current treatment regimen include improvement in pain, restoration of functioning- with focus on improvement in physical performance, general activity, work or disability,emotional distress, health care utilization and  decreased medication consumption. Will continue to monitor progress towards achieving/maintaining therapeutic goals with special emphasis on  1. Improvement in perceived interfernce  of pain with ADL's. Ability to do home exercises independently. Ability to do household chores indoor and/or outdoor work and social and leisure activities. Improve psychosocial and physical functioning. - she is showing progression towards this treatment goal with the current regimen. She was advised against drinking alcohol with the narcotic pain medicines, advised against driving or handling machinery while adjusting the dose of medicines or if having cognitive  issues related to the current medications. Risk of overdose and death, if medicines not taken as prescribed, were also discussed. If the patient develops new symptoms or if the symptoms worsen, the patient should call the office. While transcribing every attempt was made to maintain the accuracy of the note in terms of it's contents,there may have been some errors made inadvertently. Thank you for allowing me to participate in the care of this patient.     Armin Linares MD.    Cc: Gilles Enriquez MD

## 2021-03-31 ENCOUNTER — TELEPHONE (OUTPATIENT)
Dept: PAIN MANAGEMENT | Age: 66
End: 2021-03-31

## 2021-04-27 ENCOUNTER — VIRTUAL VISIT (OUTPATIENT)
Dept: PAIN MANAGEMENT | Age: 66
End: 2021-04-27
Payer: MEDICARE

## 2021-04-27 DIAGNOSIS — M51.37 DEGENERATION OF LUMBAR OR LUMBOSACRAL INTERVERTEBRAL DISC: ICD-10-CM

## 2021-04-27 DIAGNOSIS — G89.4 CHRONIC PAIN SYNDROME: ICD-10-CM

## 2021-04-27 DIAGNOSIS — M79.7 FIBROMYALGIA: ICD-10-CM

## 2021-04-27 DIAGNOSIS — M17.0 PRIMARY OSTEOARTHRITIS OF BOTH KNEES: ICD-10-CM

## 2021-04-27 DIAGNOSIS — M19.90 ARTHRITIS: ICD-10-CM

## 2021-04-27 PROCEDURE — 99213 OFFICE O/P EST LOW 20 MIN: CPT | Performed by: INTERNAL MEDICINE

## 2021-04-27 RX ORDER — MORPHINE SULFATE 30 MG/1
30 TABLET, FILM COATED, EXTENDED RELEASE ORAL DAILY
Qty: 28 TABLET | Refills: 0 | Status: SHIPPED | OUTPATIENT
Start: 2021-04-27 | End: 2021-05-25 | Stop reason: SDUPTHER

## 2021-04-27 RX ORDER — TOPIRAMATE 25 MG/1
TABLET ORAL
Qty: 60 TABLET | Refills: 1 | Status: SHIPPED | OUTPATIENT
Start: 2021-04-27 | End: 2021-05-25 | Stop reason: SDUPTHER

## 2021-04-27 RX ORDER — GABAPENTIN 600 MG/1
TABLET ORAL
Qty: 360 TABLET | Refills: 0 | Status: SHIPPED | OUTPATIENT
Start: 2021-04-27 | End: 2021-05-25 | Stop reason: SDUPTHER

## 2021-04-27 RX ORDER — DICLOFENAC SODIUM 75 MG/1
TABLET, DELAYED RELEASE ORAL
Qty: 90 TABLET | Refills: 0 | Status: SHIPPED | OUTPATIENT
Start: 2021-04-27 | End: 2021-05-25 | Stop reason: SDUPTHER

## 2021-04-27 RX ORDER — OXYCODONE HYDROCHLORIDE 5 MG/1
5 TABLET ORAL EVERY 6 HOURS PRN
Qty: 84 TABLET | Refills: 0 | Status: SHIPPED | OUTPATIENT
Start: 2021-04-27 | End: 2021-05-25 | Stop reason: SDUPTHER

## 2021-04-27 RX ORDER — AMITRIPTYLINE HYDROCHLORIDE 25 MG/1
TABLET, FILM COATED ORAL
Qty: 180 TABLET | Refills: 0 | Status: SHIPPED | OUTPATIENT
Start: 2021-04-27 | End: 2021-05-25 | Stop reason: SDUPTHER

## 2021-04-27 NOTE — PROGRESS NOTES
daily.      KLOR-CON M20 20 MEQ tablet TAKE TWO TABLETS BY MOUTH TWICE A  tablet 3    VIVELLE-DOT 0.05 MG/24HR APPLY 1 PATCH TO SKIN TWICE WEEKLY 8 patch 11    furosemide (LASIX) 20 MG tablet Take 20 mg by mouth 3 times daily.  therapeutic multivitamin-minerals (THERAGRAN-M) tablet Take 1 tablet by mouth daily.  gabapentin (NEURONTIN) 600 MG tablet TAKE ONE TABLET BY MOUTH EVERY MORNING, 1 TABLET IN THE AFTERNOON, AND TAKE TWO TABLETS BY MOUTH EVERY EVENING 360 tablet 0     No facility-administered medications prior to visit. REVIEW OF SYSTEMS:    Respiratory: Negative for apnea, chest tightness and shortness of breath or change in baseline breathing. PHYSICAL EXAM:   Nursing note and vitals reviewed. There were no vitals taken for this visit. Constitutional: She appears well-developed and well-nourished. No acute distress. Cardiovascular: Normal rate, regular rhythm, normal heart sounds, and does not have murmur. Pulmonary/Chest: Effort normal. No respiratory distress. She does not have wheezes in the lung fields. She has no rales. Neurological/Psychiatric:She is alert and oriented to person, place, and time. Coordination is  normal.  Her mood isAppropriate and affect is Neutral/Euthymic(normal) . Her    IMPRESSION:   1. Chronic pain syndrome    2. Fibromyalgia    3. Degeneration of lumbar or lumbosacral intervertebral disc    4. Arthritis    5.  Primary osteoarthritis of both knees        PLAN:  Informed verbal consent was obtained  -continue with current opioid regimen Ms Contin along with Oxycodone for BTP  -She was advised to increase fluids ( 5-7  glasses of fluid daily), limit caffeine, avoid cheese products, increase dietary fiber, increase activity and exercise as tolerated and relax regularly and enjoy meals   -advised to walk 20-30 minutes daily as tolerated   -She was advised weight reduction, diet changes- 800-1200 idalia diet, diet diary, exercising, nutritional benefits of the medications and other alternative treatments  including no treatment were discussed with the patient. The common side effects of these medications were also explained to the patient. Informed verbal consent was obtained. Goals of current treatment regimen include improvement in pain, restoration of functioning- with focus on improvement in physical performance, general activity, work or disability,emotional distress, health care utilization and  decreased medication consumption. Will continue to monitor progress towards achieving/maintaining therapeutic goals with special emphasis on  1. Improvement in perceived interfernce  of pain with ADL's. Ability to do home exercises independently. Ability to do household chores indoor and/or outdoor work and social and leisure activities. Improve psychosocial and physical functioning. - she is showing progression towards this treatment goal with the current regimen. She was advised against drinking alcohol with the narcotic pain medicines, advised against driving or handling machinery while adjusting the dose of medicines or if having cognitive  issues related to the current medications. Risk of overdose and death, if medicines not taken as prescribed, were also discussed. If the patient develops new symptoms or if the symptoms worsen, the patient should call the office. While transcribing every attempt was made to maintain the accuracy of the note in terms of it's contents,there may have been some errors made inadvertently. Thank you for allowing me to participate in the care of this patient.     Kyleigh Cline MD.    Cc: Faiza Stoner MD

## 2021-04-29 ENCOUNTER — TELEPHONE (OUTPATIENT)
Dept: PAIN MANAGEMENT | Age: 66
End: 2021-04-29

## 2021-04-29 NOTE — TELEPHONE ENCOUNTER
Patient's  Zoe Carpenter called stating they received a letter from her insurance stating that they will no longer cover her Morphine. They either need a letter from [facility] stating why she needs to be on morphine, or [facility] needs to prescribe an alternative medication. He said the letter did not have an fax # to send the responding correspondence to.     Please advise

## 2021-04-29 NOTE — TELEPHONE ENCOUNTER
Submitted PA for MORPHINE ER  Via CMM Key: [id number] STATUS: \"Approved, Coverage Starts on: 1/28/2021 12:00:00 AM, Coverage Ends on: 10/26/2021\"    The patient was notified by phone; I LVM.

## 2021-05-25 ENCOUNTER — TELEPHONE (OUTPATIENT)
Dept: PAIN MANAGEMENT | Age: 66
End: 2021-05-25

## 2021-05-25 ENCOUNTER — VIRTUAL VISIT (OUTPATIENT)
Dept: PAIN MANAGEMENT | Age: 66
End: 2021-05-25
Payer: MEDICARE

## 2021-05-25 DIAGNOSIS — M79.7 FIBROMYALGIA: ICD-10-CM

## 2021-05-25 DIAGNOSIS — G89.4 CHRONIC PAIN SYNDROME: ICD-10-CM

## 2021-05-25 DIAGNOSIS — M17.0 PRIMARY OSTEOARTHRITIS OF BOTH KNEES: ICD-10-CM

## 2021-05-25 DIAGNOSIS — M19.90 ARTHRITIS: ICD-10-CM

## 2021-05-25 DIAGNOSIS — M51.37 DEGENERATION OF LUMBAR OR LUMBOSACRAL INTERVERTEBRAL DISC: ICD-10-CM

## 2021-05-25 PROCEDURE — 99213 OFFICE O/P EST LOW 20 MIN: CPT | Performed by: INTERNAL MEDICINE

## 2021-05-25 RX ORDER — OXYCODONE HYDROCHLORIDE 5 MG/1
5 TABLET ORAL EVERY 6 HOURS PRN
Qty: 84 TABLET | Refills: 0 | Status: SHIPPED | OUTPATIENT
Start: 2021-05-25 | End: 2021-06-22 | Stop reason: SDUPTHER

## 2021-05-25 RX ORDER — GABAPENTIN 600 MG/1
TABLET ORAL
Qty: 360 TABLET | Refills: 0 | Status: SHIPPED | OUTPATIENT
Start: 2021-05-25 | End: 2021-06-22 | Stop reason: SDUPTHER

## 2021-05-25 RX ORDER — MORPHINE SULFATE 30 MG/1
30 TABLET, FILM COATED, EXTENDED RELEASE ORAL DAILY
Qty: 28 TABLET | Refills: 0 | Status: SHIPPED | OUTPATIENT
Start: 2021-05-25 | End: 2021-06-22 | Stop reason: SDUPTHER

## 2021-05-25 RX ORDER — TOPIRAMATE 25 MG/1
TABLET ORAL
Qty: 60 TABLET | Refills: 1 | Status: SHIPPED | OUTPATIENT
Start: 2021-05-25 | End: 2021-06-22 | Stop reason: SDUPTHER

## 2021-05-25 RX ORDER — DICLOFENAC SODIUM 75 MG/1
TABLET, DELAYED RELEASE ORAL
Qty: 90 TABLET | Refills: 0 | Status: SHIPPED | OUTPATIENT
Start: 2021-05-25 | End: 2021-06-22 | Stop reason: SDUPTHER

## 2021-05-25 RX ORDER — AMITRIPTYLINE HYDROCHLORIDE 25 MG/1
TABLET, FILM COATED ORAL
Qty: 180 TABLET | Refills: 0 | Status: SHIPPED | OUTPATIENT
Start: 2021-05-25 | End: 2021-06-22 | Stop reason: SDUPTHER

## 2021-05-25 NOTE — PROGRESS NOTES
TELE HEALTH VISIT (AUDIO-VISUAL)    Pursuant to the emergency declaration under the [facility], [facility] waiver authority and the [employer] and [employer] Act, this Virtual  Visit was conducted, with patient's/legal guardian's consent, to reduce the patient's risk of exposure to COVID-19 and provide continuity of care for an established patient. Service is  provided through a video synchronous discussion virtually to substitute for in-person clinic visit. Due to this being a TeleHealth encounter (During WRLSR-44 public health emergency), evaluation of the following organ systems was limited: Vitals/Constitutional/EENT/Resp/CV/GI//MS/Neuro/Skin/Kxtb-Csye-Xjv. [employer]ams  1955  <[id number]>    Ms. Adelso Malone is being seen virtually for a follow up visit using one of the following techniques  Google Duo Face time Doxy. me  Informed verbal consent to the virtual visit was obtained from Ms. Adelso Malone. Risks associated with HIPPA compliance with the virtual visit was explained to the patient. Ms. Adelso Malone is at her residence and Dr. Amalia Lujan is in his office. HISTORY OF PRESENT ILLNESS:  Ms. Adelso Malone is a 74 y.o. female returns for a follow up visit for multiple medical problems. Her current presenting problems are   1. Chronic pain syndrome    2. Fibromyalgia    3. Degeneration of lumbar or lumbosacral intervertebral disc    4. Arthritis    5. Primary osteoarthritis of both knees    6. Chronic migraine without aura without status migrainosus, not intractable    . As per information/history obtained from the PADT(patient assessment and documentation tool) - She complains of pain in the mid back and lower back with radiation to the mid back and lower back She rates the pain 6/10 and describes it as stabbing. Pain is made worse by: movement, walking, standing. Current treatment regimen has helped relieve about 50% of the pain. She denies side effects from the current pain regimen. Patient reports that since the last follow up visit the physical functioning is unchanged, family/social relationships are unchanged, mood is unchanged and sleep patterns are unchanged, and that the overall functioning is unchanged. Patient denies neurological bowel or bladder. Patient denies misusing/abusing her narcotic pain medications or using any illegal drugs. There are No indicators for possible drug abuse, addiction or diversion problems. Upon obtaining the medical history from Ms. Fernanda Coyle regarding today's office visit for her presenting problems, patient states she has been doing fair, her pain has been baseline. She states she is having a lot of family stressors. She mentions she is using Ms Contin along with Oxycodone for BTP along with the other adjuvants. Patient is complaining of her back hurting worse than his legs. She says her headache symptoms are tolerable. ALLERGIES: Patients list of allergies were reviewed     MEDICATIONS: Ms. Fernanda Coyle list of medications were reviewed. Her current medications are   Outpatient Medications Prior to Visit   Medication Sig Dispense Refill    morphine (MS CONTIN) 30 MG extended release tablet Take 1 tablet by mouth daily for 28 days. 28 tablet 0    oxyCODONE (ROXICODONE) 5 MG immediate release tablet Take 1 tablet by mouth every 6 hours as needed for Pain (max 3 per day) for up to 28 days.  84 tablet 0    gabapentin (NEURONTIN) 600 MG tablet TAKE ONE TABLET BY MOUTH EVERY MORNING, 1 TABLET IN THE AFTERNOON, AND TAKE TWO TABLETS BY MOUTH EVERY EVENING 360 tablet 0    topiramate (TOPAMAX) 25 MG tablet TAKE ONE TABLET BY MOUTH TWICE A DAY 60 tablet 1    amitriptyline (ELAVIL) 25 MG tablet TAKE ONE TO TWO TABLETS BY MOUTH ONCE NIGHTLY 180 tablet 0    diclofenac (VOLTAREN) 75 MG EC tablet Take one tablet po daily as needed 90 tablet 0    ketoconazole (NIZORAL) 2 % shampoo Wash scalp 3 times per will continue her current medication regimen  which is part of the above treatment schedule. It has been helping with Ms. Harris's chronic  medical problems which for this visit include:   Diagnoses of Chronic pain syndrome, Fibromyalgia, Degeneration of lumbar or lumbosacral intervertebral disc, Arthritis, Primary osteoarthritis of both knees, and Chronic migraine without aura without status migrainosus, not intractable were pertinent to this visit. Risks and benefits of the medications and other alternative treatments  including no treatment were discussed with the patient. The common side effects of these medications were also explained to the patient. Informed verbal consent was obtained. Goals of current treatment regimen include improvement in pain, restoration of functioning- with focus on improvement in physical performance, general activity, work or disability,emotional distress, health care utilization and  decreased medication consumption. Will continue to monitor progress towards achieving/maintaining therapeutic goals with special emphasis on  1. Improvement in perceived interfernce  of pain with ADL's. Ability to do home exercises independently. Ability to do household chores indoor and/or outdoor work and social and leisure activities. Improve psychosocial and physical functioning. - she is showing progression towards this treatment goal with the current regimen. She was advised against drinking alcohol with the narcotic pain medicines, advised against driving or handling machinery while adjusting the dose of medicines or if having cognitive  issues related to the current medications. Risk of overdose and death, if medicines not taken as prescribed, were also discussed. If the patient develops new symptoms or if the symptoms worsen, the patient should call the office.     While transcribing every attempt was made to maintain the accuracy of the note in terms of it's contents,there may have been some errors made inadvertently. Thank you for allowing me to participate in the care of this patient.     Becky Merlos MD.    Cc: Becca Ortiz MD

## 2021-06-22 ENCOUNTER — VIRTUAL VISIT (OUTPATIENT)
Dept: PAIN MANAGEMENT | Age: 66
End: 2021-06-22
Payer: MEDICARE

## 2021-06-22 DIAGNOSIS — M17.0 PRIMARY OSTEOARTHRITIS OF BOTH KNEES: ICD-10-CM

## 2021-06-22 DIAGNOSIS — F39 MOOD DISORDER (HCC): ICD-10-CM

## 2021-06-22 DIAGNOSIS — F51.01 PRIMARY INSOMNIA: ICD-10-CM

## 2021-06-22 DIAGNOSIS — M79.7 FIBROMYALGIA: ICD-10-CM

## 2021-06-22 DIAGNOSIS — G89.4 CHRONIC PAIN SYNDROME: ICD-10-CM

## 2021-06-22 DIAGNOSIS — G43.709 CHRONIC MIGRAINE WITHOUT AURA WITHOUT STATUS MIGRAINOSUS, NOT INTRACTABLE: ICD-10-CM

## 2021-06-22 DIAGNOSIS — M19.90 ARTHRITIS: ICD-10-CM

## 2021-06-22 DIAGNOSIS — M51.37 DEGENERATION OF LUMBAR OR LUMBOSACRAL INTERVERTEBRAL DISC: ICD-10-CM

## 2021-06-22 PROCEDURE — 99214 OFFICE O/P EST MOD 30 MIN: CPT | Performed by: INTERNAL MEDICINE

## 2021-06-22 RX ORDER — TOPIRAMATE 25 MG/1
TABLET ORAL
Qty: 60 TABLET | Refills: 1 | Status: SHIPPED | OUTPATIENT
Start: 2021-06-22 | End: 2021-08-19 | Stop reason: SDUPTHER

## 2021-06-22 RX ORDER — OXYCODONE HYDROCHLORIDE 5 MG/1
5 TABLET ORAL EVERY 6 HOURS PRN
Qty: 84 TABLET | Refills: 0 | Status: SHIPPED | OUTPATIENT
Start: 2021-06-22 | End: 2021-07-22 | Stop reason: SDUPTHER

## 2021-06-22 RX ORDER — AMITRIPTYLINE HYDROCHLORIDE 25 MG/1
TABLET, FILM COATED ORAL
Qty: 180 TABLET | Refills: 0 | Status: SHIPPED | OUTPATIENT
Start: 2021-06-22 | End: 2021-09-16 | Stop reason: SDUPTHER

## 2021-06-22 RX ORDER — GABAPENTIN 600 MG/1
TABLET ORAL
Qty: 360 TABLET | Refills: 0 | Status: SHIPPED | OUTPATIENT
Start: 2021-06-22 | End: 2021-09-16 | Stop reason: SDUPTHER

## 2021-06-22 RX ORDER — DICLOFENAC SODIUM 75 MG/1
TABLET, DELAYED RELEASE ORAL
Qty: 90 TABLET | Refills: 0 | Status: SHIPPED | OUTPATIENT
Start: 2021-06-22 | End: 2021-09-16 | Stop reason: SDUPTHER

## 2021-06-22 RX ORDER — MORPHINE SULFATE 30 MG/1
30 TABLET, FILM COATED, EXTENDED RELEASE ORAL DAILY
Qty: 28 TABLET | Refills: 0 | Status: SHIPPED | OUTPATIENT
Start: 2021-06-22 | End: 2021-07-22 | Stop reason: SDUPTHER

## 2021-06-22 NOTE — PROGRESS NOTES
TELE HEALTH VISIT (AUDIO-VISUAL)    Pursuant to the emergency declaration under the [facility] waiver authority and the [employer] and [employer] Act, this Virtual  Visit was conducted, with patient's/legal guardian's consent, to reduce the patient's risk of exposure to COVID-19 and provide continuity of care for an established patient. Service is  provided through a video synchronous discussion virtually to substitute for in-person clinic visit. Due to this being a TeleHealth encounter (During NOQVN-22 public health emergency), evaluation of the following organ systems was limited: Vitals/Constitutional/EENT/Resp/CV/GI//MS/Neuro/Skin/Lgdz-Dcio-Ymj. Roxanne Corona  1955  <[id number]>    Ms. Vicente Obrien is being seen virtually for a follow up visit using one of the following techniques  Google Duo Face time Doxy. me  Informed verbal consent to the virtual visit was obtained from Ms. Vicente Obrien. Risks associated with HIPPA compliance with the virtual visit was explained to the patient. Ms. Vicente Obrien is at her residence and Dr. Nicki Yeboah is in his office. HISTORY OF PRESENT ILLNESS:  Ms. Vicente Obrien is a 74 y.o. female returns for a follow up visit for multiple medical problems. Her current presenting problems are   1. Chronic pain syndrome    2. Fibromyalgia    3. Arthritis    4. Primary osteoarthritis of both knees    5. Chronic migraine without aura without status migrainosus, not intractable    6. Mood disorder ([facility] [address]    7. Primary insomnia    8. Degeneration of lumbar or lumbosacral intervertebral disc    . As per information/history obtained from the PADT(patient assessment and documentation tool) - She complains of pain in the neck, abdomen, upper back, mid back, lower back, upper leg Bilateral, knees Bilateral and lower leg Bilateral with radiation to the abdomen She rates the pain 6/10 and describes it as sharp.   Pain is made worse by: movement, walking, standing, bending. Current treatment regimen has helped relieve about 50% of the pain. She denies side effects from the current pain regimen. Patient reports that since the last follow up visit the physical functioning is unchanged, family/social relationships are unchanged, mood is unchanged and sleep patterns are unchanged, and that the overall functioning is unchanged. Patient denies neurological bowel or bladder. Patient denies misusing/abusing her narcotic pain medications or using any illegal drugs. There are No indicators for possible drug abuse, addiction or diversion problems. Upon obtaining the medical history from Ms. Alexx Guerrero regarding today's office visit for her presenting problems, patient states she has been doing about the same, pain has been baseline, has good and bad days. She mentions she is using Ms Contin along with Oxycodone for BTP. she was advised  to avoid using too many OTC analgesics to control the headaches, avoid chocolates, increased caffeine, cheeses and MSG nitrite containing foods, cigarette smoking. To avoid bright lights, strong smells and skipping meals. She mentions she is on Topamax. Patient states her sleep is fair. Has fairly normal sleep latency. Averages about 4-6 hours of sleep a night. Denies any signs of sleep apnea. Feels somewhat rested in the morning. She says she is using Elavil 25 mg 1-2 at night. She denies any constipation symptoms. She reports she is managing her ADls and house chores. She says she has been walking daily. She states her weight has been stable. Patient's  subjective report of her mood is good. she denies any symptoms of depression or irritability or mood swings. Describes her mood as being good and reports pleasure in their daily activities. Reports  normal appetite, energy and concentration. Able to function well in different aspects of their daily activities. Denies suicidal or homicidal ideation. Cardiovascular: Negative for chest pain, palpitations  Gastrointestinal: Negative for blood in stool, abdominal distention, nausea, vomiting, abdominal pain, diarrhea,constipation. Neurological: Negative for speech difficulty, weakness and light-headedness, dizziness, tremors, sleepiness  Psychiatric/Behavioral: Negative for suicidal ideas, hallucinations, behavioral problems, self-injury, decreased concentration/cognition, agitation, confusion. PHYSICAL EXAM:   Nursing note and vitals reviewed. There were no vitals taken for this visit. General Appearance: Patient is well nourished, well developed, well groomed and in no acute distress. Skin: Skin is warm and dry, good turgor . No rash or lesions noted. She is not diaphoretic. Pulmonary/Chest: Effort normal. No respiratory distress or use of accessory muscles. Auscultation revealing normal air entry. She does not have wheezes in the lung fields. She has no rales. Cardiovascular: Normal rate, regular rhythm, normal heart sounds, and does not have murmur. Exam reveals no gallop and no friction rub. Musculoskeletal / Extremities: Range of motion is normal. Gait is normal, assistive devices use: none. She exhibits edema: none, and no tenderness. Neurological/Psychiatric:She is alert and oriented to person, place, and time. Coordination is  normal.   Judgement and Insight is normal  Her mood is Appropriate and affect is Anxious . Her behavior is normal.   thought content normal.        IMPRESSION:     1. Chronic pain syndrome - STABLE: Continue current treatment plan    2. Fibromyalgia  - STABLE: Continue current treatment plan    3. Arthritis - STABLE: Continue current treatment plan    4. Primary osteoarthritis of both knees - STABLE: Continue current treatment plan    5. Chronic migraine without aura without status migrainosus, not intractable - STABLE: Continue current treatment plan    6.  [facility] - STABLE: Continue current treatment plan    7. Primary insomnia - STABLE: Continue current treatment plan    8. Degeneration of lumbar or lumbosacral intervertebral disc - STABLE: Continue current treatment plan        PLAN:  Informed verbal consent was obtained.  -she was advised  to avoid using too many OTC analgesics to control the headaches, avoid chocolates, increased caffeine, cheeses and MSG nitrite containing foods, cigarette smoking. To avoid bright lights, strong smells and skipping meals. -ROM/Stretching exercises as advised   -She was advised to increase fluids ( 5-7  glasses of fluid daily), limit caffeine, avoid cheese products, increase dietary fiber, increase activity and exercise as tolerated and relax regularly and enjoy meals   -Continue with Ms Contin with Oxycodone forBTP   -she was advised  to avoid using too many OTC analgesics to control the headaches, avoid chocolates, increased caffeine, cheeses and MSG nitrite containing foods, cigarette smoking. To avoid bright lights, strong smells and skipping meals.   -Continue with Topamax 25 mg BID   -she was advised proper sleep hygiene-told to avoid:use of caffeine or other stimulants after noon, alcohol use near bedtime, long or frequent naps during the day, erratic sleep schedule, heavy meals near bedtime, vigorous exercise near bedtime and use of electronic devices near bedtime   -Continue with Elavil 25 mg   -Continue with Neurontin 2400 mg   -She was advised weight reduction, diet changes- 800-1200 idalia diet, diet diary, exercising, nutritional  consult increased physical activity as tolerated   Ms. Lestine Duverney will be prescribed  the medications  listed below which are for treatment of her presenting  medical problems which for this visit include:   Diagnoses of Chronic pain syndrome, Fibromyalgia, Arthritis, Primary osteoarthritis of both knees, Chronic migraine without aura without status migrainosus, not intractable, Mood disorder ([facility] [address] Primary insomnia, and Degeneration of lumbar or lumbosacral intervertebral disc were pertinent to this visit. Medications/orders associated with this visit:    Current Outpatient Medications   Medication Sig Dispense Refill    morphine (MS CONTIN) 30 MG extended release tablet Take 1 tablet by mouth daily for 28 days. 28 tablet 0    oxyCODONE (ROXICODONE) 5 MG immediate release tablet Take 1 tablet by mouth every 6 hours as needed for Pain (max 3 per day) for up to 28 days. 84 tablet 0    gabapentin (NEURONTIN) 600 MG tablet TAKE ONE TABLET BY MOUTH EVERY MORNING, 1 TABLET IN THE AFTERNOON, AND TAKE TWO TABLETS BY MOUTH EVERY EVENING 360 tablet 0    topiramate (TOPAMAX) 25 MG tablet TAKE ONE TABLET BY MOUTH TWICE A DAY 60 tablet 1    amitriptyline (ELAVIL) 25 MG tablet TAKE ONE TO TWO TABLETS BY MOUTH ONCE NIGHTLY 180 tablet 0    diclofenac (VOLTAREN) 75 MG EC tablet Take one tablet po daily as needed 90 tablet 0    ketoconazole (NIZORAL) 2 % shampoo Wash scalp 3 times per week. 1 Bottle 5    FLUoxetine (PROZAC) 40 MG capsule       Prenatal Vit-Fe Fumarate-FA (PNV PRENATAL PLUS MULTIVITAMIN) 27-1 MG TABS       Prenatal Vit-DSS-Fe Cbn-FA (VIRT-CHAN GT) 90-1 MG TABS       omeprazole (PRILOSEC) 40 MG capsule Take 40 mg by mouth daily.  KLOR-CON M20 20 MEQ tablet TAKE TWO TABLETS BY MOUTH TWICE A  tablet 3    VIVELLE-DOT 0.05 MG/24HR APPLY 1 PATCH TO SKIN TWICE WEEKLY 8 patch 11    furosemide (LASIX) 20 MG tablet Take 20 mg by mouth 3 times daily.  therapeutic multivitamin-minerals (THERAGRAN-M) tablet Take 1 tablet by mouth daily. No current facility-administered medications for this visit. Goals of current treatment regimen include improvement in pain, restoration of functioning- with focus on improvement in physical performance, general activity, work or disability,emotional distress, health care utilization and  decreased medication consumption.  Will continue to monitor progress towards achieving/maintaining therapeutic goals with special emphasis on  1. Improvement in perceived interfernce  of pain with ADL's. Ability to do home exercises independently. Ability to do household chores indoor and/or outdoor work and social and leisure activities. To increase flexibility/ROM, strength and endurance. Improve psychosocial and physical functioning.- she is showing progression towards this treatment goal with the current regimen. 2. Improving sleep to 6-7 hours a night. Improve mood/ anxiety and depression symptoms such as crying spells, low energy, problems with concentration, motivation. - she is showing progression towards this treatment goal with the current regimen. 3. Reduction of reliance on opioid analgesia/more appropriate opioid use. - she is showing progression towards this treatment goal with the current regimen. Risks and benefits of the medications and other alternative treatments have been/were  discussed with the patient. Any questions on the  common side effects of these medications were also answered. She was advised against drinking alcohol with the narcotic pain medicines, advised against driving or handling machinery when  starting or adjusting the dose of medicines, feeling groggy or drowsy, or if having any cognitive issues related to the current medications. Sheis fully aware of the risk of overdose and death, if medicines are misused and not taken as prescribed. If she develops new symptoms or if the symptoms worsen, she was told to call the office. .  Thank you for allowing me to participate in the care of this patient.     Joy Koo MD.    Cc: Chele Zuñiga MD

## 2021-07-22 ENCOUNTER — OFFICE VISIT (OUTPATIENT)
Dept: PAIN MANAGEMENT | Age: 66
End: 2021-07-22
Payer: MEDICARE

## 2021-07-22 VITALS
DIASTOLIC BLOOD PRESSURE: 77 MMHG | BODY MASS INDEX: 41.34 KG/M2 | TEMPERATURE: 97.1 F | WEIGHT: 226 LBS | SYSTOLIC BLOOD PRESSURE: 116 MMHG | HEART RATE: 83 BPM

## 2021-07-22 DIAGNOSIS — G89.4 CHRONIC PAIN SYNDROME: ICD-10-CM

## 2021-07-22 DIAGNOSIS — M79.7 FIBROMYALGIA: ICD-10-CM

## 2021-07-22 DIAGNOSIS — M17.0 PRIMARY OSTEOARTHRITIS OF BOTH KNEES: ICD-10-CM

## 2021-07-22 DIAGNOSIS — M19.90 ARTHRITIS: ICD-10-CM

## 2021-07-22 DIAGNOSIS — Z51.81 ENCOUNTER FOR THERAPEUTIC DRUG MONITORING: Primary | ICD-10-CM

## 2021-07-22 DIAGNOSIS — M51.37 DEGENERATION OF LUMBAR OR LUMBOSACRAL INTERVERTEBRAL DISC: ICD-10-CM

## 2021-07-22 PROCEDURE — 99213 OFFICE O/P EST LOW 20 MIN: CPT | Performed by: INTERNAL MEDICINE

## 2021-07-22 RX ORDER — OXYCODONE HYDROCHLORIDE 5 MG/1
5 TABLET ORAL EVERY 6 HOURS PRN
Qty: 84 TABLET | Refills: 0 | Status: SHIPPED | OUTPATIENT
Start: 2021-07-22 | End: 2021-08-19 | Stop reason: SDUPTHER

## 2021-07-22 RX ORDER — MORPHINE SULFATE 30 MG/1
30 TABLET, FILM COATED, EXTENDED RELEASE ORAL DAILY
Qty: 28 TABLET | Refills: 0 | Status: SHIPPED | OUTPATIENT
Start: 2021-07-22 | End: 2021-08-19 | Stop reason: SDUPTHER

## 2021-07-22 NOTE — PROGRESS NOTES
Golden Ahumada  1955  <[id number]>      HISTORY OF PRESENT ILLNESS:  Ms. Daina Alejandra is a 74 y.o. female returns for a follow up visit for pain management  She has a diagnosis of   1. Chronic pain syndrome    2. Fibromyalgia    3. Arthritis    4. Primary osteoarthritis of both knees    5. Mood disorder ([address]    6. Degeneration of lumbar or lumbosacral intervertebral disc    7. Primary insomnia    8. Chronic migraine without aura without status migrainosus, not intractable    . She complains of pain in the Neck, low back, bilateral shoulders, arms and legs  She rates the pain 5/10 and describes it as sharp, aching, burning. Current treatment regimen has helped relieve about 50% of the pain. She denies any side effects from the current pain regimen. Patient reports that since the last follow up visit the physical functioning is unchanged, family/social relationships are unchanged, mood is unchanged sleep patterns are unchanged, and that the overall functioning is unchanged. Patient denies misusing/abusing her narcotic pain medications or using any illegal drugs. There are No indicators for possible drug abuse, addiction or diversion problems. Patient states she has been doing fair, pain has been manageable somewhat. She reports she has been traveling and had managed okay. She mentions she is using Ms Contin along with Oxycodone for BTP. She complains her back and knees hurt the most.     ALLERGIES: Patients list of allergies were reviewed     MEDICATIONS: Ms. Daina Alejandra list of medications were reviewed. Her current medications are   Outpatient Medications Prior to Visit   Medication Sig Dispense Refill    gabapentin (NEURONTIN) 600 MG tablet TAKE ONE TABLET BY MOUTH EVERY MORNING, 1 TABLET IN THE AFTERNOON, AND TAKE TWO TABLETS BY MOUTH EVERY EVENING 360 tablet 0    topiramate (TOPAMAX) 25 MG tablet TAKE ONE TABLET BY MOUTH TWICE A DAY 60 tablet 1    amitriptyline (ELAVIL) 25 MG tablet TAKE ONE TO TWO TABLETS BY MOUTH ONCE NIGHTLY 180 tablet 0    diclofenac (VOLTAREN) 75 MG EC tablet Take one tablet po daily as needed 90 tablet 0    ketoconazole (NIZORAL) 2 % shampoo Wash scalp 3 times per week. 1 Bottle 5    FLUoxetine (PROZAC) 40 MG capsule       Prenatal Vit-Fe Fumarate-FA (PNV PRENATAL PLUS MULTIVITAMIN) 27-1 MG TABS       Prenatal Vit-DSS-Fe Cbn-FA (VIRT-CHAN GT) 90-1 MG TABS       omeprazole (PRILOSEC) 40 MG capsule Take 40 mg by mouth daily.  KLOR-CON M20 20 MEQ tablet TAKE TWO TABLETS BY MOUTH TWICE A  tablet 3    VIVELLE-DOT 0.05 MG/24HR APPLY 1 PATCH TO SKIN TWICE WEEKLY 8 patch 11    furosemide (LASIX) 20 MG tablet Take 20 mg by mouth 3 times daily.  therapeutic multivitamin-minerals (THERAGRAN-M) tablet Take 1 tablet by mouth daily. No facility-administered medications prior to visit. REVIEW OF SYSTEMS:    Respiratory: Negative for apnea, chest tightness and shortness of breath or change in baseline breathing. PHYSICAL EXAM:   Nursing note and vitals reviewed. /77   Pulse 83   Temp 97.1 °F (36.2 °C)   Wt 226 lb (102.5 kg)   BMI 41.34 kg/m²   Constitutional: She appears well-developed and well-nourished. No acute distress. Cardiovascular: Normal rate, regular rhythm, normal heart sounds, and does not have murmur. Pulmonary/Chest: Effort normal. No respiratory distress. She does not have wheezes in the lung fields. She has no rales. Neurological/Psychiatric:She is alert and oriented to person, place, and time. Coordination is  normal.  Her mood isAppropriate and affect is Neutral/Euthymic(normal) . Other: + trace of edema   IMPRESSION:   1. Chronic pain syndrome    2. Fibromyalgia    3. Arthritis    4. Primary osteoarthritis of both knees    5.  Degeneration of lumbar or lumbosacral intervertebral disc        PLAN:  Informed verbal consent was obtained  -Continue with current opioid regimen Ms Contin with oxycodone for btp   -She was Sandi Arriola MD

## 2021-08-19 ENCOUNTER — OFFICE VISIT (OUTPATIENT)
Dept: PAIN MANAGEMENT | Age: 66
End: 2021-08-19
Payer: MEDICARE

## 2021-08-19 VITALS
WEIGHT: 226.4 LBS | HEART RATE: 75 BPM | SYSTOLIC BLOOD PRESSURE: 123 MMHG | DIASTOLIC BLOOD PRESSURE: 80 MMHG | TEMPERATURE: 97.7 F | BODY MASS INDEX: 41.41 KG/M2

## 2021-08-19 DIAGNOSIS — M51.37 DEGENERATION OF LUMBAR OR LUMBOSACRAL INTERVERTEBRAL DISC: ICD-10-CM

## 2021-08-19 DIAGNOSIS — M17.0 PRIMARY OSTEOARTHRITIS OF BOTH KNEES: ICD-10-CM

## 2021-08-19 DIAGNOSIS — M19.90 ARTHRITIS: ICD-10-CM

## 2021-08-19 DIAGNOSIS — G89.4 CHRONIC PAIN SYNDROME: ICD-10-CM

## 2021-08-19 DIAGNOSIS — M79.7 FIBROMYALGIA: ICD-10-CM

## 2021-08-19 PROCEDURE — 99213 OFFICE O/P EST LOW 20 MIN: CPT | Performed by: INTERNAL MEDICINE

## 2021-08-19 RX ORDER — OXYCODONE HYDROCHLORIDE 5 MG/1
5 TABLET ORAL EVERY 6 HOURS PRN
Qty: 84 TABLET | Refills: 0 | Status: SHIPPED | OUTPATIENT
Start: 2021-08-19 | End: 2021-09-16 | Stop reason: SDUPTHER

## 2021-08-19 RX ORDER — TOPIRAMATE 25 MG/1
TABLET ORAL
Qty: 60 TABLET | Refills: 1 | Status: SHIPPED | OUTPATIENT
Start: 2021-08-19 | End: 2021-09-16 | Stop reason: SDUPTHER

## 2021-08-19 RX ORDER — MORPHINE SULFATE 30 MG/1
30 TABLET, FILM COATED, EXTENDED RELEASE ORAL DAILY
Qty: 28 TABLET | Refills: 0 | Status: SHIPPED | OUTPATIENT
Start: 2021-08-19 | End: 2021-09-16 | Stop reason: SDUPTHER

## 2021-08-19 NOTE — PROGRESS NOTES
Jose Alberto Song  1955  <[id number]>      HISTORY OF PRESENT ILLNESS:  Ms. Nano Sanchez is a 74 y.o. female returns for a follow up visit for pain management  She has a diagnosis of   1. Chronic pain syndrome    2. Fibromyalgia    3. Primary osteoarthritis of both knees    4. Degeneration of lumbar or lumbosacral intervertebral disc    5. Arthritis    . She complains of pain in the Neck, low back, bilateral eblows, hands,knees, and feet  She rates the pain 6/10 and describes it as sharp. Current treatment regimen has helped relieve about 60% of the pain. She denies any side effects from the current pain regimen. Patient reports that since the last follow up visit the physical functioning is unchanged, family/social relationships are unchanged, mood is unchanged sleep patterns are unchanged, and that the overall functioning is unchanged. Patient denies misusing/abusing her narcotic pain medications or using any illegal drugs. There are No indicators for possible drug abuse, addiction or diversion problems. Patient states she has been doing fair, medication is helping with the pain. She mentions she is using Ms contin with Oxycodone for btp. She denies any side effects with it. She reports she is managing her ADLs and house chores. She states she had labs done with her PCP. She mentions her weight has been stable. ALLERGIES: Patients list of allergies were reviewed     MEDICATIONS: Ms. Nano Sanchez list of medications were reviewed. Her current medications are   Outpatient Medications Prior to Visit   Medication Sig Dispense Refill    ketoconazole (NIZORAL) 2 % shampoo WASH SCALP THREE TIMES PER WEEK 120 mL 3    morphine (MS CONTIN) 30 MG extended release tablet Take 1 tablet by mouth daily for 28 days. 28 tablet 0    oxyCODONE (ROXICODONE) 5 MG immediate release tablet Take 1 tablet by mouth every 6 hours as needed for Pain (max 3 per day) for up to 28 days.  84 tablet 0    topiramate (TOPAMAX) 25 MG tablet TAKE ONE TABLET BY MOUTH TWICE A DAY 60 tablet 1    amitriptyline (ELAVIL) 25 MG tablet TAKE ONE TO TWO TABLETS BY MOUTH ONCE NIGHTLY 180 tablet 0    diclofenac (VOLTAREN) 75 MG EC tablet Take one tablet po daily as needed 90 tablet 0    FLUoxetine (PROZAC) 40 MG capsule       Prenatal Vit-Fe Fumarate-FA (PNV PRENATAL PLUS MULTIVITAMIN) 27-1 MG TABS       Prenatal Vit-DSS-Fe Cbn-FA (VIRT-CHAN GT) 90-1 MG TABS       omeprazole (PRILOSEC) 40 MG capsule Take 40 mg by mouth daily.  KLOR-CON M20 20 MEQ tablet TAKE TWO TABLETS BY MOUTH TWICE A  tablet 3    VIVELLE-DOT 0.05 MG/24HR APPLY 1 PATCH TO SKIN TWICE WEEKLY 8 patch 11    furosemide (LASIX) 20 MG tablet Take 20 mg by mouth 3 times daily.  therapeutic multivitamin-minerals (THERAGRAN-M) tablet Take 1 tablet by mouth daily.  gabapentin (NEURONTIN) 600 MG tablet TAKE ONE TABLET BY MOUTH EVERY MORNING, 1 TABLET IN THE AFTERNOON, AND TAKE TWO TABLETS BY MOUTH EVERY EVENING 360 tablet 0     No facility-administered medications prior to visit. REVIEW OF SYSTEMS:    Respiratory: Negative for apnea, chest tightness and shortness of breath or change in baseline breathing. PHYSICAL EXAM:   Nursing note and vitals reviewed. /80   Pulse 75   Temp 97.7 °F (36.5 °C)   Wt 226 lb 6.4 oz (102.7 kg)   BMI 41.41 kg/m²   Constitutional: She appears well-developed and well-nourished. No acute distress. Cardiovascular: Normal rate, regular rhythm, normal heart sounds, and does not have murmur. Pulmonary/Chest: Effort normal. No respiratory distress. She does not have wheezes in the lung fields. She has no rales. Neurological/Psychiatric:She is alert and oriented to person, place, and time. Coordination is  normal.  Her mood isAppropriate and affect is Neutral/Euthymic(normal) . IMPRESSION:   1. Chronic pain syndrome    2. Fibromyalgia    3. Primary osteoarthritis of both knees    4.  Degeneration of lumbar or lumbosacral intervertebral disc    5. Arthritis        PLAN:  Informed verbal consent was obtained  -Continue with current opioid regimen Ms contin with Oxycodone for btp   -She was advised weight reduction, diet changes- 800-1200 idalia diet, diet diary, exercising, nutritional  consult increased physical activity as tolerated   -Most recent labs were reviewed and are within normal limits.  -Continue with all other adjuvants as before   -Patient's urine drug screen results with GC/MS confirmation were obtained and reviewed and were negative for any illicit drugs. Prescribed medications were within acceptable range. Current Outpatient Medications   Medication Sig Dispense Refill    ketoconazole (NIZORAL) 2 % shampoo WASH SCALP THREE TIMES PER WEEK 120 mL 3    morphine (MS CONTIN) 30 MG extended release tablet Take 1 tablet by mouth daily for 28 days. 28 tablet 0    oxyCODONE (ROXICODONE) 5 MG immediate release tablet Take 1 tablet by mouth every 6 hours as needed for Pain (max 3 per day) for up to 28 days. 84 tablet 0    topiramate (TOPAMAX) 25 MG tablet TAKE ONE TABLET BY MOUTH TWICE A DAY 60 tablet 1    amitriptyline (ELAVIL) 25 MG tablet TAKE ONE TO TWO TABLETS BY MOUTH ONCE NIGHTLY 180 tablet 0    diclofenac (VOLTAREN) 75 MG EC tablet Take one tablet po daily as needed 90 tablet 0    FLUoxetine (PROZAC) 40 MG capsule       Prenatal Vit-Fe Fumarate-FA (PNV PRENATAL PLUS MULTIVITAMIN) 27-1 MG TABS       Prenatal Vit-DSS-Fe Cbn-FA (VIRT-CHAN GT) 90-1 MG TABS       omeprazole (PRILOSEC) 40 MG capsule Take 40 mg by mouth daily.  KLOR-CON M20 20 MEQ tablet TAKE TWO TABLETS BY MOUTH TWICE A  tablet 3    VIVELLE-DOT 0.05 MG/24HR APPLY 1 PATCH TO SKIN TWICE WEEKLY 8 patch 11    furosemide (LASIX) 20 MG tablet Take 20 mg by mouth 3 times daily.  therapeutic multivitamin-minerals (THERAGRAN-M) tablet Take 1 tablet by mouth daily.       gabapentin (NEURONTIN) 600 MG tablet TAKE ONE TABLET BY MOUTH EVERY MORNING, 1 TABLET IN THE AFTERNOON, AND TAKE TWO TABLETS BY MOUTH EVERY EVENING 360 tablet 0     No current facility-administered medications for this visit. I will continue her current medication regimen  which is part of the above treatment schedule. It has been helping with Ms. Harris's chronic  medical problems which for this visit include:   Diagnoses of Chronic pain syndrome, Fibromyalgia, Primary osteoarthritis of both knees, Degeneration of lumbar or lumbosacral intervertebral disc, and Arthritis were pertinent to this visit. Risks and benefits of the medications and other alternative treatments  including no treatment were discussed with the patient. The common side effects of these medications were also explained to the patient. Informed verbal consent was obtained. Goals of current treatment regimen include improvement in pain, restoration of functioning- with focus on improvement in physical performance, general activity, work or disability,emotional distress, health care utilization and  decreased medication consumption. Will continue to monitor progress towards achieving/maintaining therapeutic goals with special emphasis on  1. Improvement in perceived interfernce  of pain with ADL's. Ability to do home exercises independently. Ability to do household chores indoor and/or outdoor work and social and leisure activities. Improve psychosocial and physical functioning. - she is showing progression towards this treatment goal with the current regimen. She was advised against drinking alcohol with the narcotic pain medicines, advised against driving or handling machinery while adjusting the dose of medicines or if having cognitive  issues related to the current medications. Risk of overdose and death, if medicines not taken as prescribed, were also discussed. If the patient develops new symptoms or if the symptoms worsen, the patient should call the office.     While transcribing every attempt was made to maintain the accuracy of the note in terms of it's contents,there may have been some errors made inadvertently. Thank you for allowing me to participate in the care of this patient.     Edgar Whitfield MD.    Cc: Ryan Hemphill MD

## 2021-09-16 ENCOUNTER — OFFICE VISIT (OUTPATIENT)
Dept: PAIN MANAGEMENT | Age: 66
End: 2021-09-16
Payer: MEDICARE

## 2021-09-16 VITALS
DIASTOLIC BLOOD PRESSURE: 71 MMHG | TEMPERATURE: 97.7 F | BODY MASS INDEX: 41.59 KG/M2 | OXYGEN SATURATION: 93 % | SYSTOLIC BLOOD PRESSURE: 128 MMHG | WEIGHT: 227.4 LBS | HEART RATE: 87 BPM

## 2021-09-16 DIAGNOSIS — G89.4 CHRONIC PAIN SYNDROME: ICD-10-CM

## 2021-09-16 DIAGNOSIS — M19.90 ARTHRITIS: ICD-10-CM

## 2021-09-16 DIAGNOSIS — M17.0 PRIMARY OSTEOARTHRITIS OF BOTH KNEES: ICD-10-CM

## 2021-09-16 DIAGNOSIS — M79.7 FIBROMYALGIA: ICD-10-CM

## 2021-09-16 DIAGNOSIS — M51.37 DEGENERATION OF LUMBAR OR LUMBOSACRAL INTERVERTEBRAL DISC: ICD-10-CM

## 2021-09-16 PROCEDURE — 99213 OFFICE O/P EST LOW 20 MIN: CPT | Performed by: INTERNAL MEDICINE

## 2021-09-16 RX ORDER — DICLOFENAC SODIUM 75 MG/1
TABLET, DELAYED RELEASE ORAL
Qty: 90 TABLET | Refills: 0 | Status: SHIPPED | OUTPATIENT
Start: 2021-09-16 | End: 2021-12-10 | Stop reason: SDUPTHER

## 2021-09-16 RX ORDER — OXYCODONE HYDROCHLORIDE 5 MG/1
5 TABLET ORAL EVERY 6 HOURS PRN
Qty: 84 TABLET | Refills: 0 | Status: SHIPPED | OUTPATIENT
Start: 2021-09-16 | End: 2021-10-14 | Stop reason: SDUPTHER

## 2021-09-16 RX ORDER — GABAPENTIN 600 MG/1
TABLET ORAL
Qty: 360 TABLET | Refills: 0 | Status: SHIPPED | OUTPATIENT
Start: 2021-09-16 | End: 2021-12-10 | Stop reason: SDUPTHER

## 2021-09-16 RX ORDER — TOPIRAMATE 25 MG/1
TABLET ORAL
Qty: 60 TABLET | Refills: 1 | Status: SHIPPED | OUTPATIENT
Start: 2021-09-16 | End: 2021-12-10 | Stop reason: SDUPTHER

## 2021-09-16 RX ORDER — AMITRIPTYLINE HYDROCHLORIDE 25 MG/1
TABLET, FILM COATED ORAL
Qty: 180 TABLET | Refills: 0 | Status: SHIPPED | OUTPATIENT
Start: 2021-09-16 | End: 2021-12-10 | Stop reason: SDUPTHER

## 2021-09-16 RX ORDER — MORPHINE SULFATE 30 MG/1
30 TABLET, FILM COATED, EXTENDED RELEASE ORAL DAILY
Qty: 28 TABLET | Refills: 0 | Status: SHIPPED | OUTPATIENT
Start: 2021-09-16 | End: 2021-10-14 | Stop reason: SDUPTHER

## 2021-09-16 NOTE — PROGRESS NOTES
Jan Mera  1955  <[id number]>      HISTORY OF PRESENT ILLNESS:  Ms. Chante Stoddard is a 74 y.o. female returns for a follow up visit for pain management  She has a diagnosis of   1. Chronic pain syndrome    2. Fibromyalgia    3. Primary osteoarthritis of both knees    4. Degeneration of lumbar or lumbosacral intervertebral disc    5. Arthritis    6. Chronic migraine without aura without status migrainosus, not intractable    . She complains of pain in the Neck, low back, bilateral elbows, hands, knees and feet  She rates the pain 6/10 and describes it as sharp, aching. Current treatment regimen has helped relieve about 60% of the pain. She denies any side effects from the current pain regimen. Patient reports that since the last follow up visit the physical functioning is unchanged, family/social relationships are unchanged, mood is unchanged sleep patterns are unchanged, and that the overall functioning is unchanged. Patient denies misusing/abusing her narcotic pain medications or using any illegal drugs. There are No indicators for possible drug abuse, addiction or diversion problems. Patient reports she has been doing fair, pain has been baseline and manageable. She says she is using Oxycodone with Ms Contin. She denies any constipation symptoms. She mentions she is using all the adjuvants along with Elavil. She states her weight has been stable. ALLERGIES: Patients list of allergies were reviewed     MEDICATIONS: Ms. Chante Stoddard list of medications were reviewed. Her current medications are   Outpatient Medications Prior to Visit   Medication Sig Dispense Refill    morphine (MS CONTIN) 30 MG extended release tablet Take 1 tablet by mouth daily for 28 days. 28 tablet 0    oxyCODONE (ROXICODONE) 5 MG immediate release tablet Take 1 tablet by mouth every 6 hours as needed for Pain (max 3 per day) for up to 28 days.  84 tablet 0    topiramate (TOPAMAX) 25 MG tablet TAKE ONE TABLET BY MOUTH TWICE A knees    4. Degeneration of lumbar or lumbosacral intervertebral disc    5. Arthritis        PLAN:  Informed verbal consent was obtained  -[place] was obtained and reviewed  for the last one year and no indicators of drug misuse  were found. Any other controlled substance prescriptions  seen on the record have been accounted for, I am aware of the patient receiving these medications. Atif Duran [place] record will be rechecked as part of office protocol.    -Walking as tolerated 20-30 minutes daily   -Adv Biofeedback, relaxation and meditation techniques. Referral to psychologist for CBT was also discussed with patient   -Continue with Ms Contin with Oxycodone for btp   -Continue with Voltaren and the other adjuvants   -She was advised weight reduction, diet changes- 800-1200 idalia diet, diet diary, exercising, nutritional  consult increased physical activity as tolerated   Current Outpatient Medications   Medication Sig Dispense Refill    morphine (MS CONTIN) 30 MG extended release tablet Take 1 tablet by mouth daily for 28 days. 28 tablet 0    oxyCODONE (ROXICODONE) 5 MG immediate release tablet Take 1 tablet by mouth every 6 hours as needed for Pain (max 3 per day) for up to 28 days. 84 tablet 0    topiramate (TOPAMAX) 25 MG tablet TAKE ONE TABLET BY MOUTH TWICE A DAY 60 tablet 1    ketoconazole (NIZORAL) 2 % shampoo WASH SCALP THREE TIMES PER WEEK 120 mL 3    amitriptyline (ELAVIL) 25 MG tablet TAKE ONE TO TWO TABLETS BY MOUTH ONCE NIGHTLY 180 tablet 0    diclofenac (VOLTAREN) 75 MG EC tablet Take one tablet po daily as needed 90 tablet 0    FLUoxetine (PROZAC) 40 MG capsule       Prenatal Vit-Fe Fumarate-FA (PNV PRENATAL PLUS MULTIVITAMIN) 27-1 MG TABS       Prenatal Vit-DSS-Fe Cbn-FA (VIRT-CHAN GT) 90-1 MG TABS       omeprazole (PRILOSEC) 40 MG capsule Take 40 mg by mouth daily.       KLOR-CON M20 20 MEQ tablet TAKE TWO TABLETS BY MOUTH TWICE A  tablet 3    VIVELLE-DOT 0.05 MG/24HR APPLY 1 PATCH TO SKIN TWICE WEEKLY 8 patch 11    furosemide (LASIX) 20 MG tablet Take 20 mg by mouth 3 times daily.  therapeutic multivitamin-minerals (THERAGRAN-M) tablet Take 1 tablet by mouth daily.  gabapentin (NEURONTIN) 600 MG tablet TAKE ONE TABLET BY MOUTH EVERY MORNING, 1 TABLET IN THE AFTERNOON, AND TAKE TWO TABLETS BY MOUTH EVERY EVENING 360 tablet 0     No current facility-administered medications for this visit. I will continue her current medication regimen  which is part of the above treatment schedule. It has been helping with Ms. Harris's chronic  medical problems which for this visit include:   Diagnoses of Chronic pain syndrome, Fibromyalgia, Primary osteoarthritis of both knees, Degeneration of lumbar or lumbosacral intervertebral disc, Arthritis, and Chronic migraine without aura without status migrainosus, not intractable were pertinent to this visit. Risks and benefits of the medications and other alternative treatments  including no treatment were discussed with the patient. The common side effects of these medications were also explained to the patient. Informed verbal consent was obtained. Goals of current treatment regimen include improvement in pain, restoration of functioning- with focus on improvement in physical performance, general activity, work or disability,emotional distress, health care utilization and  decreased medication consumption. Will continue to monitor progress towards achieving/maintaining therapeutic goals with special emphasis on  1. Improvement in perceived interfernce  of pain with ADL's. Ability to do home exercises independently. Ability to do household chores indoor and/or outdoor work and social and leisure activities. Improve psychosocial and physical functioning. - she is showing progression towards this treatment goal with the current regimen.      She was advised against drinking alcohol with the narcotic pain medicines, advised against driving or handling machinery while adjusting the dose of medicines or if having cognitive  issues related to the current medications. Risk of overdose and death, if medicines not taken as prescribed, were also discussed. If the patient develops new symptoms or if the symptoms worsen, the patient should call the office. While transcribing every attempt was made to maintain the accuracy of the note in terms of it's contents,there may have been some errors made inadvertently. Thank you for allowing me to participate in the care of this patient.     Jono Solano MD.    Cc: Manas Arizmendi MD

## 2021-10-14 ENCOUNTER — OFFICE VISIT (OUTPATIENT)
Dept: PAIN MANAGEMENT | Age: 66
End: 2021-10-14
Payer: MEDICARE

## 2021-10-14 VITALS
TEMPERATURE: 97.7 F | DIASTOLIC BLOOD PRESSURE: 82 MMHG | HEART RATE: 83 BPM | BODY MASS INDEX: 42.65 KG/M2 | OXYGEN SATURATION: 94 % | WEIGHT: 233.2 LBS | SYSTOLIC BLOOD PRESSURE: 134 MMHG

## 2021-10-14 DIAGNOSIS — M51.37 DEGENERATION OF LUMBAR OR LUMBOSACRAL INTERVERTEBRAL DISC: ICD-10-CM

## 2021-10-14 DIAGNOSIS — G89.4 CHRONIC PAIN SYNDROME: ICD-10-CM

## 2021-10-14 DIAGNOSIS — M79.7 FIBROMYALGIA: ICD-10-CM

## 2021-10-14 DIAGNOSIS — M17.0 PRIMARY OSTEOARTHRITIS OF BOTH KNEES: ICD-10-CM

## 2021-10-14 DIAGNOSIS — G43.709 CHRONIC MIGRAINE WITHOUT AURA WITHOUT STATUS MIGRAINOSUS, NOT INTRACTABLE: ICD-10-CM

## 2021-10-14 DIAGNOSIS — M19.90 ARTHRITIS: ICD-10-CM

## 2021-10-14 PROCEDURE — 99214 OFFICE O/P EST MOD 30 MIN: CPT | Performed by: INTERNAL MEDICINE

## 2021-10-14 RX ORDER — OXYCODONE HYDROCHLORIDE 5 MG/1
5 TABLET ORAL EVERY 6 HOURS PRN
Qty: 84 TABLET | Refills: 0 | Status: SHIPPED | OUTPATIENT
Start: 2021-10-14 | End: 2021-11-11 | Stop reason: SDUPTHER

## 2021-10-14 RX ORDER — MORPHINE SULFATE 30 MG/1
30 TABLET, FILM COATED, EXTENDED RELEASE ORAL DAILY
Qty: 28 TABLET | Refills: 0 | Status: SHIPPED | OUTPATIENT
Start: 2021-10-14 | End: 2021-11-11 | Stop reason: SDUPTHER

## 2021-10-14 NOTE — PROGRESS NOTES
Basilia Ivelissemaciej  1955  <[id number]>    HISTORY OF PRESENT ILLNESS:  Ms. Mare Rider is a 74 y.o. female returns for a follow up visit for multiple medical problems. Her  presenting problems are   1. Chronic pain syndrome    2. Fibromyalgia    3. Primary osteoarthritis of both knees    4. Degeneration of lumbar or lumbosacral intervertebral disc    5. Arthritis    6. Chronic migraine without aura without status migrainosus, not intractable    . As per information/history obtained from the PADT(patient assessment and documentation tool) -  She complains of pain in the neck, elbows Bilateral and knees Bilateral with radiation to the hands Bilateral, ankles Bilateral and feet Bilateral She rates the pain 6/10 and describes it as sharp, aching, burning. Pain is made worse by: movement, walking, standing. Current treatment regimen has helped relieve about 60% of the pain. She denies side effects from the current pain regimen. Patient reports that since the last follow up visit the physical functioning is unchanged, family/social relationships are unchanged, mood is unchanged and sleep patterns are unchanged, and that the overall functioning is unchanged. Patient denies neurological bowel or bladder. Patient denies misusing/abusing her narcotic pain medications or using any illegal drugs. There are No indicators for possible drug abuse, addiction or diversion problems. Upon obtaining the medical history from Ms. Mare Rider regarding today's office visit for her presenting problems, patient states she is doing fair, her pain has been tolerable with the medications. She mentions she is using Ms Contin along with Oxycodone for breakthrough pain, she states they help with the pain. Patient denies any constipation symptoms. Ms. Mare Rider is complaining of her back hurting worse than her legs. She mentions she is using Neurontin 2400 mg along with Voltaren.  Patient says she thinks her abdomen hernia is coming back again.  she states that the medications are helping with the headaches  and they are tolerable. Denies nausea, vomiting, sonophobia, photophobia, photopsia, diplopia, vertigo, neck stiffness. Patient states she sleeps well. Has normal sleep latency. Averages about 5-7 hours of sleep a night. Denies any signs of sleep apnea. Feels rested in the AM. Denies any sleep attacks during the day. Medication regimen helps with maintaining/regulating sleep, she is on Elavil. Patient's  subjective report of her mood is fair. she describes occasional symptoms of depression, occasional  irritability and some mood swings. Describes her mood as being neutral and reports some pleasure in her daily activities. Reports  fair  appetite, energy and concentration. Able to function well in different aspects of her daily activities. Denies suicidal or homicidal ideation. Denies any complaints of increased tension, does   Worry sometimes and occasional  irritability  she denies any c/o increased anxiety, No c/o panic attacks or symptoms of PTSD. ALLERGIES/PAST MED/FAM/SOC HISTORY: Ms. Jesús Bucio allergies, past medical, family and social history were reviewed in the chart. Ms. Jesús Bucio current medications are   Outpatient Medications Prior to Visit   Medication Sig Dispense Refill    morphine (MS CONTIN) 30 MG extended release tablet Take 1 tablet by mouth daily for 28 days. 28 tablet 0    oxyCODONE (ROXICODONE) 5 MG immediate release tablet Take 1 tablet by mouth every 6 hours as needed for Pain (max 3 per day) for up to 28 days.  84 tablet 0    topiramate (TOPAMAX) 25 MG tablet TAKE ONE TABLET BY MOUTH TWICE A DAY 60 tablet 1    gabapentin (NEURONTIN) 600 MG tablet TAKE ONE TABLET BY MOUTH EVERY MORNING, 1 TABLET IN THE AFTERNOON, AND TAKE TWO TABLETS BY MOUTH EVERY EVENING 360 tablet 0    amitriptyline (ELAVIL) 25 MG tablet TAKE ONE TO TWO TABLETS BY MOUTH ONCE NIGHTLY 180 tablet 0    diclofenac (VOLTAREN) 75 MG EC tablet Take one tablet po daily as needed 90 tablet 0    ketoconazole (NIZORAL) 2 % shampoo WASH SCALP THREE TIMES PER WEEK 120 mL 3    FLUoxetine (PROZAC) 40 MG capsule       Prenatal Vit-Fe Fumarate-FA (PNV PRENATAL PLUS MULTIVITAMIN) 27-1 MG TABS       Prenatal Vit-DSS-Fe Cbn-FA (VIRT-CHAN GT) 90-1 MG TABS       omeprazole (PRILOSEC) 40 MG capsule Take 40 mg by mouth daily.  KLOR-CON M20 20 MEQ tablet TAKE TWO TABLETS BY MOUTH TWICE A  tablet 3    VIVELLE-DOT 0.05 MG/24HR APPLY 1 PATCH TO SKIN TWICE WEEKLY 8 patch 11    furosemide (LASIX) 20 MG tablet Take 20 mg by mouth 3 times daily.  therapeutic multivitamin-minerals (THERAGRAN-M) tablet Take 1 tablet by mouth daily. No facility-administered medications prior to visit. REVIEW OF SYSTEMS: .   Respiratory: Negative for shortness of breath. Cardiovascular: Negative for chest pain, palpitations  Gastrointestinal: Negative for blood in stool, abdominal distention, nausea, vomiting, abdominal pain, diarrhea,constipation. Neurological: Negative for speech difficulty, weakness and light-headedness, dizziness, tremors, sleepiness  Psychiatric/Behavioral: Negative for suicidal ideas, hallucinations, behavioral problems, self-injury, decreased concentration/cognition, agitation, confusion. PHYSICAL EXAM:   Nursing note and vitals reviewed. /82   Pulse 83   Temp 97.7 °F (36.5 °C) (Temporal)   Wt 233 lb 3.2 oz (105.8 kg)   SpO2 94%   BMI 42.65 kg/m²   General Appearance: Patient is well nourished, well developed, well groomed and in no acute distress. Skin: Skin is warm and dry, good turgor . No rash or lesions noted. She is not diaphoretic. Pulmonary/Chest: Effort normal. No respiratory distress or use of accessory muscles. Auscultation revealing normal air entry. She does not have wheezes in the lung fields. She has no rales.    Cardiovascular: Normal rate, regular rhythm, normal heart sounds, and does not have murmur. Exam reveals no gallop and no friction rub. Musculoskeletal / Extremities: Range of motion is normal. Gait is normal, assistive devices use: none. She exhibits edema: none, and no tenderness. Neurological/Psychiatric:She is alert and oriented to person, place, and time. Coordination is  normal.   Judgement and Insight is normal  Her mood is Appropriate and affect is Neutral/Euthymic(normal) . Her behavior is normal.   thought content normal.        IMPRESSION:     1. Chronic pain syndrome - STABLE: Continue current treatment plan    2. Fibromyalgia  - STABLE: Continue current treatment plan    3. Primary osteoarthritis of both knees  - STABLE: Continue current treatment plan    4. Degeneration of lumbar or lumbosacral intervertebral disc  - STABLE: Continue current treatment plan    5. Arthritis  - STABLE: Continue current treatment plan    6. Chronic migraine without aura without status migrainosus, not intractable  - STABLE: Continue current treatment plan        PLAN:  Informed verbal consent was obtained.  -She was advised weight reduction, diet changes- 800-1200 idalia diet, diet diary, exercising, nutritional  consult increased physical activity as tolerated   -ROM/Stretching exercises as advised   -Continue with Ms Contin along with Oxycodone for btp  -she was advised  to avoid using too many OTC analgesics to control the headaches, avoid chocolates, increased caffeine, cheeses and MSG nitrite containing foods, cigarette smoking.  To avoid bright lights, strong smells and skipping meals.   -Continue with Topamax 25 mg BID   -Continue with Neurontin 2400 mg   -Decrease Voltaren to 1 per day PRN   -she was advised proper sleep hygiene-told to avoid:use of caffeine or other stimulants after noon, alcohol use near bedtime, long or frequent naps during the day, erratic sleep schedule, heavy meals near bedtime, vigorous exercise near bedtime and use of electronic devices near bedtime   -Continue with Elavil     Ms. Nohelia Hernandez will be prescribed  the medications  listed below which are for treatment of her presenting  medical problems which for this visit include:   Diagnoses of Chronic pain syndrome, Fibromyalgia, Primary osteoarthritis of both knees, Degeneration of lumbar or lumbosacral intervertebral disc, Arthritis, and Chronic migraine without aura without status migrainosus, not intractable were pertinent to this visit. Medications/orders associated with this visit:    Current Outpatient Medications   Medication Sig Dispense Refill    morphine (MS CONTIN) 30 MG extended release tablet Take 1 tablet by mouth daily for 28 days. 28 tablet 0    oxyCODONE (ROXICODONE) 5 MG immediate release tablet Take 1 tablet by mouth every 6 hours as needed for Pain (max 3 per day) for up to 28 days. 84 tablet 0    topiramate (TOPAMAX) 25 MG tablet TAKE ONE TABLET BY MOUTH TWICE A DAY 60 tablet 1    gabapentin (NEURONTIN) 600 MG tablet TAKE ONE TABLET BY MOUTH EVERY MORNING, 1 TABLET IN THE AFTERNOON, AND TAKE TWO TABLETS BY MOUTH EVERY EVENING 360 tablet 0    amitriptyline (ELAVIL) 25 MG tablet TAKE ONE TO TWO TABLETS BY MOUTH ONCE NIGHTLY 180 tablet 0    diclofenac (VOLTAREN) 75 MG EC tablet Take one tablet po daily as needed 90 tablet 0    ketoconazole (NIZORAL) 2 % shampoo WASH SCALP THREE TIMES PER WEEK 120 mL 3    FLUoxetine (PROZAC) 40 MG capsule       Prenatal Vit-Fe Fumarate-FA (PNV PRENATAL PLUS MULTIVITAMIN) 27-1 MG TABS       Prenatal Vit-DSS-Fe Cbn-FA (VIRT-CHAN GT) 90-1 MG TABS       omeprazole (PRILOSEC) 40 MG capsule Take 40 mg by mouth daily.  KLOR-CON M20 20 MEQ tablet TAKE TWO TABLETS BY MOUTH TWICE A  tablet 3    VIVELLE-DOT 0.05 MG/24HR APPLY 1 PATCH TO SKIN TWICE WEEKLY 8 patch 11    furosemide (LASIX) 20 MG tablet Take 20 mg by mouth 3 times daily.  therapeutic multivitamin-minerals (THERAGRAN-M) tablet Take 1 tablet by mouth daily.        No current facility-administered medications for this visit. Goals of current treatment regimen include improvement in pain, restoration of functioning- with focus on improvement in physical performance, general activity, work or disability,emotional distress, health care utilization and  decreased medication consumption. Will continue to monitor progress towards achieving/maintaining therapeutic goals with special emphasis on  1. Improvement in perceived interfernce  of pain with ADL's. Ability to do home exercises independently. Ability to do household chores indoor and/or outdoor work and social and leisure activities. To increase flexibility/ROM, strength and endurance. Improve psychosocial and physical functioning.- she is showing progression towards this treatment goal with the current regimen. 2. Improving sleep to 6-7 hours a night. Improve mood/ anxiety and depression symptoms such as crying spells, low energy, problems with concentration, motivation. - she is showing progression towards this treatment goal with the current regimen. 3. Reduction of reliance on opioid analgesia/more appropriate opioid use. - she is showing progression towards this treatment goal with the current regimen. Risks and benefits of the medications and other alternative treatments have been/were  discussed with the patient. Any questions on the  common side effects of these medications were also answered. She was advised against drinking alcohol with the narcotic pain medicines, advised against driving or handling machinery when  starting or adjusting the dose of medicines, feeling groggy or drowsy, or if having any cognitive issues related to the current medications. Sheis fully aware of the risk of overdose and death, if medicines are misused and not taken as prescribed. If she develops new symptoms or if the symptoms worsen, she was told to call the office. .  Thank you for allowing me to participate in the care of this patient.     Delisa Carrillo, MD    Cc:  Dr.Anthony Felicity Bamberger, MD

## 2021-11-11 ENCOUNTER — OFFICE VISIT (OUTPATIENT)
Dept: PAIN MANAGEMENT | Age: 66
End: 2021-11-11
Payer: MEDICARE

## 2021-11-11 VITALS
HEART RATE: 81 BPM | DIASTOLIC BLOOD PRESSURE: 77 MMHG | WEIGHT: 231.6 LBS | OXYGEN SATURATION: 95 % | SYSTOLIC BLOOD PRESSURE: 122 MMHG | BODY MASS INDEX: 42.36 KG/M2

## 2021-11-11 DIAGNOSIS — M17.0 PRIMARY OSTEOARTHRITIS OF BOTH KNEES: ICD-10-CM

## 2021-11-11 DIAGNOSIS — M19.90 ARTHRITIS: ICD-10-CM

## 2021-11-11 DIAGNOSIS — M51.37 DEGENERATION OF LUMBAR OR LUMBOSACRAL INTERVERTEBRAL DISC: ICD-10-CM

## 2021-11-11 DIAGNOSIS — G89.4 CHRONIC PAIN SYNDROME: ICD-10-CM

## 2021-11-11 DIAGNOSIS — M79.7 FIBROMYALGIA: ICD-10-CM

## 2021-11-11 PROCEDURE — 99213 OFFICE O/P EST LOW 20 MIN: CPT | Performed by: INTERNAL MEDICINE

## 2021-11-11 RX ORDER — MORPHINE SULFATE 30 MG/1
30 TABLET, FILM COATED, EXTENDED RELEASE ORAL DAILY
Qty: 28 TABLET | Refills: 0 | Status: SHIPPED | OUTPATIENT
Start: 2021-11-11 | End: 2021-12-10 | Stop reason: SDUPTHER

## 2021-11-11 RX ORDER — OXYCODONE HYDROCHLORIDE 5 MG/1
5 TABLET ORAL EVERY 6 HOURS PRN
Qty: 84 TABLET | Refills: 0 | Status: SHIPPED | OUTPATIENT
Start: 2021-11-11 | End: 2021-12-10 | Stop reason: SDUPTHER

## 2021-11-11 NOTE — PROGRESS NOTES
Sushma Spencerbhavani  1955  <[id number]>    HISTORY OF PRESENT ILLNESS:  Ms. Delmi Manzano is a 74 y.o. female returns for a follow up visit for multiple medical problems. Her current presenting problems are   1. Chronic pain syndrome    2. Degeneration of lumbar or lumbosacral intervertebral disc    3. Fibromyalgia    4. Arthritis    5. Primary osteoarthritis of both knees    6. Chronic migraine without aura without status migrainosus, not intractable    . As per information/history obtained from the PADT(patient assessment and documentation tool) - She complains of pain in the shoulders Bilateral and hands Bilateral with radiation to the hips Right, knees Bilateral, lower leg Bilateral and feet Bilateral She rates the pain 6/10 and describes it as sharp, dull, aching, burning, numbness, pins and needles. Pain is made worse by: nothing. Current treatment regimen has helped relieve about 60% of the pain. She denies side effects from the current pain regimen. Patient reports that since the last follow up visit the physical functioning is unchanged, family/social relationships are unchanged, mood is unchanged and sleep patterns are unchanged, and that the overall functioning is unchanged. Patient denies neurological bowel or bladder. Patient denies misusing/abusing her narcotic pain medications or using any illegal drugs. There are No indicators for possible drug abuse, addiction or diversion problems. Upon obtaining the medical history from Ms. Delmi Manzano regarding today's office visit for her presenting problems, patient states she has been doing fair, her pain has been manageable. Ms. Delmi Manzano mentions she is using Ms Contin along with Oxycodone for  breakthrough pain, it is working okay, she denies any side effects with it. Patient says she is on Topamax along with the Voltaren and other adjuvants. She reports she is managing her ADL's.        ALLERGIES: Patients list of allergies were reviewed MEDICATIONS: Ms. Geronimo Inman list of medications were reviewed. Her current medications are   Outpatient Medications Prior to Visit   Medication Sig Dispense Refill    morphine (MS CONTIN) 30 MG extended release tablet Take 1 tablet by mouth daily for 28 days. 28 tablet 0    oxyCODONE (ROXICODONE) 5 MG immediate release tablet Take 1 tablet by mouth every 6 hours as needed for Pain (max 3 per day) for up to 28 days. 84 tablet 0    topiramate (TOPAMAX) 25 MG tablet TAKE ONE TABLET BY MOUTH TWICE A DAY 60 tablet 1    amitriptyline (ELAVIL) 25 MG tablet TAKE ONE TO TWO TABLETS BY MOUTH ONCE NIGHTLY 180 tablet 0    diclofenac (VOLTAREN) 75 MG EC tablet Take one tablet po daily as needed 90 tablet 0    ketoconazole (NIZORAL) 2 % shampoo WASH SCALP THREE TIMES PER WEEK 120 mL 3    FLUoxetine (PROZAC) 40 MG capsule       Prenatal Vit-Fe Fumarate-FA (PNV PRENATAL PLUS MULTIVITAMIN) 27-1 MG TABS       Prenatal Vit-DSS-Fe Cbn-FA (VIRT-CHAN GT) 90-1 MG TABS       omeprazole (PRILOSEC) 40 MG capsule Take 40 mg by mouth daily.  KLOR-CON M20 20 MEQ tablet TAKE TWO TABLETS BY MOUTH TWICE A  tablet 3    VIVELLE-DOT 0.05 MG/24HR APPLY 1 PATCH TO SKIN TWICE WEEKLY 8 patch 11    furosemide (LASIX) 20 MG tablet Take 20 mg by mouth 3 times daily.  therapeutic multivitamin-minerals (THERAGRAN-M) tablet Take 1 tablet by mouth daily.  gabapentin (NEURONTIN) 600 MG tablet TAKE ONE TABLET BY MOUTH EVERY MORNING, 1 TABLET IN THE AFTERNOON, AND TAKE TWO TABLETS BY MOUTH EVERY EVENING 360 tablet 0     No facility-administered medications prior to visit. REVIEW OF SYSTEMS:    Respiratory: Negative for apnea, chest tightness and shortness of breath or change in baseline breathing. PHYSICAL EXAM:   Nursing note and vitals reviewed. /77   Pulse 81   Wt 231 lb 9.6 oz (105.1 kg)   SpO2 95%   BMI 42.36 kg/m²   Constitutional: She appears well-developed and well-nourished. No acute distress. Cardiovascular: Normal rate, regular rhythm, normal heart sounds, and does not have murmur. Pulmonary/Chest: Effort normal. No respiratory distress. She does not have wheezes in the lung fields. She has no rales. Neurological/Psychiatric:She is alert and oriented to person, place, and time. Coordination is  normal.  Her mood isAppropriate and affect is Neutral/Euthymic(normal) . IMPRESSION:   1. Chronic pain syndrome    2. Degeneration of lumbar or lumbosacral intervertebral disc    3. Fibromyalgia    4. Arthritis    5. Primary osteoarthritis of both knees        PLAN:  Informed verbal consent was obtained  -ROM/Stretching exercises as advised   -She was advised to increase fluids ( 5-7  glasses of fluid daily), limit caffeine, avoid cheese products, increase dietary fiber, increase activity and exercise as tolerated and relax regularly and enjoy meals   -Walking 20 minutes daily as tolerated   -She was advised weight reduction, diet changes- 800-1200 idalia diet, diet diary, exercising, nutritional  consult increased physical activity as tolerated   -Continue with Ms Contin along with Oxycodone 3 per day     Current Outpatient Medications   Medication Sig Dispense Refill    morphine (MS CONTIN) 30 MG extended release tablet Take 1 tablet by mouth daily for 28 days. 28 tablet 0    oxyCODONE (ROXICODONE) 5 MG immediate release tablet Take 1 tablet by mouth every 6 hours as needed for Pain (max 3 per day) for up to 28 days.  84 tablet 0    topiramate (TOPAMAX) 25 MG tablet TAKE ONE TABLET BY MOUTH TWICE A DAY 60 tablet 1    amitriptyline (ELAVIL) 25 MG tablet TAKE ONE TO TWO TABLETS BY MOUTH ONCE NIGHTLY 180 tablet 0    diclofenac (VOLTAREN) 75 MG EC tablet Take one tablet po daily as needed 90 tablet 0    ketoconazole (NIZORAL) 2 % shampoo WASH SCALP THREE TIMES PER WEEK 120 mL 3    FLUoxetine (PROZAC) 40 MG capsule       Prenatal Vit-Fe Fumarate-FA (PNV PRENATAL PLUS MULTIVITAMIN) 27-1 MG TABS leisure activities. Improve psychosocial and physical functioning. - she is showing progression towards this treatment goal with the current regimen. She was advised against drinking alcohol with the narcotic pain medicines, advised against driving or handling machinery while adjusting the dose of medicines or if having cognitive  issues related to the current medications. Risk of overdose and death, if medicines not taken as prescribed, were also discussed. If the patient develops new symptoms or if the symptoms worsen, the patient should call the office. While transcribing every attempt was made to maintain the accuracy of the note in terms of it's contents,there may have been some errors made inadvertently. Thank you for allowing me to participate in the care of this patient.     Orlin Rahman MD.    Cc: Rosemarie Casanova MD

## 2021-11-12 ENCOUNTER — TELEPHONE (OUTPATIENT)
Dept: PAIN MANAGEMENT | Age: 66
End: 2021-11-12

## 2021-12-10 ENCOUNTER — OFFICE VISIT (OUTPATIENT)
Dept: PAIN MANAGEMENT | Age: 66
End: 2021-12-10
Payer: MEDICARE

## 2021-12-10 VITALS
SYSTOLIC BLOOD PRESSURE: 129 MMHG | BODY MASS INDEX: 42.58 KG/M2 | DIASTOLIC BLOOD PRESSURE: 86 MMHG | WEIGHT: 232.8 LBS | HEART RATE: 82 BPM | OXYGEN SATURATION: 95 %

## 2021-12-10 DIAGNOSIS — M17.0 PRIMARY OSTEOARTHRITIS OF BOTH KNEES: ICD-10-CM

## 2021-12-10 DIAGNOSIS — M19.90 ARTHRITIS: ICD-10-CM

## 2021-12-10 DIAGNOSIS — G43.709 CHRONIC MIGRAINE WITHOUT AURA WITHOUT STATUS MIGRAINOSUS, NOT INTRACTABLE: ICD-10-CM

## 2021-12-10 DIAGNOSIS — G89.4 CHRONIC PAIN SYNDROME: ICD-10-CM

## 2021-12-10 DIAGNOSIS — M51.37 DEGENERATION OF LUMBAR OR LUMBOSACRAL INTERVERTEBRAL DISC: ICD-10-CM

## 2021-12-10 DIAGNOSIS — M79.7 FIBROMYALGIA: ICD-10-CM

## 2021-12-10 PROCEDURE — 99213 OFFICE O/P EST LOW 20 MIN: CPT | Performed by: INTERNAL MEDICINE

## 2021-12-10 RX ORDER — OXYCODONE HYDROCHLORIDE 5 MG/1
5 TABLET ORAL EVERY 6 HOURS PRN
Qty: 84 TABLET | Refills: 0 | Status: SHIPPED | OUTPATIENT
Start: 2021-12-10 | End: 2022-01-07 | Stop reason: SDUPTHER

## 2021-12-10 RX ORDER — MORPHINE SULFATE 30 MG/1
30 TABLET, FILM COATED, EXTENDED RELEASE ORAL DAILY
Qty: 28 TABLET | Refills: 0 | Status: SHIPPED | OUTPATIENT
Start: 2021-12-10 | End: 2022-01-07 | Stop reason: SDUPTHER

## 2021-12-10 RX ORDER — TOPIRAMATE 25 MG/1
25 TABLET ORAL 2 TIMES DAILY
Qty: 60 TABLET | Refills: 1 | Status: SHIPPED | OUTPATIENT
Start: 2021-12-10 | End: 2022-01-07 | Stop reason: SDUPTHER

## 2021-12-10 RX ORDER — AMITRIPTYLINE HYDROCHLORIDE 25 MG/1
25-50 TABLET, FILM COATED ORAL NIGHTLY
Qty: 180 TABLET | Refills: 0 | Status: SHIPPED | OUTPATIENT
Start: 2021-12-10 | End: 2022-03-18 | Stop reason: SDUPTHER

## 2021-12-10 RX ORDER — DICLOFENAC SODIUM 75 MG/1
75 TABLET, DELAYED RELEASE ORAL DAILY
Qty: 90 TABLET | Refills: 0 | Status: SHIPPED | OUTPATIENT
Start: 2021-12-10 | End: 2022-03-18 | Stop reason: SDUPTHER

## 2021-12-10 RX ORDER — GABAPENTIN 600 MG/1
TABLET ORAL
Qty: 360 TABLET | Refills: 0 | Status: SHIPPED | OUTPATIENT
Start: 2021-12-10 | End: 2022-03-18 | Stop reason: SDUPTHER

## 2021-12-10 NOTE — PROGRESS NOTES
Erendira Abarca  1955  <[id number]>    HISTORY OF PRESENT ILLNESS:  Ms. Jamel Sampson is a 74 y.o. female returns for a follow up visit for multiple medical problems. Her current presenting problems are   1. Chronic pain syndrome    2. Fibromyalgia    3. Primary osteoarthritis of both knees    4. Degeneration of lumbar or lumbosacral intervertebral disc    5. Chronic migraine without aura without status migrainosus, not intractable    6. Arthritis    . As per information/history obtained from the PADT(patient assessment and documentation tool) - She complains of pain in the lower back with radiation to the shoulders Bilateral, elbows Bilateral, hips Left, upper leg Left, knees Bilateral and feet Bilateral She rates the pain 6/10 and describes it as aching. Pain is made worse by: standing. Current treatment regimen has helped relieve about 60% of the pain. She denies side effects from the current pain regimen. Patient reports that since the last follow up visit the physical functioning is unchanged, family/social relationships are unchanged, mood is unchanged and sleep patterns are unchanged, and that the overall functioning is unchanged. Patient denies neurological bowel or bladder. Patient denies misusing/abusing her narcotic pain medications or using any illegal drugs. There are No indicators for possible drug abuse, addiction or diversion problems. Upon obtaining the medical history from Ms. Jamel Sampson regarding today's office visit for her presenting problems, patient states she has been doing fair, pian has been manageable. She complains of increased pain with changes in weather. Extreme temperatures- cold and damp weather causes increased pain. She says she is using Ms Contin along with Oxycodone 3 per day. She denies any constipation symptoms. She reports she is managing her ADLs.        ALLERGIES: Patients list of allergies were reviewed     MEDICATIONS: Ms. Jamel Sampson list of medications were reviewed. Her current medications are   Outpatient Medications Prior to Visit   Medication Sig Dispense Refill    topiramate (TOPAMAX) 25 MG tablet TAKE ONE TABLET BY MOUTH TWICE A DAY 60 tablet 1    amitriptyline (ELAVIL) 25 MG tablet TAKE ONE TO TWO TABLETS BY MOUTH ONCE NIGHTLY 180 tablet 0    diclofenac (VOLTAREN) 75 MG EC tablet Take one tablet po daily as needed 90 tablet 0    ketoconazole (NIZORAL) 2 % shampoo WASH SCALP THREE TIMES PER WEEK 120 mL 3    FLUoxetine (PROZAC) 40 MG capsule       Prenatal Vit-Fe Fumarate-FA (PNV PRENATAL PLUS MULTIVITAMIN) 27-1 MG TABS       Prenatal Vit-DSS-Fe Cbn-FA (VIRT-CHAN GT) 90-1 MG TABS       omeprazole (PRILOSEC) 40 MG capsule Take 40 mg by mouth daily.  KLOR-CON M20 20 MEQ tablet TAKE TWO TABLETS BY MOUTH TWICE A  tablet 3    VIVELLE-DOT 0.05 MG/24HR APPLY 1 PATCH TO SKIN TWICE WEEKLY 8 patch 11    furosemide (LASIX) 20 MG tablet Take 20 mg by mouth 3 times daily.  therapeutic multivitamin-minerals (THERAGRAN-M) tablet Take 1 tablet by mouth daily.  gabapentin (NEURONTIN) 600 MG tablet TAKE ONE TABLET BY MOUTH EVERY MORNING, 1 TABLET IN THE AFTERNOON, AND TAKE TWO TABLETS BY MOUTH EVERY EVENING 360 tablet 0     No facility-administered medications prior to visit. REVIEW OF SYSTEMS:    Respiratory: Negative for apnea, chest tightness and shortness of breath or change in baseline breathing. PHYSICAL EXAM:   Nursing note and vitals reviewed. /86   Pulse 82   Wt 232 lb 12.8 oz (105.6 kg)   SpO2 95%   BMI 42.58 kg/m²   Constitutional: She appears well-developed and well-nourished. No acute distress. Cardiovascular: Normal rate, regular rhythm, normal heart sounds, and does not have murmur. Pulmonary/Chest: Effort normal. No respiratory distress. She does not have wheezes in the lung fields. She has no rales. Neurological/Psychiatric:She is alert and oriented to person, place, and time.  Coordination is  normal. Her mood isAppropriate and affect is Neutral/Euthymic(normal) . Other: Trace edema     IMPRESSION:   1. Chronic pain syndrome    2. Fibromyalgia    3. Primary osteoarthritis of both knees    4. Degeneration of lumbar or lumbosacral intervertebral disc    5. Chronic migraine without aura without status migrainosus, not intractable    6. Arthritis        PLAN:  Informed verbal consent was obtained  -ROM/Stretching exercises as advised   -She was advised to increase fluids ( 5-7  glasses of fluid daily), limit caffeine, avoid cheese products, increase dietary fiber, increase activity and exercise as tolerated and relax regularly and enjoy meals   -Continue with Ms Contin with Oxycodone for btp.  -Continue with all other adjuvants as before    Current Outpatient Medications   Medication Sig Dispense Refill    topiramate (TOPAMAX) 25 MG tablet TAKE ONE TABLET BY MOUTH TWICE A DAY 60 tablet 1    amitriptyline (ELAVIL) 25 MG tablet TAKE ONE TO TWO TABLETS BY MOUTH ONCE NIGHTLY 180 tablet 0    diclofenac (VOLTAREN) 75 MG EC tablet Take one tablet po daily as needed 90 tablet 0    ketoconazole (NIZORAL) 2 % shampoo WASH SCALP THREE TIMES PER WEEK 120 mL 3    FLUoxetine (PROZAC) 40 MG capsule       Prenatal Vit-Fe Fumarate-FA (PNV PRENATAL PLUS MULTIVITAMIN) 27-1 MG TABS       Prenatal Vit-DSS-Fe Cbn-FA (VIRT-CHAN GT) 90-1 MG TABS       omeprazole (PRILOSEC) 40 MG capsule Take 40 mg by mouth daily.  KLOR-CON M20 20 MEQ tablet TAKE TWO TABLETS BY MOUTH TWICE A  tablet 3    VIVELLE-DOT 0.05 MG/24HR APPLY 1 PATCH TO SKIN TWICE WEEKLY 8 patch 11    furosemide (LASIX) 20 MG tablet Take 20 mg by mouth 3 times daily.  therapeutic multivitamin-minerals (THERAGRAN-M) tablet Take 1 tablet by mouth daily.       gabapentin (NEURONTIN) 600 MG tablet TAKE ONE TABLET BY MOUTH EVERY MORNING, 1 TABLET IN THE AFTERNOON, AND TAKE TWO TABLETS BY MOUTH EVERY EVENING 360 tablet 0     No current facility-administered medications for this visit. I will continue her current medication regimen  which is part of the above treatment schedule. It has been helping with Ms. Harris's chronic  medical problems which for this visit include:   Diagnoses of Chronic pain syndrome, Fibromyalgia, Primary osteoarthritis of both knees, Degeneration of lumbar or lumbosacral intervertebral disc, Chronic migraine without aura without status migrainosus, not intractable, and Arthritis were pertinent to this visit. Risks and benefits of the medications and other alternative treatments  including no treatment were discussed with the patient. The common side effects of these medications were also explained to the patient. Informed verbal consent was obtained. Goals of current treatment regimen include improvement in pain, restoration of functioning- with focus on improvement in physical performance, general activity, work or disability,emotional distress, health care utilization and  decreased medication consumption. Will continue to monitor progress towards achieving/maintaining therapeutic goals with special emphasis on  1. Improvement in perceived interfernce  of pain with ADL's. Ability to do home exercises independently. Ability to do household chores indoor and/or outdoor work and social and leisure activities. Improve psychosocial and physical functioning. - she is showing progression towards this treatment goal with the current regimen. She was advised against drinking alcohol with the narcotic pain medicines, advised against driving or handling machinery while adjusting the dose of medicines or if having cognitive  issues related to the current medications. Risk of overdose and death, if medicines not taken as prescribed, were also discussed. If the patient develops new symptoms or if the symptoms worsen, the patient should call the office.     While transcribing every attempt was made to maintain the accuracy of the note in terms of it's contents,there may have been some errors made inadvertently. Thank you for allowing me to participate in the care of this patient.     Trey Gonzales MD.    Cc: Paul Merritt MD

## 2021-12-27 ENCOUNTER — HOSPITAL ENCOUNTER (OUTPATIENT)
Dept: MAMMOGRAPHY | Age: 66
Discharge: HOME | End: 2021-12-27
Payer: MEDICARE

## 2021-12-27 DIAGNOSIS — Z12.31 ENCOUNTER FOR SCREENING MAMMOGRAM FOR BREAST CANCER: ICD-10-CM

## 2021-12-27 PROCEDURE — 77067 SCR MAMMO BI INCL CAD: CPT

## 2021-12-28 ENCOUNTER — TELEPHONE (OUTPATIENT)
Dept: MAMMOGRAPHY | Age: 66
End: 2021-12-28

## 2022-01-07 ENCOUNTER — OFFICE VISIT (OUTPATIENT)
Dept: PAIN MANAGEMENT | Age: 67
End: 2022-01-07
Payer: MEDICARE

## 2022-01-07 VITALS
HEART RATE: 80 BPM | SYSTOLIC BLOOD PRESSURE: 126 MMHG | OXYGEN SATURATION: 98 % | HEIGHT: 62 IN | BODY MASS INDEX: 41.77 KG/M2 | DIASTOLIC BLOOD PRESSURE: 74 MMHG | WEIGHT: 227 LBS

## 2022-01-07 DIAGNOSIS — M51.37 DEGENERATION OF LUMBAR OR LUMBOSACRAL INTERVERTEBRAL DISC: ICD-10-CM

## 2022-01-07 DIAGNOSIS — M79.7 FIBROMYALGIA: ICD-10-CM

## 2022-01-07 DIAGNOSIS — G89.4 CHRONIC PAIN SYNDROME: ICD-10-CM

## 2022-01-07 DIAGNOSIS — M17.0 PRIMARY OSTEOARTHRITIS OF BOTH KNEES: ICD-10-CM

## 2022-01-07 DIAGNOSIS — M19.90 ARTHRITIS: ICD-10-CM

## 2022-01-07 PROCEDURE — 99213 OFFICE O/P EST LOW 20 MIN: CPT | Performed by: INTERNAL MEDICINE

## 2022-01-07 RX ORDER — MORPHINE SULFATE 30 MG/1
30 TABLET, FILM COATED, EXTENDED RELEASE ORAL DAILY
Qty: 28 TABLET | Refills: 0 | Status: SHIPPED | OUTPATIENT
Start: 2022-01-07 | End: 2022-02-05 | Stop reason: SDUPTHER

## 2022-01-07 RX ORDER — OXYCODONE HYDROCHLORIDE 5 MG/1
5 TABLET ORAL EVERY 6 HOURS PRN
Qty: 84 TABLET | Refills: 0 | Status: SHIPPED | OUTPATIENT
Start: 2022-01-07 | End: 2022-02-05 | Stop reason: SDUPTHER

## 2022-01-07 RX ORDER — TOPIRAMATE 25 MG/1
25 TABLET ORAL 2 TIMES DAILY
Qty: 60 TABLET | Refills: 1 | Status: SHIPPED | OUTPATIENT
Start: 2022-01-07 | End: 2022-03-18 | Stop reason: SDUPTHER

## 2022-01-07 NOTE — PROGRESS NOTES
Archie Connell  1955  <[id number]>    HISTORY OF PRESENT ILLNESS:  Ms. Yung Amin is a 74 y.o. female returns for a follow up visit for multiple medical problems. Her current presenting problems are   1. Chronic pain syndrome    2. Primary osteoarthritis of both knees    3. Chronic migraine without aura without status migrainosus, not intractable    4. Encounter for therapeutic drug monitoring    5. Primary insomnia    6. Mood disorder ([address]    7. Arthritis    8. Degeneration of lumbar or lumbosacral intervertebral disc    9. Fibromyalgia    . As per information/history obtained from the PADT(patient assessment and documentation tool) - She complains of pain in the shoulders Bilateral, elbows Bilateral, hands Bilateral, knees Bilateral and feet Bilateral with radiation to the lower back She rates the pain 6/10 and describes it as sharp. Pain is made worse by: standing. Current treatment regimen has helped relieve about 60% of the pain. She denies side effects from the current pain regimen. Patient reports that since the last follow up visit the physical functioning is unchanged, family/social relationships are unchanged, mood is unchanged and sleep patterns are unchanged, and that the overall functioning is unchanged. Patient denies neurological bowel or bladder. Patient denies misusing/abusing her narcotic pain medications or using any illegal drugs. There are No indicators for possible drug abuse, addiction or diversion problems. Upon obtaining the medical history from Ms. Yung Amin regarding today's office visit for her presenting problems, patient states she has been doing fair, she is [place] with the pain,\" the medications are helping some. Ms. Yung Amin reports she has been managing her ADL's and house chores. Patient reports her weight has been stable. She states she is using Ms Contin along with Oxycodone 3 per day. Patient denies any constipation symptoms.        ALLERGIES: Patients list of allergies were reviewed     MEDICATIONS: Ms. Micheline Cogan list of medications were reviewed. Her current medications are   Outpatient Medications Prior to Visit   Medication Sig Dispense Refill    topiramate (TOPAMAX) 25 MG tablet Take 1 tablet by mouth 2 times daily 60 tablet 1    amitriptyline (ELAVIL) 25 MG tablet Take 1-2 tablets by mouth nightly 180 tablet 0    diclofenac (VOLTAREN) 75 MG EC tablet Take 1 tablet by mouth daily 90 tablet 0    gabapentin (NEURONTIN) 600 MG tablet Take 1 tablet po in Am, take 1 tablet po in afternoon, take 2 tablets po in Pm 360 tablet 0    morphine (MS CONTIN) 30 MG extended release tablet Take 1 tablet by mouth daily for 28 days. 28 tablet 0    oxyCODONE (ROXICODONE) 5 MG immediate release tablet Take 1 tablet by mouth every 6 hours as needed for Pain (max 3 per day) for up to 28 days. 84 tablet 0    ketoconazole (NIZORAL) 2 % shampoo WASH SCALP THREE TIMES PER WEEK 120 mL 3    FLUoxetine (PROZAC) 40 MG capsule       Prenatal Vit-Fe Fumarate-FA (PNV PRENATAL PLUS MULTIVITAMIN) 27-1 MG TABS       Prenatal Vit-DSS-Fe Cbn-FA (VIRT-CHAN GT) 90-1 MG TABS       omeprazole (PRILOSEC) 40 MG capsule Take 40 mg by mouth daily.  KLOR-CON M20 20 MEQ tablet TAKE TWO TABLETS BY MOUTH TWICE A  tablet 3    VIVELLE-DOT 0.05 MG/24HR APPLY 1 PATCH TO SKIN TWICE WEEKLY 8 patch 11    furosemide (LASIX) 20 MG tablet Take 20 mg by mouth 3 times daily.  therapeutic multivitamin-minerals (THERAGRAN-M) tablet Take 1 tablet by mouth daily. No facility-administered medications prior to visit. REVIEW OF SYSTEMS:    Respiratory: Negative for apnea, chest tightness and shortness of breath or change in baseline breathing. PHYSICAL EXAM:   Nursing note and vitals reviewed. /74   Pulse 80   Ht 5' 2\" (1.575 m)   Wt 227 lb (103 kg)   SpO2 98%   BMI 41.52 kg/m²   Constitutional: She appears well-developed and well-nourished. No acute distress.    Cardiovascular: Normal rate, regular rhythm, normal heart sounds, and does not have murmur. Pulmonary/Chest: Effort normal. No respiratory distress. She does not have wheezes in the lung fields. She has no rales. Neurological/Psychiatric:She is alert and oriented to person, place, and time. Coordination is  normal.  Her mood isAppropriate and affect is Neutral/Euthymic(normal) . IMPRESSION:   1. Chronic pain syndrome    2. Primary osteoarthritis of both knees    3. Arthritis    4. Degeneration of lumbar or lumbosacral intervertebral disc    5. Fibromyalgia        PLAN:  Informed verbal consent was obtained  -[place] record was obtained and reviewed  for the last one year and no indicators of drug misuse  were found. Any other controlled substance prescriptions  seen on the record have been accounted for, I am aware of the patient receiving these medications. Buddy Kerr [place] record will be rechecked as part of office protocol. -ROM/Stretching exercises as advised   -Continue with Ms Contin along with Oxycodone 3 per day for btp  -She was advised to increase fluids ( 5-7  glasses of fluid daily), limit caffeine, avoid cheese products, increase dietary fiber, increase activity and exercise as tolerated and relax regularly and enjoy meals      Current Outpatient Medications   Medication Sig Dispense Refill    topiramate (TOPAMAX) 25 MG tablet Take 1 tablet by mouth 2 times daily 60 tablet 1    amitriptyline (ELAVIL) 25 MG tablet Take 1-2 tablets by mouth nightly 180 tablet 0    diclofenac (VOLTAREN) 75 MG EC tablet Take 1 tablet by mouth daily 90 tablet 0    gabapentin (NEURONTIN) 600 MG tablet Take 1 tablet po in Am, take 1 tablet po in afternoon, take 2 tablets po in Pm 360 tablet 0    morphine (MS CONTIN) 30 MG extended release tablet Take 1 tablet by mouth daily for 28 days.  28 tablet 0    oxyCODONE (ROXICODONE) 5 MG immediate release tablet Take 1 tablet by mouth every 6 hours as needed for Pain (max 3 per day) for up to 28 days. 84 tablet 0    ketoconazole (NIZORAL) 2 % shampoo WASH SCALP THREE TIMES PER WEEK 120 mL 3    FLUoxetine (PROZAC) 40 MG capsule       Prenatal Vit-Fe Fumarate-FA (PNV PRENATAL PLUS MULTIVITAMIN) 27-1 MG TABS       Prenatal Vit-DSS-Fe Cbn-FA (VIRT-CHAN GT) 90-1 MG TABS       omeprazole (PRILOSEC) 40 MG capsule Take 40 mg by mouth daily.  KLOR-CON M20 20 MEQ tablet TAKE TWO TABLETS BY MOUTH TWICE A  tablet 3    VIVELLE-DOT 0.05 MG/24HR APPLY 1 PATCH TO SKIN TWICE WEEKLY 8 patch 11    furosemide (LASIX) 20 MG tablet Take 20 mg by mouth 3 times daily.  therapeutic multivitamin-minerals (THERAGRAN-M) tablet Take 1 tablet by mouth daily. No current facility-administered medications for this visit. I will continue her current medication regimen  which is part of the above treatment schedule. It has been helping with Ms. Harris's chronic  medical problems which for this visit include:   Diagnoses of Chronic pain syndrome, Primary osteoarthritis of both knees, Chronic migraine without aura without status migrainosus, not intractable, Encounter for therapeutic drug monitoring, Primary insomnia, Mood disorder ([address] Arthritis, Degeneration of lumbar or lumbosacral intervertebral disc, and Fibromyalgia were pertinent to this visit. Risks and benefits of the medications and other alternative treatments  including no treatment were discussed with the patient. The common side effects of these medications were also explained to the patient. Informed verbal consent was obtained. Goals of current treatment regimen include improvement in pain, restoration of functioning- with focus on improvement in physical performance, general activity, work or disability,emotional distress, health care utilization and  decreased medication consumption. Will continue to monitor progress towards achieving/maintaining therapeutic goals with special emphasis on  1.  Improvement in perceived interfernce  of pain with ADL's. Ability to do home exercises independently. Ability to do household chores indoor and/or outdoor work and social and leisure activities. Improve psychosocial and physical functioning. - she is showing progression towards this treatment goal with the current regimen. She was advised against drinking alcohol with the narcotic pain medicines, advised against driving or handling machinery while adjusting the dose of medicines or if having cognitive  issues related to the current medications. Risk of overdose and death, if medicines not taken as prescribed, were also discussed. If the patient develops new symptoms or if the symptoms worsen, the patient should call the office. While transcribing every attempt was made to maintain the accuracy of the note in terms of it's contents,there may have been some errors made inadvertently. Thank you for allowing me to participate in the care of this patient.     Jose Guerin MD.    Cc: Brent Olivarez MD

## 2022-02-04 DIAGNOSIS — G89.4 CHRONIC PAIN SYNDROME: ICD-10-CM

## 2022-02-04 DIAGNOSIS — M17.0 PRIMARY OSTEOARTHRITIS OF BOTH KNEES: ICD-10-CM

## 2022-02-04 DIAGNOSIS — M79.7 FIBROMYALGIA: ICD-10-CM

## 2022-02-04 DIAGNOSIS — M19.90 ARTHRITIS: ICD-10-CM

## 2022-02-04 DIAGNOSIS — M51.37 DEGENERATION OF LUMBAR OR LUMBOSACRAL INTERVERTEBRAL DISC: ICD-10-CM

## 2022-02-04 NOTE — TELEPHONE ENCOUNTER
Patient needs refill for meds from 2/4 till appt on 2/18 due to weather per [id number] [facility] [address] [facility] [address], [address] [facility] [phone] - f [phone]

## 2022-02-05 RX ORDER — OXYCODONE HYDROCHLORIDE 5 MG/1
5 TABLET ORAL EVERY 6 HOURS PRN
Qty: 39 TABLET | Refills: 0 | Status: SHIPPED | OUTPATIENT
Start: 2022-02-05 | End: 2022-02-18 | Stop reason: SDUPTHER

## 2022-02-05 RX ORDER — MORPHINE SULFATE 30 MG/1
30 TABLET, FILM COATED, EXTENDED RELEASE ORAL DAILY
Qty: 13 TABLET | Refills: 0 | Status: SHIPPED | OUTPATIENT
Start: 2022-02-05 | End: 2022-02-18 | Stop reason: SDUPTHER

## 2022-02-18 ENCOUNTER — OFFICE VISIT (OUTPATIENT)
Dept: PAIN MANAGEMENT | Age: 67
End: 2022-02-18
Payer: MEDICARE

## 2022-02-18 VITALS
SYSTOLIC BLOOD PRESSURE: 125 MMHG | OXYGEN SATURATION: 93 % | DIASTOLIC BLOOD PRESSURE: 83 MMHG | BODY MASS INDEX: 41.7 KG/M2 | WEIGHT: 228 LBS | HEART RATE: 74 BPM

## 2022-02-18 DIAGNOSIS — G89.4 CHRONIC PAIN SYNDROME: ICD-10-CM

## 2022-02-18 DIAGNOSIS — M51.37 DEGENERATION OF LUMBAR OR LUMBOSACRAL INTERVERTEBRAL DISC: ICD-10-CM

## 2022-02-18 DIAGNOSIS — M17.0 PRIMARY OSTEOARTHRITIS OF BOTH KNEES: ICD-10-CM

## 2022-02-18 DIAGNOSIS — M79.7 FIBROMYALGIA: ICD-10-CM

## 2022-02-18 DIAGNOSIS — M19.90 ARTHRITIS: ICD-10-CM

## 2022-02-18 DIAGNOSIS — G43.709 CHRONIC MIGRAINE WITHOUT AURA WITHOUT STATUS MIGRAINOSUS, NOT INTRACTABLE: ICD-10-CM

## 2022-02-18 PROCEDURE — 99213 OFFICE O/P EST LOW 20 MIN: CPT | Performed by: INTERNAL MEDICINE

## 2022-02-18 RX ORDER — OXYCODONE HYDROCHLORIDE 5 MG/1
5 TABLET ORAL EVERY 6 HOURS PRN
Qty: 84 TABLET | Refills: 0 | Status: SHIPPED | OUTPATIENT
Start: 2022-02-18 | End: 2022-03-18 | Stop reason: SDUPTHER

## 2022-02-18 RX ORDER — MORPHINE SULFATE 30 MG/1
30 TABLET, FILM COATED, EXTENDED RELEASE ORAL DAILY
Qty: 28 TABLET | Refills: 0 | Status: SHIPPED | OUTPATIENT
Start: 2022-02-18 | End: 2022-03-18 | Stop reason: SDUPTHER

## 2022-02-18 NOTE — PROGRESS NOTES
Bill Zapata  1955  <[id number]>    HISTORY OF PRESENT ILLNESS:  Ms. Deondre Morton is a 69 y.o. female returns for a follow up visit for multiple medical problems. Her current presenting problems are   1. Chronic pain syndrome    2. Primary osteoarthritis of both knees    3. Degeneration of lumbar or lumbosacral intervertebral disc    4. Chronic migraine without aura without status migrainosus, not intractable    5. Fibromyalgia    6. Arthritis    . As per information/history obtained from the PADT(patient assessment and documentation tool) - She complains of pain in the shoulders Bilateral, elbows Bilateral and hands Bilateral with radiation to the lower back, knees Bilateral and feet Bilateral She rates the pain 6/10 and describes it as sharp, aching. Pain is made worse by: standing. Current treatment regimen has helped relieve about 60% of the pain. She denies side effects from the current pain regimen. Patient reports that since the last follow up visit the physical functioning is unchanged, family/social relationships are unchanged, mood is unchanged and sleep patterns are unchanged, and that the overall functioning is unchanged. Patient denies neurological bowel or bladder. Patient denies misusing/abusing her narcotic pain medications or using any illegal drugs. There are No indicators for possible drug abuse, addiction or diversion problems. Upon obtaining the medical history from Ms. Deondre Morton regarding today's office visit for her presenting problems, patient states she has been doing fair, her pain has been baseline, manageable with the medications. Ms. Deondre Morton mentions she is using Ms Contin along with Oxycodone for btp. Patient reports she has been managing her ADL's and light house chores. Patient reports her weight has been stable. ALLERGIES: Patients list of allergies were reviewed     MEDICATIONS: Ms. Deondre Morton list of medications were reviewed. Her current medications are Outpatient Medications Prior to Visit   Medication Sig Dispense Refill    morphine (MS CONTIN) 30 MG extended release tablet Take 1 tablet by mouth daily for 13 days. 13 tablet 0    oxyCODONE (ROXICODONE) 5 MG immediate release tablet Take 1 tablet by mouth every 6 hours as needed for Pain (max 3 per day) for up to 13 days. 39 tablet 0    topiramate (TOPAMAX) 25 MG tablet Take 1 tablet by mouth 2 times daily 60 tablet 1    amitriptyline (ELAVIL) 25 MG tablet Take 1-2 tablets by mouth nightly 180 tablet 0    diclofenac (VOLTAREN) 75 MG EC tablet Take 1 tablet by mouth daily 90 tablet 0    ketoconazole (NIZORAL) 2 % shampoo WASH SCALP THREE TIMES PER WEEK 120 mL 3    FLUoxetine (PROZAC) 40 MG capsule       Prenatal Vit-Fe Fumarate-FA (PNV PRENATAL PLUS MULTIVITAMIN) 27-1 MG TABS       Prenatal Vit-DSS-Fe Cbn-FA (VIRT-CHAN GT) 90-1 MG TABS       omeprazole (PRILOSEC) 40 MG capsule Take 40 mg by mouth daily.  KLOR-CON M20 20 MEQ tablet TAKE TWO TABLETS BY MOUTH TWICE A  tablet 3    VIVELLE-DOT 0.05 MG/24HR APPLY 1 PATCH TO SKIN TWICE WEEKLY 8 patch 11    furosemide (LASIX) 20 MG tablet Take 20 mg by mouth 3 times daily.  therapeutic multivitamin-minerals (THERAGRAN-M) tablet Take 1 tablet by mouth daily.  gabapentin (NEURONTIN) 600 MG tablet Take 1 tablet po in Am, take 1 tablet po in afternoon, take 2 tablets po in Pm 360 tablet 0     No facility-administered medications prior to visit. REVIEW OF SYSTEMS:    Respiratory: Negative for apnea, chest tightness and shortness of breath or change in baseline breathing. PHYSICAL EXAM:   Nursing note and vitals reviewed. /83   Pulse 74   Wt 228 lb (103.4 kg)   SpO2 93%   BMI 41.70 kg/m²   Constitutional: She appears well-developed and well-nourished. No acute distress. Cardiovascular: Normal rate, regular rhythm, normal heart sounds, and does not have murmur. Pulmonary/Chest: Effort normal. No respiratory distress. She does not have wheezes in the lung fields. She has no rales. Neurological/Psychiatric:She is alert and oriented to person, place, and time. Coordination is  normal.  Her mood isAppropriate and affect is Neutral/Euthymic(normal) . IMPRESSION:   1. Chronic pain syndrome    2. Primary osteoarthritis of both knees    3. Degeneration of lumbar or lumbosacral intervertebral disc    4. Chronic migraine without aura without status migrainosus, not intractable    5. Fibromyalgia    6. Arthritis        PLAN:  Informed verbal consent was obtained  -ROM/Stretching exercises as advised   -She was advised weight reduction, diet changes- 800-1200 idalia diet, diet diary, exercising, nutritional  consult increased physical activity as tolerated   -She was advised to increase fluids ( 5-7  glasses of fluid daily), limit caffeine, avoid cheese products, increase dietary fiber, increase activity and exercise as tolerated and relax regularly and enjoy meals   -Continue with Ms Contin along with Oxycodone for btp  -Continue with all other adjuvant medications as before      Current Outpatient Medications   Medication Sig Dispense Refill    morphine (MS CONTIN) 30 MG extended release tablet Take 1 tablet by mouth daily for 13 days. 13 tablet 0    oxyCODONE (ROXICODONE) 5 MG immediate release tablet Take 1 tablet by mouth every 6 hours as needed for Pain (max 3 per day) for up to 13 days.  39 tablet 0    topiramate (TOPAMAX) 25 MG tablet Take 1 tablet by mouth 2 times daily 60 tablet 1    amitriptyline (ELAVIL) 25 MG tablet Take 1-2 tablets by mouth nightly 180 tablet 0    diclofenac (VOLTAREN) 75 MG EC tablet Take 1 tablet by mouth daily 90 tablet 0    ketoconazole (NIZORAL) 2 % shampoo WASH SCALP THREE TIMES PER WEEK 120 mL 3    FLUoxetine (PROZAC) 40 MG capsule       Prenatal Vit-Fe Fumarate-FA (PNV PRENATAL PLUS MULTIVITAMIN) 27-1 MG TABS       Prenatal Vit-DSS-Fe Cbn-FA (VIRT-CHAN GT) 90-1 MG TABS       omeprazole (PRILOSEC) 40 MG capsule Take 40 mg by mouth daily.  KLOR-CON M20 20 MEQ tablet TAKE TWO TABLETS BY MOUTH TWICE A  tablet 3    VIVELLE-DOT 0.05 MG/24HR APPLY 1 PATCH TO SKIN TWICE WEEKLY 8 patch 11    furosemide (LASIX) 20 MG tablet Take 20 mg by mouth 3 times daily.  therapeutic multivitamin-minerals (THERAGRAN-M) tablet Take 1 tablet by mouth daily.  gabapentin (NEURONTIN) 600 MG tablet Take 1 tablet po in Am, take 1 tablet po in afternoon, take 2 tablets po in Pm 360 tablet 0     No current facility-administered medications for this visit. I will continue her current medication regimen  which is part of the above treatment schedule. It has been helping with Ms. Harris's chronic  medical problems which for this visit include:   Diagnoses of Chronic pain syndrome, Primary osteoarthritis of both knees, Degeneration of lumbar or lumbosacral intervertebral disc, Chronic migraine without aura without status migrainosus, not intractable, Fibromyalgia, and Arthritis were pertinent to this visit. Risks and benefits of the medications and other alternative treatments  including no treatment were discussed with the patient. The common side effects of these medications were also explained to the patient. Informed verbal consent was obtained. Goals of current treatment regimen include improvement in pain, restoration of functioning- with focus on improvement in physical performance, general activity, work or disability,emotional distress, health care utilization and  decreased opioid medication consumption- titrating to the lowest effective dose. Will continue to monitor progress towards achieving/maintaining therapeutic goals with special emphasis on  1. Improvement in perceived interfernce  of pain with ADL's. Ability to do home exercises independently. Ability to do household chores indoor and/or outdoor work and social and leisure activities. Improve psychosocial and physical functioning. - she is showing progression towards this treatment goal with the current regimen. She was advised against drinking alcohol with the narcotic pain medicines, advised against driving or handling machinery while adjusting the dose of medicines or if having cognitive  issues related to the current medications. Risk of overdose and death, if medicines not taken as prescribed, were also discussed. If the patient develops new symptoms or if the symptoms worsen, the patient should call the office. While transcribing every attempt was made to maintain the accuracy of the note in terms of it's contents,there may have been some errors made inadvertently. Thank you for allowing me to participate in the care of this patient.     Cherise Box MD.    Cc: Dell Loco MD

## 2022-02-21 ENCOUNTER — TELEPHONE (OUTPATIENT)
Dept: PAIN MANAGEMENT | Age: 67
End: 2022-02-21

## 2022-03-18 ENCOUNTER — OFFICE VISIT (OUTPATIENT)
Dept: PAIN MANAGEMENT | Age: 67
End: 2022-03-18
Payer: MEDICARE

## 2022-03-18 VITALS
HEART RATE: 81 BPM | SYSTOLIC BLOOD PRESSURE: 118 MMHG | OXYGEN SATURATION: 92 % | BODY MASS INDEX: 41.7 KG/M2 | WEIGHT: 228 LBS | DIASTOLIC BLOOD PRESSURE: 74 MMHG

## 2022-03-18 DIAGNOSIS — M51.37 DEGENERATION OF LUMBAR OR LUMBOSACRAL INTERVERTEBRAL DISC: ICD-10-CM

## 2022-03-18 DIAGNOSIS — M19.90 ARTHRITIS: ICD-10-CM

## 2022-03-18 DIAGNOSIS — M17.0 PRIMARY OSTEOARTHRITIS OF BOTH KNEES: ICD-10-CM

## 2022-03-18 DIAGNOSIS — G89.4 CHRONIC PAIN SYNDROME: ICD-10-CM

## 2022-03-18 DIAGNOSIS — M79.7 FIBROMYALGIA: ICD-10-CM

## 2022-03-18 DIAGNOSIS — G43.709 CHRONIC MIGRAINE WITHOUT AURA WITHOUT STATUS MIGRAINOSUS, NOT INTRACTABLE: ICD-10-CM

## 2022-03-18 PROCEDURE — 99213 OFFICE O/P EST LOW 20 MIN: CPT | Performed by: INTERNAL MEDICINE

## 2022-03-18 RX ORDER — DICLOFENAC SODIUM 75 MG/1
75 TABLET, DELAYED RELEASE ORAL DAILY
Qty: 90 TABLET | Refills: 0 | Status: SHIPPED | OUTPATIENT
Start: 2022-03-18 | End: 2022-05-13 | Stop reason: SDUPTHER

## 2022-03-18 RX ORDER — MORPHINE SULFATE 30 MG/1
30 TABLET, FILM COATED, EXTENDED RELEASE ORAL DAILY
Qty: 28 TABLET | Refills: 0 | Status: SHIPPED | OUTPATIENT
Start: 2022-03-18 | End: 2022-04-15 | Stop reason: SDUPTHER

## 2022-03-18 RX ORDER — GABAPENTIN 600 MG/1
TABLET ORAL
Qty: 360 TABLET | Refills: 0 | Status: SHIPPED | OUTPATIENT
Start: 2022-03-18 | End: 2022-05-13 | Stop reason: SDUPTHER

## 2022-03-18 RX ORDER — OXYCODONE HYDROCHLORIDE 5 MG/1
5 TABLET ORAL EVERY 6 HOURS PRN
Qty: 84 TABLET | Refills: 0 | Status: SHIPPED | OUTPATIENT
Start: 2022-03-18 | End: 2022-04-15 | Stop reason: SDUPTHER

## 2022-03-18 RX ORDER — AMITRIPTYLINE HYDROCHLORIDE 25 MG/1
25-50 TABLET, FILM COATED ORAL NIGHTLY
Qty: 180 TABLET | Refills: 0 | Status: SHIPPED | OUTPATIENT
Start: 2022-03-18 | End: 2022-05-13 | Stop reason: SDUPTHER

## 2022-03-18 RX ORDER — TOPIRAMATE 25 MG/1
25 TABLET ORAL 2 TIMES DAILY
Qty: 60 TABLET | Refills: 1 | Status: SHIPPED | OUTPATIENT
Start: 2022-03-18 | End: 2022-04-15 | Stop reason: SDUPTHER

## 2022-03-18 NOTE — PROGRESS NOTES
Noahjoenathalia Terence  1955  [id number]    HISTORY OF PRESENT ILLNESS:  Ms. Micheline Cogan is a 69 y.o. female returns for a follow up visit for multiple medical problems. Her current presenting problems are   1. Chronic pain syndrome    2. Degeneration of lumbar or lumbosacral intervertebral disc    3. Fibromyalgia    4. Primary osteoarthritis of both knees    5. Chronic migraine without aura without status migrainosus, not intractable    6. Arthritis    . As per information/history obtained from the PADT(patient assessment and documentation tool) - She complains of pain in the neck with radiation to the shoulders Bilateral, elbows Bilateral, knees Bilateral, ankles Bilateral and feet Bilateral She rates the pain 6/10 and describes it as aching. Pain is made worse by: standing. Current treatment regimen has helped relieve about 60% of the pain. She denies side effects from the current pain regimen. Patient reports that since the last follow up visit the physical functioning is unchanged, family/social relationships are unchanged, mood is unchanged and sleep patterns are unchanged, and that the overall functioning is unchanged. Patient denies neurological bowel or bladder. Patient denies misusing/abusing her narcotic pain medications or using any illegal drugs. There are No indicators for possible drug abuse, addiction or diversion problems. Upon obtaining the medical history from Ms. Micheline Cogan regarding today's office visit for her presenting problems,  Patient reports the pain has been baseline and manageable. She denies any cognitive side effects or constipation symptoms. She states she is using Ms Contin along with Oxycodone. She says she I managing her ADLS. She mentions her weight has been stable. ALLERGIES/PAST MED/FAM/SOC HISTORY: Ms. Micheline Cogan allergies, past medical, family and social history were reviewed in the chart.       Ms. Micheline Cogan current medications are   Outpatient Medications Prior to Visit   Medication Sig Dispense Refill    ketoconazole (NIZORAL) 2 % shampoo WASH SCALP THREE TIMES PER WEEK 120 mL 3    FLUoxetine (PROZAC) 40 MG capsule       Prenatal Vit-Fe Fumarate-FA (PNV PRENATAL PLUS MULTIVITAMIN) 27-1 MG TABS       Prenatal Vit-DSS-Fe Cbn-FA (VIRT-CHAN GT) 90-1 MG TABS       omeprazole (PRILOSEC) 40 MG capsule Take 40 mg by mouth daily.  KLOR-CON M20 20 MEQ tablet TAKE TWO TABLETS BY MOUTH TWICE A  tablet 3    VIVELLE-DOT 0.05 MG/24HR APPLY 1 PATCH TO SKIN TWICE WEEKLY 8 patch 11    furosemide (LASIX) 20 MG tablet Take 20 mg by mouth 3 times daily.  therapeutic multivitamin-minerals (THERAGRAN-M) tablet Take 1 tablet by mouth daily.  morphine (MS CONTIN) 30 MG extended release tablet Take 1 tablet by mouth daily for 28 days. 28 tablet 0    oxyCODONE (ROXICODONE) 5 MG immediate release tablet Take 1 tablet by mouth every 6 hours as needed for Pain (max 3 per day) for up to 28 days. 84 tablet 0    topiramate (TOPAMAX) 25 MG tablet Take 1 tablet by mouth 2 times daily 60 tablet 1    amitriptyline (ELAVIL) 25 MG tablet Take 1-2 tablets by mouth nightly 180 tablet 0    diclofenac (VOLTAREN) 75 MG EC tablet Take 1 tablet by mouth daily 90 tablet 0    gabapentin (NEURONTIN) 600 MG tablet Take 1 tablet po in Am, take 1 tablet po in afternoon, take 2 tablets po in Pm 360 tablet 0     No facility-administered medications prior to visit. REVIEW OF SYSTEMS:     Respiratory: Negative for shortness of breath. Cardiovascular: Negative for chest pain, palpitations  Gastrointestinal: Negative for blood in stool, abdominal distention, nausea, vomiting, abdominal pain, diarrhea,constipation. Neurological: Negative for speech difficulty, weakness and light-headedness, dizziness, tremors, sleepiness  Psychiatric/Behavioral: Negative for suicidal ideas, hallucinations, behavioral problems, self-injury, decreased concentration/cognition, agitation, confusion. PHYSICAL EXAM:   Nursing note and vitals reviewed. /74   Pulse 81   Wt 228 lb (103.4 kg)   SpO2 92%   BMI 41.70 kg/m²   General Appearance: Patient is well nourished, well developed, well groomed and in no acute distress. Skin: Skin is warm and dry, good turgor . No rash or lesions noted. She is not diaphoretic. Pulmonary/Chest: Effort normal. No respiratory distress or use of accessory muscles. Auscultation revealing normal air entry. She does not have wheezes in the lung fields. She has no rales. Cardiovascular: Normal rate, regular rhythm, normal heart sounds, and does not have murmur. Exam reveals no gallop and no friction rub. Musculoskeletal / Extremities: Range of motion is normal. Gait is normal, assistive devices use: none. She exhibits edema: none, and no tenderness. Neurological/Psychiatric:She is alert and oriented to person, place, and time. Coordination is  normal.   Judgement and Insight is normal  Her mood is Appropriate and affect is Neutral/Euthymic(normal) . Her behavior is normal.   thought content normal.        IMPRESSION:     1. Chronic pain syndrome    2. Degeneration of lumbar or lumbosacral intervertebral disc    3. Fibromyalgia    4. Primary osteoarthritis of both knees    5. Chronic migraine without aura without status migrainosus, not intractable    6. Arthritis        PLAN:  Informed verbal consent was obtained. -ROM/Stretching exercise as advised   -She was advised weight reduction, diet changes- 800-1200 idalia diet, diet diary, exercising, nutritional  consult increased physical activity as tolerated   -Continue with MsDaphney Contin with Oxycodone for btp   -Walking as tolerated 20 minutes daily   -She was advised to increase fluids ( 5-7  glasses of fluid daily), limit caffeine, avoid cheese products, increase dietary fiber, increase activity and exercise as tolerated and relax regularly and enjoy meals   Ms. Jeovanny Palencia will be prescribed  the medications listed below which are for treatment of her presenting  medical problems which for this visit include:   Diagnoses of Chronic pain syndrome, Degeneration of lumbar or lumbosacral intervertebral disc, Fibromyalgia, Primary osteoarthritis of both knees, Chronic migraine without aura without status migrainosus, not intractable, and Arthritis were pertinent to this visit. Medications/orders associated with this visit:    Current Outpatient Medications   Medication Sig Dispense Refill    morphine (MS CONTIN) 30 MG extended release tablet Take 1 tablet by mouth daily for 28 days. 28 tablet 0    oxyCODONE (ROXICODONE) 5 MG immediate release tablet Take 1 tablet by mouth every 6 hours as needed for Pain (max 3 per day) for up to 28 days. 84 tablet 0    topiramate (TOPAMAX) 25 MG tablet Take 1 tablet by mouth 2 times daily 60 tablet 1    amitriptyline (ELAVIL) 25 MG tablet Take 1-2 tablets by mouth nightly 180 tablet 0    diclofenac (VOLTAREN) 75 MG EC tablet Take 1 tablet by mouth daily 90 tablet 0    gabapentin (NEURONTIN) 600 MG tablet Take 1 tablet po in Am, take 1 tablet po in afternoon, take 2 tablets po in Pm 360 tablet 0    ketoconazole (NIZORAL) 2 % shampoo WASH SCALP THREE TIMES PER WEEK 120 mL 3    FLUoxetine (PROZAC) 40 MG capsule       Prenatal Vit-Fe Fumarate-FA (PNV PRENATAL PLUS MULTIVITAMIN) 27-1 MG TABS       Prenatal Vit-DSS-Fe Cbn-FA (VIRT-CHAN GT) 90-1 MG TABS       omeprazole (PRILOSEC) 40 MG capsule Take 40 mg by mouth daily.  KLOR-CON M20 20 MEQ tablet TAKE TWO TABLETS BY MOUTH TWICE A  tablet 3    VIVELLE-DOT 0.05 MG/24HR APPLY 1 PATCH TO SKIN TWICE WEEKLY 8 patch 11    furosemide (LASIX) 20 MG tablet Take 20 mg by mouth 3 times daily.  therapeutic multivitamin-minerals (THERAGRAN-M) tablet Take 1 tablet by mouth daily. No current facility-administered medications for this visit.         Goals of current treatment regimen include improvement in pain, restoration of functioning- with focus on improvement in physical performance, general activity, work or disability,emotional distress, health care utilization and  decreased medication consumption. Will continue to monitor progress towards achieving/maintaining therapeutic goals with special emphasis on  1. Improvement in perceived interfernce  of pain with ADL's. Ability to do home exercises independently. Ability to do household chores indoor and/or outdoor work and social and leisure activities. To increase flexibility/ROM, strength and endurance. Improve psychosocial and physical functioning.- she is showing progression towards this treatment goal with the current regimen. Risks and benefits of the medications and other alternative treatments have been/were  discussed with the patient. Any questions on the  common side effects of these medications were also answered. She was advised against drinking alcohol with the narcotic pain medicines, advised against driving or handling machinery when  starting or adjusting the dose of medicines, feeling groggy or drowsy, or if having any cognitive issues related to the current medications. Sheis fully aware of the risk of overdose and death, if medicines are misused and not taken as prescribed. If she develops new symptoms or if the symptoms worsen, she was told to call the office. .  Thank you for allowing me to participate in the care of this patient.     Hossein Gaspar MD.    Cc: Jose Wong MD

## 2022-04-15 ENCOUNTER — OFFICE VISIT (OUTPATIENT)
Dept: PAIN MANAGEMENT | Age: 67
End: 2022-04-15
Payer: COMMERCIAL

## 2022-04-15 VITALS
DIASTOLIC BLOOD PRESSURE: 79 MMHG | SYSTOLIC BLOOD PRESSURE: 125 MMHG | OXYGEN SATURATION: 97 % | BODY MASS INDEX: 41.22 KG/M2 | HEART RATE: 78 BPM | HEIGHT: 62 IN | WEIGHT: 224 LBS

## 2022-04-15 DIAGNOSIS — G43.709 CHRONIC MIGRAINE WITHOUT AURA WITHOUT STATUS MIGRAINOSUS, NOT INTRACTABLE: ICD-10-CM

## 2022-04-15 DIAGNOSIS — M79.7 FIBROMYALGIA: ICD-10-CM

## 2022-04-15 DIAGNOSIS — M19.90 ARTHRITIS: ICD-10-CM

## 2022-04-15 DIAGNOSIS — M17.0 PRIMARY OSTEOARTHRITIS OF BOTH KNEES: ICD-10-CM

## 2022-04-15 DIAGNOSIS — Z51.81 ENCOUNTER FOR THERAPEUTIC DRUG MONITORING: ICD-10-CM

## 2022-04-15 DIAGNOSIS — G89.4 CHRONIC PAIN SYNDROME: ICD-10-CM

## 2022-04-15 DIAGNOSIS — M51.37 DEGENERATION OF LUMBAR OR LUMBOSACRAL INTERVERTEBRAL DISC: ICD-10-CM

## 2022-04-15 PROCEDURE — 99213 OFFICE O/P EST LOW 20 MIN: CPT | Performed by: INTERNAL MEDICINE

## 2022-04-15 RX ORDER — OXYCODONE HYDROCHLORIDE 5 MG/1
5 TABLET ORAL EVERY 6 HOURS PRN
Qty: 84 TABLET | Refills: 0 | Status: SHIPPED | OUTPATIENT
Start: 2022-04-15 | End: 2022-05-13 | Stop reason: SDUPTHER

## 2022-04-15 RX ORDER — TOPIRAMATE 25 MG/1
25 TABLET ORAL 2 TIMES DAILY
Qty: 60 TABLET | Refills: 1 | Status: SHIPPED | OUTPATIENT
Start: 2022-04-15 | End: 2022-05-13 | Stop reason: SDUPTHER

## 2022-04-15 RX ORDER — MORPHINE SULFATE 30 MG/1
30 TABLET, FILM COATED, EXTENDED RELEASE ORAL DAILY
Qty: 28 TABLET | Refills: 0 | Status: SHIPPED | OUTPATIENT
Start: 2022-04-15 | End: 2022-05-13 | Stop reason: SDUPTHER

## 2022-04-15 NOTE — PROGRESS NOTES
Aylin Gillis  1955  [id number]      HISTORY OF PRESENT ILLNESS:  Ms. Jaden Munguia is a 69 y.o. female returns for a follow up visit for pain management  She has a diagnosis of   1. Chronic pain syndrome    2. Fibromyalgia    3. Chronic migraine without aura without status migrainosus, not intractable    4. Primary insomnia    5. Encounter for therapeutic drug monitoring    6. Degeneration of lumbar or lumbosacral intervertebral disc    7. Primary osteoarthritis of both knees    8. Arthritis    9. Mood disorder ([address]    . She complains of pain in the neck, low back, with radiatione to bilateral shoulder, elbows, hands , knees and feet  She rates the pain 6/10 and describes it as sharp. Current treatment regimen has helped relieve about 60% of the pain. She denies any side effects from the current pain regimen. Patient reports that since the last follow up visit the physical functioning is unchanged, family/social relationships are unchanged, mood is unchanged sleep patterns are unchanged, and that the overall functioning is unchanged. Patient denies misusing/abusing her narcotic pain medications or using any illegal drugs. There are No indicators for possible drug abuse, addiction or diversion problems. Patient states he is doing fair, pain has been manageable with the medications. He says he is using Ms Contin along with Oxycodone for btp. He denies any side effects with it. He reports his weight has been manageable with the regimen. ALLERGIES: Patients list of allergies were reviewed     MEDICATIONS: Ms. Jaden Munguia list of medications were reviewed. Her current medications are   Outpatient Medications Prior to Visit   Medication Sig Dispense Refill    amitriptyline (ELAVIL) 25 MG tablet Take 1-2 tablets by mouth nightly 180 tablet 0    diclofenac (VOLTAREN) 75 MG EC tablet Take 1 tablet by mouth daily 90 tablet 0    gabapentin (NEURONTIN) 600 MG tablet Take 1 tablet po in Am, take 1 tablet po in afternoon, take 2 tablets po in Pm 360 tablet 0    ketoconazole (NIZORAL) 2 % shampoo WASH SCALP THREE TIMES PER WEEK 120 mL 3    FLUoxetine (PROZAC) 40 MG capsule       Prenatal Vit-Fe Fumarate-FA (PNV PRENATAL PLUS MULTIVITAMIN) 27-1 MG TABS       Prenatal Vit-DSS-Fe Cbn-FA (VIRT-CHAN GT) 90-1 MG TABS       omeprazole (PRILOSEC) 40 MG capsule Take 40 mg by mouth daily.  KLOR-CON M20 20 MEQ tablet TAKE TWO TABLETS BY MOUTH TWICE A  tablet 3    VIVELLE-DOT 0.05 MG/24HR APPLY 1 PATCH TO SKIN TWICE WEEKLY 8 patch 11    furosemide (LASIX) 20 MG tablet Take 20 mg by mouth 3 times daily.  therapeutic multivitamin-minerals (THERAGRAN-M) tablet Take 1 tablet by mouth daily.  morphine (MS CONTIN) 30 MG extended release tablet Take 1 tablet by mouth daily for 28 days. 28 tablet 0    oxyCODONE (ROXICODONE) 5 MG immediate release tablet Take 1 tablet by mouth every 6 hours as needed for Pain (max 3 per day) for up to 28 days. 84 tablet 0    topiramate (TOPAMAX) 25 MG tablet Take 1 tablet by mouth 2 times daily 60 tablet 1     No facility-administered medications prior to visit. REVIEW OF SYSTEMS:    Respiratory: Negative for apnea, chest tightness and shortness of breath or change in baseline breathing. PHYSICAL EXAM:   Nursing note and vitals reviewed. /79   Pulse 78   Ht 5' 2\" (1.575 m)   Wt 224 lb (101.6 kg)   SpO2 97%   Breastfeeding No   BMI 40.97 kg/m²   Constitutional: She appears well-developed and well-nourished. No acute distress. Cardiovascular: Normal rate, regular rhythm, normal heart sounds, and does not have murmur. Pulmonary/Chest: Effort normal. No respiratory distress. She does not have wheezes in the lung fields. She has no rales. Neurological/Psychiatric:She is alert and oriented to person, place, and time. Coordination is  normal.  Her mood isAppropriate and affect is Neutral/Euthymic(normal) . IMPRESSION:   1. Chronic pain syndrome    2. Fibromyalgia    3. Chronic migraine without aura without status migrainosus, not intractable    4. Encounter for therapeutic drug monitoring    5. Degeneration of lumbar or lumbosacral intervertebral disc    6. Primary osteoarthritis of both knees    7. Arthritis        PLAN:  Informed verbal consent was obtained  -[place] record was obtained and reviewed  for the last one year and no indicators of drug misuse  were found. Any other controlled substance prescriptions  seen on the record have been accounted for, I am aware of the patient receiving these medications. [place] record will be rechecked as part of office protocol.    -She was advised weight reduction, diet changes- 800-1200 idalia diet, diet diary, exercising, nutritional  consult increased physical activity as tolerated   -Continue with Ms Contin with Oxycodone for btp 3 per day   -She was advised to increase fluids ( 5-7  glasses of fluid daily), limit caffeine, avoid cheese products, increase dietary fiber, increase activity and exercise as tolerated and relax regularly and enjoy meals   -Walking/Stretching exercises as advised   -Continue with all other adjuvants as before    Current Outpatient Medications   Medication Sig Dispense Refill    morphine (MS CONTIN) 30 MG extended release tablet Take 1 tablet by mouth daily for 28 days. 28 tablet 0    oxyCODONE (ROXICODONE) 5 MG immediate release tablet Take 1 tablet by mouth every 6 hours as needed for Pain (max 3 per day) for up to 28 days.  84 tablet 0    topiramate (TOPAMAX) 25 MG tablet Take 1 tablet by mouth 2 times daily 60 tablet 1    amitriptyline (ELAVIL) 25 MG tablet Take 1-2 tablets by mouth nightly 180 tablet 0    diclofenac (VOLTAREN) 75 MG EC tablet Take 1 tablet by mouth daily 90 tablet 0    gabapentin (NEURONTIN) 600 MG tablet Take 1 tablet po in Am, take 1 tablet po in afternoon, take 2 tablets po in Pm 360 tablet 0    ketoconazole (NIZORAL) 2 % shampoo WASH SCALP THREE TIMES PER WEEK 120 mL 3    FLUoxetine (PROZAC) 40 MG capsule       Prenatal Vit-Fe Fumarate-FA (PNV PRENATAL PLUS MULTIVITAMIN) 27-1 MG TABS       Prenatal Vit-DSS-Fe Cbn-FA (VIRT-CHAN GT) 90-1 MG TABS       omeprazole (PRILOSEC) 40 MG capsule Take 40 mg by mouth daily.  KLOR-CON M20 20 MEQ tablet TAKE TWO TABLETS BY MOUTH TWICE A  tablet 3    VIVELLE-DOT 0.05 MG/24HR APPLY 1 PATCH TO SKIN TWICE WEEKLY 8 patch 11    furosemide (LASIX) 20 MG tablet Take 20 mg by mouth 3 times daily.  therapeutic multivitamin-minerals (THERAGRAN-M) tablet Take 1 tablet by mouth daily. No current facility-administered medications for this visit. I will continue her current medication regimen  which is part of the above treatment schedule. It has been helping with Ms. Harris's chronic  medical problems which for this visit include:   Diagnoses of Chronic pain syndrome, Fibromyalgia, Chronic migraine without aura without status migrainosus, not intractable, Primary insomnia, Encounter for therapeutic drug monitoring, Degeneration of lumbar or lumbosacral intervertebral disc, Primary osteoarthritis of both knees, Arthritis, and Mood disorder ([facility] [address] were pertinent to this visit. Risks and benefits of the medications and other alternative treatments  including no treatment were discussed with the patient. The common side effects of these medications were also explained to the patient. Informed verbal consent was obtained. Goals of current treatment regimen include improvement in pain, restoration of functioning- with focus on improvement in physical performance, general activity, work or disability,emotional distress, health care utilization and  decreased medication consumption. Will continue to monitor progress towards achieving/maintaining therapeutic goals with special emphasis on  1. Improvement in perceived interfernce  of pain with ADL's. Ability to do home exercises independently.  Ability to do household chores indoor and/or outdoor work and social and leisure activities. Improve psychosocial and physical functioning. - she is showing progression towards this treatment goal with the current regimen. She was advised against drinking alcohol with the narcotic pain medicines, advised against driving or handling machinery while adjusting the dose of medicines or if having cognitive  issues related to the current medications. Risk of overdose and death, if medicines not taken as prescribed, were also discussed. If the patient develops new symptoms or if the symptoms worsen, the patient should call the office. While transcribing every attempt was made to maintain the accuracy of the note in terms of it's contents,there may have been some errors made inadvertently. Thank you for allowing me to participate in the care of this patient.     Dina Horvath MD.    Cc: Laurel Alvarado MD

## 2022-05-13 ENCOUNTER — OFFICE VISIT (OUTPATIENT)
Dept: PAIN MANAGEMENT | Age: 67
End: 2022-05-13
Payer: MEDICARE

## 2022-05-13 VITALS
HEIGHT: 62 IN | WEIGHT: 224 LBS | BODY MASS INDEX: 41.22 KG/M2 | HEART RATE: 86 BPM | SYSTOLIC BLOOD PRESSURE: 123 MMHG | OXYGEN SATURATION: 99 % | DIASTOLIC BLOOD PRESSURE: 71 MMHG

## 2022-05-13 DIAGNOSIS — M51.37 DEGENERATION OF LUMBAR OR LUMBOSACRAL INTERVERTEBRAL DISC: ICD-10-CM

## 2022-05-13 DIAGNOSIS — G89.4 CHRONIC PAIN SYNDROME: ICD-10-CM

## 2022-05-13 DIAGNOSIS — M19.90 ARTHRITIS: ICD-10-CM

## 2022-05-13 DIAGNOSIS — M79.7 FIBROMYALGIA: ICD-10-CM

## 2022-05-13 DIAGNOSIS — M17.0 PRIMARY OSTEOARTHRITIS OF BOTH KNEES: ICD-10-CM

## 2022-05-13 PROCEDURE — 99213 OFFICE O/P EST LOW 20 MIN: CPT | Performed by: INTERNAL MEDICINE

## 2022-05-13 RX ORDER — DICLOFENAC SODIUM 75 MG/1
75 TABLET, DELAYED RELEASE ORAL DAILY
Qty: 90 TABLET | Refills: 0 | Status: SHIPPED | OUTPATIENT
Start: 2022-05-13 | End: 2022-08-11 | Stop reason: SDUPTHER

## 2022-05-13 RX ORDER — MORPHINE SULFATE 30 MG/1
30 TABLET, FILM COATED, EXTENDED RELEASE ORAL DAILY
Qty: 28 TABLET | Refills: 0 | Status: SHIPPED | OUTPATIENT
Start: 2022-05-13 | End: 2022-06-17 | Stop reason: SDUPTHER

## 2022-05-13 RX ORDER — GABAPENTIN 600 MG/1
TABLET ORAL
Qty: 360 TABLET | Refills: 0 | Status: SHIPPED | OUTPATIENT
Start: 2022-05-13 | End: 2022-07-15 | Stop reason: SDUPTHER

## 2022-05-13 RX ORDER — TOPIRAMATE 25 MG/1
25 TABLET ORAL 2 TIMES DAILY
Qty: 60 TABLET | Refills: 1 | Status: SHIPPED | OUTPATIENT
Start: 2022-05-13 | End: 2022-06-17 | Stop reason: SDUPTHER

## 2022-05-13 RX ORDER — AMITRIPTYLINE HYDROCHLORIDE 25 MG/1
25-50 TABLET, FILM COATED ORAL NIGHTLY
Qty: 180 TABLET | Refills: 0 | Status: SHIPPED | OUTPATIENT
Start: 2022-05-13 | End: 2022-07-15 | Stop reason: SDUPTHER

## 2022-05-13 RX ORDER — OXYCODONE HYDROCHLORIDE 5 MG/1
5 TABLET ORAL EVERY 6 HOURS PRN
Qty: 84 TABLET | Refills: 0 | Status: SHIPPED | OUTPATIENT
Start: 2022-05-13 | End: 2022-06-17 | Stop reason: SDUPTHER

## 2022-05-13 NOTE — PROGRESS NOTES
Eric Patel  1955  [id number]      HISTORY OF PRESENT ILLNESS:  Ms. Jonathan Bullard is a 69 y.o. female returns for a follow up visit for pain management  She has a diagnosis of   1. Chronic pain syndrome    2. Chronic migraine without aura without status migrainosus, not intractable    3. Degeneration of lumbar or lumbosacral intervertebral disc    4. Arthritis    5. Primary insomnia    6. Fibromyalgia    7. Encounter for therapeutic drug monitoring    8. Primary osteoarthritis of both knees    9. Mood disorder ([facility] [address]    . She complains of pain in the bilateral shoulders, bilateral elbows, lower back, bilateral hands, bilateral ankels, bilateral feet  She rates the pain 6/10 and describes it as sharp. Current treatment regimen has helped relieve about 60% of the pain. She denies any side effects from the current pain regimen. Patient reports that since the last follow up visit the physical functioning is unchanged, family/social relationships are unchanged, mood is unchanged sleep patterns are unchanged, and that the overall functioning is unchanged. Patient denies misusing/abusing her narcotic pain medications or using any illegal drugs. There are No indicators for possible drug abuse, addiction or diversion problems, patient states she has been doing fair, her pain has been manageable with the medications. Ms. Jonathan Bullard mentions she is using Ms Contin along with Oxycodone. Patient denies any constipation symptoms. She mentions her headache symptoms are manageable. Patient reports her weight has been stable. ALLERGIES: Patients list of allergies were reviewed     MEDICATIONS: Ms. Jonathan Bullard list of medications were reviewed. Her current medications are   Outpatient Medications Prior to Visit   Medication Sig Dispense Refill    morphine (MS CONTIN) 30 MG extended release tablet Take 1 tablet by mouth daily for 28 days.  28 tablet 0    oxyCODONE (ROXICODONE) 5 MG immediate release tablet Take 1 tablet by mouth every 6 hours as needed for Pain (max 3 per day) for up to 28 days. 84 tablet 0    topiramate (TOPAMAX) 25 MG tablet Take 1 tablet by mouth 2 times daily 60 tablet 1    amitriptyline (ELAVIL) 25 MG tablet Take 1-2 tablets by mouth nightly 180 tablet 0    diclofenac (VOLTAREN) 75 MG EC tablet Take 1 tablet by mouth daily 90 tablet 0    ketoconazole (NIZORAL) 2 % shampoo WASH SCALP THREE TIMES PER WEEK 120 mL 3    FLUoxetine (PROZAC) 40 MG capsule       Prenatal Vit-Fe Fumarate-FA (PNV PRENATAL PLUS MULTIVITAMIN) 27-1 MG TABS       Prenatal Vit-DSS-Fe Cbn-FA (VIRT-CHAN GT) 90-1 MG TABS       omeprazole (PRILOSEC) 40 MG capsule Take 40 mg by mouth daily.  KLOR-CON M20 20 MEQ tablet TAKE TWO TABLETS BY MOUTH TWICE A  tablet 3    VIVELLE-DOT 0.05 MG/24HR APPLY 1 PATCH TO SKIN TWICE WEEKLY 8 patch 11    furosemide (LASIX) 20 MG tablet Take 20 mg by mouth 3 times daily.  therapeutic multivitamin-minerals (THERAGRAN-M) tablet Take 1 tablet by mouth daily.  gabapentin (NEURONTIN) 600 MG tablet Take 1 tablet po in Am, take 1 tablet po in afternoon, take 2 tablets po in Pm 360 tablet 0     No facility-administered medications prior to visit. REVIEW OF SYSTEMS:    Respiratory: Negative for apnea, chest tightness and shortness of breath or change in baseline breathing. PHYSICAL EXAM:   Nursing note and vitals reviewed. /71   Pulse 86   Ht 5' 2\" (1.575 m)   Wt 224 lb (101.6 kg)   SpO2 99%   BMI 40.97 kg/m²   Constitutional: She appears well-developed and well-nourished. No acute distress. Cardiovascular: Normal rate, regular rhythm, normal heart sounds, and does not have murmur. Pulmonary/Chest: Effort normal. No respiratory distress. She does not have wheezes in the lung fields. She has no rales. Neurological/Psychiatric:She is alert and oriented to person, place, and time.  Coordination is  normal.  Her mood isAppropriate and affect is Neutral/Euthymic(normal) . Her    IMPRESSION:   1. Chronic pain syndrome    2. Degeneration of lumbar or lumbosacral intervertebral disc    3. Arthritis    4. Fibromyalgia    5. Primary osteoarthritis of both knees        PLAN:  Informed verbal consent was obtained  -ROM/Stretching exercises as advised   -She was advised weight reduction, diet changes- 800-1200 idalia diet, diet diary, exercising, nutritional  consult increased physical activity as tolerated   -Walking and Stretching exercises as advised    -She was advised to increase fluids ( 5-7  glasses of fluid daily), limit caffeine, avoid cheese products, increase dietary fiber, increase activity and exercise as tolerated and relax regularly and enjoy meals   -Continue with Ms Contin along with Oxycodone 3 per day for btp  -Urine drug screen with GC/MS for opiates and drugs of abuse was ordered and will follow up on results. Current Outpatient Medications   Medication Sig Dispense Refill    morphine (MS CONTIN) 30 MG extended release tablet Take 1 tablet by mouth daily for 28 days. 28 tablet 0    oxyCODONE (ROXICODONE) 5 MG immediate release tablet Take 1 tablet by mouth every 6 hours as needed for Pain (max 3 per day) for up to 28 days. 84 tablet 0    topiramate (TOPAMAX) 25 MG tablet Take 1 tablet by mouth 2 times daily 60 tablet 1    amitriptyline (ELAVIL) 25 MG tablet Take 1-2 tablets by mouth nightly 180 tablet 0    diclofenac (VOLTAREN) 75 MG EC tablet Take 1 tablet by mouth daily 90 tablet 0    ketoconazole (NIZORAL) 2 % shampoo WASH SCALP THREE TIMES PER WEEK 120 mL 3    FLUoxetine (PROZAC) 40 MG capsule       Prenatal Vit-Fe Fumarate-FA (PNV PRENATAL PLUS MULTIVITAMIN) 27-1 MG TABS       Prenatal Vit-DSS-Fe Cbn-FA (VIRT-CHAN GT) 90-1 MG TABS       omeprazole (PRILOSEC) 40 MG capsule Take 40 mg by mouth daily.       KLOR-CON M20 20 MEQ tablet TAKE TWO TABLETS BY MOUTH TWICE A  tablet 3    VIVELLE-DOT 0.05 MG/24HR APPLY 1 PATCH TO SKIN TWICE WEEKLY 8 patch 11    furosemide (LASIX) 20 MG tablet Take 20 mg by mouth 3 times daily.  therapeutic multivitamin-minerals (THERAGRAN-M) tablet Take 1 tablet by mouth daily.  gabapentin (NEURONTIN) 600 MG tablet Take 1 tablet po in Am, take 1 tablet po in afternoon, take 2 tablets po in Pm 360 tablet 0     No current facility-administered medications for this visit. I will continue her current medication regimen  which is part of the above treatment schedule. It has been helping with Ms. Harris's chronic  medical problems which for this visit include:   Diagnoses of Chronic pain syndrome, Chronic migraine without aura without status migrainosus, not intractable, Degeneration of lumbar or lumbosacral intervertebral disc, Arthritis, Primary insomnia, Fibromyalgia, Encounter for therapeutic drug monitoring, Primary osteoarthritis of both knees, and Mood disorder ([facility] were pertinent to this visit. Risks and benefits of the medications and other alternative treatments  including no treatment were discussed with the patient. The common side effects of these medications were also explained to the patient. Informed verbal consent was obtained. Goals of current treatment regimen include improvement in pain, restoration of functioning- with focus on improvement in physical performance, general activity, work or disability,emotional distress, health care utilization and  decreased medication consumption. Will continue to monitor progress towards achieving/maintaining therapeutic goals with special emphasis on  1. Improvement in perceived interfernce  of pain with ADL's. Ability to do home exercises independently. Ability to do household chores indoor and/or outdoor work and social and leisure activities. Improve psychosocial and physical functioning. - she is showing progression towards this treatment goal with the current regimen.      She was advised against drinking alcohol with the narcotic pain medicines, advised against driving or handling machinery while adjusting the dose of medicines or if having cognitive  issues related to the current medications. Risk of overdose and death, if medicines not taken as prescribed, were also discussed. If the patient develops new symptoms or if the symptoms worsen, the patient should call the office. While transcribing every attempt was made to maintain the accuracy of the note in terms of it's contents,there may have been some errors made inadvertently. Thank you for allowing me to participate in the care of this patient.     Moises Iglesias MD.    Cc: Jay Thompson MD

## 2022-06-17 ENCOUNTER — OFFICE VISIT (OUTPATIENT)
Dept: PAIN MANAGEMENT | Age: 67
End: 2022-06-17
Payer: MEDICARE

## 2022-06-17 VITALS
WEIGHT: 224 LBS | HEIGHT: 62 IN | SYSTOLIC BLOOD PRESSURE: 124 MMHG | HEART RATE: 76 BPM | BODY MASS INDEX: 41.22 KG/M2 | DIASTOLIC BLOOD PRESSURE: 81 MMHG | OXYGEN SATURATION: 96 %

## 2022-06-17 DIAGNOSIS — M51.37 DEGENERATION OF LUMBAR OR LUMBOSACRAL INTERVERTEBRAL DISC: ICD-10-CM

## 2022-06-17 DIAGNOSIS — M17.0 PRIMARY OSTEOARTHRITIS OF BOTH KNEES: ICD-10-CM

## 2022-06-17 DIAGNOSIS — M19.90 ARTHRITIS: ICD-10-CM

## 2022-06-17 DIAGNOSIS — G89.4 CHRONIC PAIN SYNDROME: ICD-10-CM

## 2022-06-17 DIAGNOSIS — M79.7 FIBROMYALGIA: ICD-10-CM

## 2022-06-17 PROCEDURE — 1123F ACP DISCUSS/DSCN MKR DOCD: CPT | Performed by: INTERNAL MEDICINE

## 2022-06-17 PROCEDURE — 99213 OFFICE O/P EST LOW 20 MIN: CPT | Performed by: INTERNAL MEDICINE

## 2022-06-17 RX ORDER — OXYCODONE HYDROCHLORIDE 5 MG/1
5 TABLET ORAL EVERY 6 HOURS PRN
Qty: 84 TABLET | Refills: 0 | Status: SHIPPED | OUTPATIENT
Start: 2022-06-17 | End: 2022-07-15 | Stop reason: SDUPTHER

## 2022-06-17 RX ORDER — TOPIRAMATE 25 MG/1
25 TABLET ORAL 2 TIMES DAILY
Qty: 60 TABLET | Refills: 1 | Status: SHIPPED | OUTPATIENT
Start: 2022-06-17 | End: 2022-07-15 | Stop reason: SDUPTHER

## 2022-06-17 RX ORDER — MORPHINE SULFATE 30 MG/1
30 TABLET, FILM COATED, EXTENDED RELEASE ORAL DAILY
Qty: 28 TABLET | Refills: 0 | Status: SHIPPED | OUTPATIENT
Start: 2022-06-17 | End: 2022-07-15 | Stop reason: SDUPTHER

## 2022-06-17 NOTE — PROGRESS NOTES
Vicente Roger  1955  [id number]      HISTORY OF PRESENT ILLNESS:  Ms. Nicky Galicia is a 69 y.o. female returns for a follow up visit for pain management  She has a diagnosis of   1. Chronic pain syndrome    2. Degeneration of lumbar or lumbosacral intervertebral disc    3. Arthritis    4. Fibromyalgia    5. Primary osteoarthritis of both knees    6. Chronic migraine without aura without status migrainosus, not intractable    . She complains of pain in the bilateral shoulders, bilateral elbows, bilateral hands, bilateral knees, bilateral feet  She rates the pain 6/10 and describes it as sharp, aching, burning, pins and needles. Current treatment regimen has helped relieve about 60% of the pain. She denies any side effects from the current pain regimen. Patient reports that since the last follow up visit the physical functioning is unchanged, family/social relationships are unchanged, mood is unchanged sleep patterns are unchanged, and that the overall functioning is unchanged. Patient denies misusing/abusing her narcotic pain medications or using any illegal drugs. There are No indicators for possible drug abuse, addiction or diversion problems, patient states she has been doing about the same, she is managing okay with the medications. Ms. Nicky Galicia mentions she is using Oxycodone along with Ms Contin and the other adjuvants. Patient says her back is doing okay with the medications but still hurts. Patient states her headache symptoms are manageable. Patient says she has by trying to do her house chores. ALLERGIES: Patients list of allergies were reviewed     MEDICATIONS: Ms. Nicky Galicia list of medications were reviewed. Her current medications are   Outpatient Medications Prior to Visit   Medication Sig Dispense Refill    topiramate (TOPAMAX) 25 MG tablet Take 1 tablet by mouth 2 times daily 60 tablet 1    amitriptyline (ELAVIL) 25 MG tablet Take 1-2 tablets by mouth nightly 180 tablet 0    diclofenac (VOLTAREN) 75 MG EC tablet Take 1 tablet by mouth daily 90 tablet 0    gabapentin (NEURONTIN) 600 MG tablet Take 1 tablet po in Am, take 1 tablet po in afternoon, take 2 tablets po in Pm 360 tablet 0    ketoconazole (NIZORAL) 2 % shampoo WASH SCALP THREE TIMES PER WEEK 120 mL 3    FLUoxetine (PROZAC) 40 MG capsule       Prenatal Vit-Fe Fumarate-FA (PNV PRENATAL PLUS MULTIVITAMIN) 27-1 MG TABS       Prenatal Vit-DSS-Fe Cbn-FA (VIRT-CHAN GT) 90-1 MG TABS       omeprazole (PRILOSEC) 40 MG capsule Take 40 mg by mouth daily.  KLOR-CON M20 20 MEQ tablet TAKE TWO TABLETS BY MOUTH TWICE A  tablet 3    VIVELLE-DOT 0.05 MG/24HR APPLY 1 PATCH TO SKIN TWICE WEEKLY 8 patch 11    furosemide (LASIX) 20 MG tablet Take 20 mg by mouth 3 times daily.  therapeutic multivitamin-minerals (THERAGRAN-M) tablet Take 1 tablet by mouth daily. No facility-administered medications prior to visit. REVIEW OF SYSTEMS:    Respiratory: Negative for apnea, chest tightness and shortness of breath or change in baseline breathing. PHYSICAL EXAM:   Nursing note and vitals reviewed. /81 (Site: Right Upper Arm, Position: Sitting)   Pulse 76   Ht 5' 2\" (1.575 m)   Wt 224 lb (101.6 kg)   SpO2 96%   BMI 40.97 kg/m²   Constitutional: She appears well-developed and well-nourished. No acute distress. Cardiovascular: Normal rate, regular rhythm, normal heart sounds, and does not have murmur. Pulmonary/Chest: Effort normal. No respiratory distress. She does not have wheezes in the lung fields. She has no rales. Neurological/Psychiatric:She is alert and oriented to person, place, and time. Coordination is  normal.  Her mood isAppropriate and affect is Neutral/Euthymic(normal) . Her    IMPRESSION:   1. Chronic pain syndrome    2. Degeneration of lumbar or lumbosacral intervertebral disc    3. Arthritis    4. Fibromyalgia    5.  Primary osteoarthritis of both knees        PLAN:  Informed verbal consent was obtained  -ROM/Stretching exercises as advised   -She was advised weight reduction, diet changes- 800-1200 [place] diet, diet diary, exercising, nutritional  consult increased physical activity as tolerated   -She was advised to increase fluids ( 5-7  glasses of fluid daily), limit caffeine, avoid cheese products, increase dietary fiber, increase activity and exercise as tolerated and relax regularly and enjoy meals   -Continue with Ms Contin along with Oxycodone 3 per day for btp  -Patient's urine drug screen results with GC/MS confirmation were obtained and reviewed and were negative for any illicit drugs. Prescribed medications were within acceptable range. Current Outpatient Medications   Medication Sig Dispense Refill    topiramate (TOPAMAX) 25 MG tablet Take 1 tablet by mouth 2 times daily 60 tablet 1    amitriptyline (ELAVIL) 25 MG tablet Take 1-2 tablets by mouth nightly 180 tablet 0    diclofenac (VOLTAREN) 75 MG EC tablet Take 1 tablet by mouth daily 90 tablet 0    gabapentin (NEURONTIN) 600 MG tablet Take 1 tablet po in Am, take 1 tablet po in afternoon, take 2 tablets po in Pm 360 tablet 0    ketoconazole (NIZORAL) 2 % shampoo WASH SCALP THREE TIMES PER WEEK 120 mL 3    FLUoxetine (PROZAC) 40 MG capsule       Prenatal Vit-Fe Fumarate-FA (PNV PRENATAL PLUS MULTIVITAMIN) 27-1 MG TABS       Prenatal Vit-DSS-Fe Cbn-FA (VIRT-CHAN GT) 90-1 MG TABS       omeprazole (PRILOSEC) 40 MG capsule Take 40 mg by mouth daily.  KLOR-CON M20 20 MEQ tablet TAKE TWO TABLETS BY MOUTH TWICE A  tablet 3    VIVELLE-DOT 0.05 MG/24HR APPLY 1 PATCH TO SKIN TWICE WEEKLY 8 patch 11    furosemide (LASIX) 20 MG tablet Take 20 mg by mouth 3 times daily.  therapeutic multivitamin-minerals (THERAGRAN-M) tablet Take 1 tablet by mouth daily. No current facility-administered medications for this visit.      I will continue her current medication regimen  which is part of the above treatment schedule. It has been helping with Ms. Harris's chronic  medical problems which for this visit include:   Diagnoses of Chronic pain syndrome, Degeneration of lumbar or lumbosacral intervertebral disc, Arthritis, Fibromyalgia, Primary osteoarthritis of both knees, and Chronic migraine without aura without status migrainosus, not intractable were pertinent to this visit. Risks and benefits of the medications and other alternative treatments  including no treatment were discussed with the patient. The common side effects of these medications were also explained to the patient. Informed verbal consent was obtained. Goals of current treatment regimen include improvement in pain, restoration of functioning- with focus on improvement in physical performance, general activity, work or disability,emotional distress, health care utilization and  decreased medication consumption. Will continue to monitor progress towards achieving/maintaining therapeutic goals with special emphasis on  1. Improvement in perceived interfernce  of pain with ADL's. Ability to do home exercises independently. Ability to do household chores indoor and/or outdoor work and social and leisure activities. Improve psychosocial and physical functioning. - she is showing progression towards this treatment goal with the current regimen. She was advised against drinking alcohol with the narcotic pain medicines, advised against driving or handling machinery while adjusting the dose of medicines or if having cognitive  issues related to the current medications. Risk of overdose and death, if medicines not taken as prescribed, were also discussed. If the patient develops new symptoms or if the symptoms worsen, the patient should call the office. While transcribing every attempt was made to maintain the accuracy of the note in terms of it's contents,there may have been some errors made inadvertently.   Thank you for allowing me to participate in the care of this patient.     Dayanara Tenorio MD.    Cc: Moises Small MD

## 2022-06-25 DIAGNOSIS — G89.4 CHRONIC PAIN SYNDROME: ICD-10-CM

## 2022-06-25 DIAGNOSIS — M79.7 FIBROMYALGIA: ICD-10-CM

## 2022-06-25 DIAGNOSIS — M17.0 PRIMARY OSTEOARTHRITIS OF BOTH KNEES: ICD-10-CM

## 2022-06-25 DIAGNOSIS — M51.37 DEGENERATION OF LUMBAR OR LUMBOSACRAL INTERVERTEBRAL DISC: ICD-10-CM

## 2022-06-25 DIAGNOSIS — M19.90 ARTHRITIS: ICD-10-CM

## 2022-06-28 RX ORDER — GABAPENTIN 600 MG/1
TABLET ORAL
Qty: 360 TABLET | Refills: 0 | OUTPATIENT
Start: 2022-06-28

## 2022-07-15 ENCOUNTER — OFFICE VISIT (OUTPATIENT)
Dept: PAIN MANAGEMENT | Age: 67
End: 2022-07-15
Payer: MEDICARE

## 2022-07-15 VITALS
WEIGHT: 221 LBS | HEIGHT: 62 IN | SYSTOLIC BLOOD PRESSURE: 132 MMHG | HEART RATE: 85 BPM | BODY MASS INDEX: 40.67 KG/M2 | DIASTOLIC BLOOD PRESSURE: 82 MMHG | OXYGEN SATURATION: 95 %

## 2022-07-15 DIAGNOSIS — M17.0 PRIMARY OSTEOARTHRITIS OF BOTH KNEES: ICD-10-CM

## 2022-07-15 DIAGNOSIS — M79.7 FIBROMYALGIA: ICD-10-CM

## 2022-07-15 DIAGNOSIS — M19.90 ARTHRITIS: ICD-10-CM

## 2022-07-15 DIAGNOSIS — G89.4 CHRONIC PAIN SYNDROME: ICD-10-CM

## 2022-07-15 DIAGNOSIS — M51.37 DEGENERATION OF LUMBAR OR LUMBOSACRAL INTERVERTEBRAL DISC: ICD-10-CM

## 2022-07-15 PROCEDURE — 99213 OFFICE O/P EST LOW 20 MIN: CPT | Performed by: INTERNAL MEDICINE

## 2022-07-15 PROCEDURE — 1123F ACP DISCUSS/DSCN MKR DOCD: CPT | Performed by: INTERNAL MEDICINE

## 2022-07-15 RX ORDER — OXYCODONE HYDROCHLORIDE 5 MG/1
5 TABLET ORAL EVERY 6 HOURS PRN
Qty: 84 TABLET | Refills: 0 | Status: SHIPPED | OUTPATIENT
Start: 2022-07-15 | End: 2022-08-11 | Stop reason: SDUPTHER

## 2022-07-15 RX ORDER — GABAPENTIN 600 MG/1
TABLET ORAL
Qty: 360 TABLET | Refills: 0 | Status: SHIPPED | OUTPATIENT
Start: 2022-07-15 | End: 2022-10-06 | Stop reason: SDUPTHER

## 2022-07-15 RX ORDER — AMITRIPTYLINE HYDROCHLORIDE 25 MG/1
25-50 TABLET, FILM COATED ORAL NIGHTLY
Qty: 180 TABLET | Refills: 0 | Status: SHIPPED | OUTPATIENT
Start: 2022-07-15 | End: 2022-10-06 | Stop reason: SDUPTHER

## 2022-07-15 RX ORDER — TOPIRAMATE 25 MG/1
25 TABLET ORAL 2 TIMES DAILY
Qty: 60 TABLET | Refills: 1 | Status: SHIPPED | OUTPATIENT
Start: 2022-07-15 | End: 2022-08-11 | Stop reason: SDUPTHER

## 2022-07-15 RX ORDER — MORPHINE SULFATE 30 MG/1
30 TABLET, FILM COATED, EXTENDED RELEASE ORAL DAILY
Qty: 28 TABLET | Refills: 0 | Status: SHIPPED | OUTPATIENT
Start: 2022-07-15 | End: 2022-08-11 | Stop reason: SDUPTHER

## 2022-07-15 NOTE — PROGRESS NOTES
Tyrel Radha  1955  [id number]      HISTORY OF PRESENT ILLNESS:  Ms. Edmundo Zhou is a 69 y.o. female returns for a follow up visit for pain management  She has a diagnosis of   1. Chronic pain syndrome    2. Degeneration of lumbar or lumbosacral intervertebral disc    3. Arthritis    4. Fibromyalgia    5. Primary osteoarthritis of both knees    6. Chronic migraine without aura without status migrainosus, not intractable    7. Encounter for therapeutic drug monitoring    . She complains of pain in the  bilateral shoulders, bilateral hands, bilateral knees, bilateral feet  She rates the pain 6/10 and describes it as aching, burning. Current treatment regimen has helped relieve about 60% of the pain. She denies any side effects from the current pain regimen. Patient reports that since the last follow up visit the physical functioning is unchanged, family/social relationships are unchanged, mood is unchanged sleep patterns are unchanged, and that the overall functioning is unchanged. Patient denies misusing/abusing her narcotic pain medications or using any illegal drugs. There are No indicators for possible drug abuse, addiction or diversion problems. Patient states she has been fair, pain has been manageable with the regime. She says she is using Ms contin along with Oxycodone 3 per day. She denies any constipation symptoms. She reports her weight has been stable. ALLERGIES: Patients list of allergies were reviewed     MEDICATIONS: Ms. Edmundo Zhou list of medications were reviewed. Her current medications are   Outpatient Medications Prior to Visit   Medication Sig Dispense Refill    topiramate (TOPAMAX) 25 MG tablet Take 1 tablet by mouth 2 times daily 60 tablet 1    morphine (MS CONTIN) 30 MG extended release tablet Take 1 tablet by mouth daily for 28 days.  28 tablet 0    oxyCODONE (ROXICODONE) 5 MG immediate release tablet Take 1 tablet by mouth every 6 hours as needed for Pain (max 3 per day) for up to 28 days. 84 tablet 0    amitriptyline (ELAVIL) 25 MG tablet Take 1-2 tablets by mouth nightly 180 tablet 0    diclofenac (VOLTAREN) 75 MG EC tablet Take 1 tablet by mouth daily 90 tablet 0    ketoconazole (NIZORAL) 2 % shampoo WASH SCALP THREE TIMES PER WEEK 120 mL 3    FLUoxetine (PROZAC) 40 MG capsule       Prenatal Vit-Fe Fumarate-FA (PNV PRENATAL PLUS MULTIVITAMIN) 27-1 MG TABS       Prenatal Vit-DSS-Fe Cbn-FA (VIRT-CHAN GT) 90-1 MG TABS       omeprazole (PRILOSEC) 40 MG capsule Take 40 mg by mouth daily. KLOR-CON M20 20 MEQ tablet TAKE TWO TABLETS BY MOUTH TWICE A  tablet 3    VIVELLE-DOT 0.05 MG/24HR APPLY 1 PATCH TO SKIN TWICE WEEKLY 8 patch 11    furosemide (LASIX) 20 MG tablet Take 20 mg by mouth 3 times daily. therapeutic multivitamin-minerals (THERAGRAN-M) tablet Take 1 tablet by mouth daily. gabapentin (NEURONTIN) 600 MG tablet Take 1 tablet po in Am, take 1 tablet po in afternoon, take 2 tablets po in Pm 360 tablet 0     No facility-administered medications prior to visit. REVIEW OF SYSTEMS:    Respiratory: Negative for apnea, chest tightness and shortness of breath or change in baseline breathing. PHYSICAL EXAM:   Nursing note and vitals reviewed. /82 (Site: Left Upper Arm, Position: Sitting)   Pulse 85   Ht 5' 2\" (1.575 m)   Wt 221 lb (100.2 kg)   SpO2 95%   BMI 40.42 kg/m²   Constitutional: She appears well-developed and well-nourished. No acute distress. Cardiovascular: Normal rate, regular rhythm, normal heart sounds, and does not have murmur. Pulmonary/Chest: Effort normal. No respiratory distress. She does not have wheezes in the lung fields. She has no rales. Neurological/Psychiatric:She is alert and oriented to person, place, and time. Coordination is  normal.  Her mood isAppropriate and affect is Neutral/Euthymic(normal) . Other: + trace of edema     IMPRESSION:   1. Chronic pain syndrome    2.  Degeneration of lumbar or multivitamin-minerals (THERAGRAN-M) tablet Take 1 tablet by mouth daily. gabapentin (NEURONTIN) 600 MG tablet Take 1 tablet po in Am, take 1 tablet po in afternoon, take 2 tablets po in Pm 360 tablet 0     No current facility-administered medications for this visit. I will continue her current medication regimen  which is part of the above treatment schedule. It has been helping with Ms. Harris's chronic  medical problems which for this visit include:   Diagnoses of Chronic pain syndrome, Degeneration of lumbar or lumbosacral intervertebral disc, Arthritis, Fibromyalgia, Primary osteoarthritis of both knees, Chronic migraine without aura without status migrainosus, not intractable, and Encounter for therapeutic drug monitoring were pertinent to this visit. Risks and benefits of the medications and other alternative treatments  including no treatment were discussed with the patient. The common side effects of these medications were also explained to the patient. Informed verbal consent was obtained. Goals of current treatment regimen include improvement in pain, restoration of functioning- with focus on improvement in physical performance, general activity, work or disability,emotional distress, health care utilization and  decreased medication consumption. Will continue to monitor progress towards achieving/maintaining therapeutic goals with special emphasis on  1. Improvement in perceived interfernce  of pain with ADL's. Ability to do home exercises independently. Ability to do household chores indoor and/or outdoor work and social and leisure activities. Improve psychosocial and physical functioning. - she is showing progression towards this treatment goal with the current regimen.      She was advised against drinking alcohol with the narcotic pain medicines, advised against driving or handling machinery while adjusting the dose of medicines or if having cognitive  issues related to the current medications. Risk of overdose and death, if medicines not taken as prescribed, were also discussed. If the patient develops new symptoms or if the symptoms worsen, the patient should call the office. While transcribing every attempt was made to maintain the accuracy of the note in terms of it's contents,there may have been some errors made inadvertently. Thank you for allowing me to participate in the care of this patient.     Peewee Mahajan MD.    Cc: Peace Aguilera MD

## 2022-08-11 ENCOUNTER — OFFICE VISIT (OUTPATIENT)
Dept: PAIN MANAGEMENT | Age: 67
End: 2022-08-11
Payer: MEDICARE

## 2022-08-11 VITALS
SYSTOLIC BLOOD PRESSURE: 121 MMHG | DIASTOLIC BLOOD PRESSURE: 74 MMHG | OXYGEN SATURATION: 98 % | HEART RATE: 70 BPM | WEIGHT: 222 LBS | BODY MASS INDEX: 40.6 KG/M2

## 2022-08-11 DIAGNOSIS — F39 MOOD DISORDER (HCC): ICD-10-CM

## 2022-08-11 DIAGNOSIS — F51.01 PRIMARY INSOMNIA: ICD-10-CM

## 2022-08-11 DIAGNOSIS — G89.4 CHRONIC PAIN SYNDROME: ICD-10-CM

## 2022-08-11 DIAGNOSIS — M79.7 FIBROMYALGIA: ICD-10-CM

## 2022-08-11 DIAGNOSIS — M51.37 DEGENERATION OF LUMBAR OR LUMBOSACRAL INTERVERTEBRAL DISC: ICD-10-CM

## 2022-08-11 DIAGNOSIS — G43.709 CHRONIC MIGRAINE WITHOUT AURA WITHOUT STATUS MIGRAINOSUS, NOT INTRACTABLE: ICD-10-CM

## 2022-08-11 DIAGNOSIS — M19.90 ARTHRITIS: ICD-10-CM

## 2022-08-11 DIAGNOSIS — M17.0 PRIMARY OSTEOARTHRITIS OF BOTH KNEES: ICD-10-CM

## 2022-08-11 PROCEDURE — 1123F ACP DISCUSS/DSCN MKR DOCD: CPT | Performed by: INTERNAL MEDICINE

## 2022-08-11 PROCEDURE — 99214 OFFICE O/P EST MOD 30 MIN: CPT | Performed by: INTERNAL MEDICINE

## 2022-08-11 RX ORDER — DICLOFENAC SODIUM 75 MG/1
75 TABLET, DELAYED RELEASE ORAL DAILY
Qty: 90 TABLET | Refills: 0 | Status: SHIPPED | OUTPATIENT
Start: 2022-08-11 | End: 2022-10-06 | Stop reason: SDUPTHER

## 2022-08-11 RX ORDER — OXYCODONE HYDROCHLORIDE 5 MG/1
5 TABLET ORAL EVERY 6 HOURS PRN
Qty: 84 TABLET | Refills: 0 | Status: SHIPPED | OUTPATIENT
Start: 2022-08-11 | End: 2022-09-08 | Stop reason: SDUPTHER

## 2022-08-11 RX ORDER — TOPIRAMATE 25 MG/1
25 TABLET ORAL 2 TIMES DAILY
Qty: 60 TABLET | Refills: 1 | Status: SHIPPED | OUTPATIENT
Start: 2022-08-11 | End: 2022-09-08 | Stop reason: SDUPTHER

## 2022-08-11 RX ORDER — MORPHINE SULFATE 30 MG/1
30 TABLET, FILM COATED, EXTENDED RELEASE ORAL DAILY
Qty: 28 TABLET | Refills: 0 | Status: SHIPPED | OUTPATIENT
Start: 2022-08-11 | End: 2022-09-08 | Stop reason: SDUPTHER

## 2022-08-11 NOTE — PROGRESS NOTES
Erendira Abarca  1955  [id number]    HISTORY OF PRESENT ILLNESS:  Ms. Jamel Sampson is a 69 y.o. female returns for a follow up visit for multiple medical problems. Her  presenting problems are   1. Chronic pain syndrome    2. Degeneration of lumbar or lumbosacral intervertebral disc    3. Arthritis    4. Fibromyalgia    5. Primary osteoarthritis of both knees    6. Chronic migraine without aura without status migrainosus, not intractable    7. Mood disorder ([address]    8. Primary insomnia    9. Encounter for long-term (current) use of high-risk medication    . As per information/history obtained from the PADT(patient assessment and documentation tool) -  She complains of pain in the shoulders Bilateral, elbows Bilateral, knees Bilateral, ankles Bilateral, and feet Bilateral She rates the pain 6/10 and describes it as sharp, aching. Pain is made worse by: movement, walking, standing. Current treatment regimen has helped relieve about 60% of the pain. She denies side effects from the current pain regimen. Patient reports that since the last follow up visit the physical functioning is unchanged, family/social relationships are unchanged, mood is unchanged and sleep patterns are unchanged, and that the overall functioning is unchanged. Patient denies neurological bowel or bladder. Patient denies misusing/abusing her narcotic pain medications or using any illegal drugs. There are No indicators for possible drug abuse, addiction or diversion problems. Upon obtaining the medical history from Ms. Jamel Sampson regarding today's office visit for her presenting problems, patient states she has been doing about the same, she is managing with the medications. Ms. Jamel Sampson states she is getting some increase headaches, it comes and goes, she states she is using Topamax still. She complains of increased pain with changes in weather. Extreme temperatures- cold and damp weather causes increased pain.  Patient states her sleep is fair. Has fairly normal sleep latency. Averages about 4-6 hours of sleep a night. Denies any signs of sleep apnea. Feels somewhat rested in the morning, she is on Elavil. Patient states her back and legs are hurting about the same. Patient's  subjective report of her mood is fair. she describes occasional symptoms of depression, occasional  irritability and some mood swings. Describes her mood as being neutral and reports some pleasure in her daily activities. Reports  fair  appetite, energy and concentration. Able to function well in different aspects of her daily activities. Denies suicidal or homicidal ideation. Denies any complaints of increased tension, does   Worry sometimes and occasional  irritability  she denies any c/o increased anxiety, No c/o panic attacks or symptoms of PTSD, she is on Prozac 40 mg. Patient reports her weight has been stable. Patient denies any GERD symptoms. ALLERGIES/PAST MED/FAM/SOC HISTORY: Ms. Edmundo Zhou allergies, past medical, family and social history were reviewed in the chart. Ms. Edmundo Zhou current medications are   Outpatient Medications Prior to Visit   Medication Sig Dispense Refill    topiramate (TOPAMAX) 25 MG tablet Take 1 tablet by mouth in the morning and 1 tablet before bedtime. 60 tablet 1    morphine (MS CONTIN) 30 MG extended release tablet Take 1 tablet by mouth in the morning for 28 days. 28 tablet 0    oxyCODONE (ROXICODONE) 5 MG immediate release tablet Take 1 tablet by mouth every 6 hours as needed for Pain (max 3 per day) for up to 28 days.  84 tablet 0    amitriptyline (ELAVIL) 25 MG tablet Take 1-2 tablets by mouth nightly 180 tablet 0    gabapentin (NEURONTIN) 600 MG tablet Take 1 tablet po in Am, take 1 tablet po in afternoon, take 2 tablets po in Pm 360 tablet 0    diclofenac (VOLTAREN) 75 MG EC tablet Take 1 tablet by mouth daily 90 tablet 0    ketoconazole (NIZORAL) 2 % shampoo WASH SCALP THREE TIMES PER WEEK 120 mL 3    FLUoxetine (PROZAC) 40 MG capsule       Prenatal Vit-Fe Fumarate-FA (PNV PRENATAL PLUS MULTIVITAMIN) 27-1 MG TABS       Prenatal Vit-DSS-Fe Cbn-FA (VIRT-CHAN GT) 90-1 MG TABS       omeprazole (PRILOSEC) 40 MG capsule Take 40 mg by mouth daily. KLOR-CON M20 20 MEQ tablet TAKE TWO TABLETS BY MOUTH TWICE A  tablet 3    VIVELLE-DOT 0.05 MG/24HR APPLY 1 PATCH TO SKIN TWICE WEEKLY 8 patch 11    furosemide (LASIX) 20 MG tablet Take 20 mg by mouth 3 times daily. therapeutic multivitamin-minerals (THERAGRAN-M) tablet Take 1 tablet by mouth daily. No facility-administered medications prior to visit. REVIEW OF SYSTEMS: .   Respiratory: Negative for shortness of breath. Cardiovascular: Negative for chest pain, palpitations  Gastrointestinal: Negative for blood in stool, abdominal distention, nausea, vomiting, abdominal pain, diarrhea,constipation. Neurological: Negative for speech difficulty, weakness and light-headedness, dizziness, tremors, sleepiness  Psychiatric/Behavioral: Negative for suicidal ideas, hallucinations, behavioral problems, self-injury, decreased concentration/cognition, agitation, confusion. PHYSICAL EXAM:   Nursing note and vitals reviewed. /74   Pulse 70   Wt 222 lb (100.7 kg)   SpO2 98%   BMI 40.60 kg/m²   General Appearance: Patient is well nourished, well developed, well groomed and in no acute distress. Skin: Skin is warm and dry, good turgor . No rash or lesions noted. She is not diaphoretic. Pulmonary/Chest: Effort normal. No respiratory distress or use of accessory muscles. Auscultation revealing normal air entry. She does not have wheezes in the lung fields. She has no rales. Cardiovascular: Normal rate, regular rhythm, normal heart sounds, and does not have murmur. Exam reveals no gallop and no friction rub. Musculoskeletal / Extremities: Range of motion is normal. Gait is normal, assistive devices use: none.     She exhibits edema: none, and no tenderness. Neurological/Psychiatric:She is alert and oriented to person, place, and time. Coordination is  normal.   Judgement and Insight is normal  Her mood is Appropriate and affect is Neutral/Euthymic(normal) . Her behavior is normal.   thought content normal.        IMPRESSION:     1. Chronic pain syndrome - STABLE: Continue current treatment plan    2. Degeneration of lumbar or lumbosacral intervertebral disc - STABLE: Continue current treatment plan    3. Arthritis - STABLE: Continue current treatment plan    4. Fibromyalgia - STABLE: Continue current treatment plan    5. Primary osteoarthritis of both knees - STABLE: Continue current treatment plan    6. Chronic migraine without aura without status migrainosus, not intractable - STABLE: Continue current treatment plan    7. Mood disorder ([facility] - STABLE: Continue current treatment plan    8. Primary insomnia - STABLE: Continue current treatment plan        PLAN:  Informed verbal consent was obtained. -ROM/Stretching exercises as advised   -She was advised to increase fluids ( 5-7  glasses of fluid daily), limit caffeine, avoid cheese products, increase dietary fiber, increase activity and exercise as tolerated and relax regularly and enjoy meals   -Labs ordered CBC, CMP, and TSH.   -she was advised  to avoid using too many OTC analgesics to control the headaches, avoid chocolates, increased caffeine, cheeses and MSG nitrite containing foods, cigarette smoking.  To avoid bright lights, strong smells and skipping meals.   -Continue with Topamax  -She was advised weight reduction, diet changes- 800-1200 idalia diet, diet diary, exercising, nutritional  consult increased physical activity as tolerated   -CBT techniques- relaxation therapies such as biofeedback, mindfulness based stress reduction, imagery, cognitive restructuring, problem solving discussed with patient   -she was advised proper sleep hygiene-told to avoid:use of caffeine or other stimulants after noon, alcohol use near bedtime, long or frequent naps during the day, erratic sleep schedule, heavy meals near bedtime, vigorous exercise near bedtime and use of electronic devices near bedtime   -Continue with Elavil  -Continue with Ms Contin along with Oxycodone 3 per day  -Walking as tolerated    -Continue with Neurontin  Ms. Laura Espino will be prescribed  the medications  listed below which are for treatment of her presenting  medical problems which for this visit include:   Diagnoses of Chronic pain syndrome, Degeneration of lumbar or lumbosacral intervertebral disc, Arthritis, Fibromyalgia, Primary osteoarthritis of both knees, Chronic migraine without aura without status migrainosus, not intractable, Mood disorder ([address] Primary insomnia, and Encounter for long-term (current) use of high-risk medication were pertinent to this visit. Medications/orders associated with this visit:    Current Outpatient Medications   Medication Sig Dispense Refill    topiramate (TOPAMAX) 25 MG tablet Take 1 tablet by mouth in the morning and 1 tablet before bedtime. 60 tablet 1    morphine (MS CONTIN) 30 MG extended release tablet Take 1 tablet by mouth in the morning for 28 days. 28 tablet 0    oxyCODONE (ROXICODONE) 5 MG immediate release tablet Take 1 tablet by mouth every 6 hours as needed for Pain (max 3 per day) for up to 28 days.  84 tablet 0    amitriptyline (ELAVIL) 25 MG tablet Take 1-2 tablets by mouth nightly 180 tablet 0    gabapentin (NEURONTIN) 600 MG tablet Take 1 tablet po in Am, take 1 tablet po in afternoon, take 2 tablets po in Pm 360 tablet 0    diclofenac (VOLTAREN) 75 MG EC tablet Take 1 tablet by mouth daily 90 tablet 0    ketoconazole (NIZORAL) 2 % shampoo WASH SCALP THREE TIMES PER WEEK 120 mL 3    FLUoxetine (PROZAC) 40 MG capsule       Prenatal Vit-Fe Fumarate-FA (PNV PRENATAL PLUS MULTIVITAMIN) 27-1 MG TABS       Prenatal Vit-DSS-Fe Cbn-FA (VIRT-CHAN GT) 90-1 MG TABS       omeprazole (PRILOSEC) 40 MG capsule Take 40 mg by mouth daily. KLOR-CON M20 20 MEQ tablet TAKE TWO TABLETS BY MOUTH TWICE A  tablet 3    VIVELLE-DOT 0.05 MG/24HR APPLY 1 PATCH TO SKIN TWICE WEEKLY 8 patch 11    furosemide (LASIX) 20 MG tablet Take 20 mg by mouth 3 times daily. therapeutic multivitamin-minerals (THERAGRAN-M) tablet Take 1 tablet by mouth daily. No current facility-administered medications for this visit. Goals of current treatment regimen include improvement in pain, restoration of functioning- with focus on improvement in physical performance, general activity, work or disability,emotional distress, health care utilization and  decreased medication consumption. Will continue to monitor progress towards achieving/maintaining therapeutic goals with special emphasis on  1. Improvement in perceived interfernce  of pain with ADL's. Ability to do home exercises independently. Ability to do household chores indoor and/or outdoor work and social and leisure activities. To increase flexibility/ROM, strength and endurance. Improve psychosocial and physical functioning.- she is showing progression towards this treatment goal with the current regimen. 2. Improving sleep to 6-7 hours a night. Improve mood/ anxiety and depression symptoms such as crying spells, low energy, problems with concentration, motivation. - she is showing progression towards this treatment goal with the current regimen. 3. Reduction of reliance on opioid analgesia/more appropriate opioid use. - she is showing progression towards this treatment goal with the current regimen. Risks and benefits of the medications and other alternative treatments have been/were  discussed with the patient. Any questions on the  common side effects of these medications were also answered.   She was advised against drinking alcohol with the narcotic pain medicines, advised against driving or handling machinery when  starting or adjusting the dose of medicines, feeling groggy or drowsy, or if having any cognitive issues related to the current medications. Sheis fully aware of the risk of overdose and death, if medicines are misused and not taken as prescribed. If she develops new symptoms or if the symptoms worsen, she was told to call the office. .  Thank you for allowing me to participate in the care of this patient.     Bonnee Siemens, MD    Cc: Irene Stoner MD

## 2022-08-31 ENCOUNTER — HOSPITAL ENCOUNTER (OUTPATIENT)
Age: 67
Discharge: HOME | End: 2022-08-31
Payer: MEDICARE

## 2022-08-31 DIAGNOSIS — G89.4 CHRONIC PAIN SYNDROME: ICD-10-CM

## 2022-08-31 LAB
A/G RATIO: 1.2 (ref 1.1–2.2)
ALBUMIN SERPL-MCNC: 3.9 G/DL (ref 3.4–5)
ALP BLD-CCNC: 93 U/L (ref 40–129)
ALT SERPL-CCNC: 21 U/L (ref 10–40)
ANION GAP SERPL CALCULATED.3IONS-SCNC: 9 MMOL/L (ref 3–16)
AST SERPL-CCNC: 22 U/L (ref 15–37)
BILIRUB SERPL-MCNC: <0.2 MG/DL (ref 0–1)
BUN BLDV-MCNC: 14 MG/DL (ref 7–20)
CALCIUM SERPL-MCNC: 8.8 MG/DL (ref 8.3–10.6)
CHLORIDE BLD-SCNC: 106 MMOL/L (ref 99–110)
CO2: 28 MMOL/L (ref 21–32)
CREAT SERPL-MCNC: 0.8 MG/DL (ref 0.6–1.2)
GFR AFRICAN AMERICAN: >60
GFR NON-AFRICAN AMERICAN: >60
GLUCOSE BLD-MCNC: 112 MG/DL (ref 70–99)
HCT VFR BLD CALC: 36.9 % (ref 36–48)
HEMOGLOBIN: 12.3 G/DL (ref 12–16)
MCH RBC QN AUTO: 30.8 PG (ref 26–34)
MCHC RBC AUTO-ENTMCNC: 33.4 G/DL (ref 31–36)
MCV RBC AUTO: 92.3 FL (ref 80–100)
PDW BLD-RTO: 13.2 % (ref 12.4–15.4)
PLATELET # BLD: 228 K/UL (ref 135–450)
PMV BLD AUTO: 6.8 FL (ref 5–10.5)
POTASSIUM SERPL-SCNC: 4 MMOL/L (ref 3.5–5.1)
RBC # BLD: 4 M/UL (ref 4–5.2)
SODIUM BLD-SCNC: 143 MMOL/L (ref 136–145)
TOTAL PROTEIN: 7.2 G/DL (ref 6.4–8.2)
TSH SERPL DL<=0.05 MIU/L-ACNC: 2.21 UIU/ML (ref 0.27–4.2)
WBC # BLD: 5 K/UL (ref 4–11)

## 2022-08-31 PROCEDURE — 36415 COLL VENOUS BLD VENIPUNCTURE: CPT

## 2022-08-31 PROCEDURE — 80053 COMPREHEN METABOLIC PANEL: CPT

## 2022-08-31 PROCEDURE — 85027 COMPLETE CBC AUTOMATED: CPT

## 2022-08-31 PROCEDURE — 84443 ASSAY THYROID STIM HORMONE: CPT

## 2022-09-08 ENCOUNTER — OFFICE VISIT (OUTPATIENT)
Dept: PAIN MANAGEMENT | Age: 67
End: 2022-09-08
Payer: MEDICARE

## 2022-09-08 VITALS
WEIGHT: 220 LBS | HEART RATE: 79 BPM | SYSTOLIC BLOOD PRESSURE: 118 MMHG | DIASTOLIC BLOOD PRESSURE: 69 MMHG | BODY MASS INDEX: 40.24 KG/M2

## 2022-09-08 DIAGNOSIS — M79.7 FIBROMYALGIA: ICD-10-CM

## 2022-09-08 DIAGNOSIS — G89.4 CHRONIC PAIN SYNDROME: ICD-10-CM

## 2022-09-08 DIAGNOSIS — M17.0 PRIMARY OSTEOARTHRITIS OF BOTH KNEES: ICD-10-CM

## 2022-09-08 DIAGNOSIS — M51.37 DEGENERATION OF LUMBAR OR LUMBOSACRAL INTERVERTEBRAL DISC: ICD-10-CM

## 2022-09-08 DIAGNOSIS — M19.90 ARTHRITIS: ICD-10-CM

## 2022-09-08 PROCEDURE — 1123F ACP DISCUSS/DSCN MKR DOCD: CPT | Performed by: INTERNAL MEDICINE

## 2022-09-08 PROCEDURE — 99213 OFFICE O/P EST LOW 20 MIN: CPT | Performed by: INTERNAL MEDICINE

## 2022-09-08 RX ORDER — MORPHINE SULFATE 30 MG/1
30 TABLET, FILM COATED, EXTENDED RELEASE ORAL DAILY
Qty: 28 TABLET | Refills: 0 | Status: SHIPPED | OUTPATIENT
Start: 2022-09-08 | End: 2022-10-06 | Stop reason: SDUPTHER

## 2022-09-08 RX ORDER — OXYCODONE HYDROCHLORIDE 5 MG/1
5 TABLET ORAL EVERY 6 HOURS PRN
Qty: 84 TABLET | Refills: 0 | Status: SHIPPED | OUTPATIENT
Start: 2022-09-08 | End: 2022-10-06 | Stop reason: SDUPTHER

## 2022-09-08 RX ORDER — TOPIRAMATE 25 MG/1
25 TABLET ORAL 2 TIMES DAILY
Qty: 60 TABLET | Refills: 1 | Status: SHIPPED | OUTPATIENT
Start: 2022-09-08 | End: 2022-11-03 | Stop reason: SDUPTHER

## 2022-09-09 NOTE — PROGRESS NOTES
Alex Ramirez  1955  [id number]      HISTORY OF PRESENT ILLNESS:  Ms. Manohar Jackson is a 69 y.o. female returns for a follow up visit for pain management  She has a diagnosis of   1. Chronic pain syndrome    2. Degeneration of lumbar or lumbosacral intervertebral disc    3. Arthritis    4. Fibromyalgia    5. Primary osteoarthritis of both knees    6. Chronic migraine without aura without status migrainosus, not intractable    . She complains of pain in the  neck, upper back, lower back, bilateral elbows, bilateral knees with radiation to the bilateral hips, bilateral ankles, bilateral feet  She rates the pain 6/10 and describes it as sharp, pins and needles. Current treatment regimen has helped relieve about 60% of the pain. She denies any side effects from the current pain regimen. Patient reports that since the last follow up visit the physical functioning is unchanged, family/social relationships are unchanged, mood is unchanged sleep patterns are unchanged, and that the overall functioning is unchanged. Patient denies misusing/abusing her narcotic pain medications or using any illegal drugs. There are No indicators for possible drug abuse, addiction or diversion problems. Patient states he has been doing fair, pain has been manageable with medications. He says using Ms-Contin with Oxycodone for btp. He reports he has been managing his ADLs and house chores. He states his weight has been stable. ALLERGIES: Patients list of allergies were reviewed     MEDICATIONS: Ms. Manohar Jackson list of medications were reviewed. Her current medications are   Outpatient Medications Prior to Visit   Medication Sig Dispense Refill    diclofenac (VOLTAREN) 75 MG EC tablet Take 1 tablet by mouth in the morning.  90 tablet 0    amitriptyline (ELAVIL) 25 MG tablet Take 1-2 tablets by mouth nightly 180 tablet 0    gabapentin (NEURONTIN) 600 MG tablet Take 1 tablet po in Am, take 1 tablet po in afternoon, take 2 tablets po in Pm 360 tablet 0    ketoconazole (NIZORAL) 2 % shampoo WASH SCALP THREE TIMES PER WEEK 120 mL 3    FLUoxetine (PROZAC) 40 MG capsule       Prenatal Vit-Fe Fumarate-FA (PNV PRENATAL PLUS MULTIVITAMIN) 27-1 MG TABS       Prenatal Vit-DSS-Fe Cbn-FA (VIRT-CHAN GT) 90-1 MG TABS       omeprazole (PRILOSEC) 40 MG capsule Take 40 mg by mouth daily. KLOR-CON M20 20 MEQ tablet TAKE TWO TABLETS BY MOUTH TWICE A  tablet 3    VIVELLE-DOT 0.05 MG/24HR APPLY 1 PATCH TO SKIN TWICE WEEKLY 8 patch 11    furosemide (LASIX) 20 MG tablet Take 20 mg by mouth 3 times daily. therapeutic multivitamin-minerals (THERAGRAN-M) tablet Take 1 tablet by mouth daily. topiramate (TOPAMAX) 25 MG tablet Take 1 tablet by mouth in the morning and 1 tablet before bedtime. 60 tablet 1    morphine (MS CONTIN) 30 MG extended release tablet Take 1 tablet by mouth in the morning for 28 days. 28 tablet 0    oxyCODONE (ROXICODONE) 5 MG immediate release tablet Take 1 tablet by mouth every 6 hours as needed for Pain (max 3 per day) for up to 28 days. 84 tablet 0     No facility-administered medications prior to visit. REVIEW OF SYSTEMS:    Respiratory: Negative for apnea, chest tightness and shortness of breath or change in baseline breathing. PHYSICAL EXAM:   Nursing note and vitals reviewed. /69   Pulse 79   Wt 220 lb (99.8 kg)   BMI 40.24 kg/m²   Constitutional: She appears well-developed and well-nourished. No acute distress. Cardiovascular: Normal rate, regular rhythm, normal heart sounds, and does not have murmur. Pulmonary/Chest: Effort normal. No respiratory distress. She does not have wheezes in the lung fields. She has no rales. Neurological/Psychiatric:She is alert and oriented to person, place, and time. Coordination is  normal.  Her mood isAppropriate and affect is Neutral/Euthymic(normal) . Her    IMPRESSION:   1. Chronic pain syndrome    2.  Degeneration of lumbar or lumbosacral intervertebral disc    3. Arthritis    4. Fibromyalgia    5. Primary osteoarthritis of both knees        PLAN:  Informed verbal consent was obtained  -Continue with current opioid regimen   -Continue with Ms Contin along with Oxycodone 3 per day for BTP  -She was advised weight reduction, diet changes- 800-1200 idalia diet, diet diary, exercising, nutritional  consult increased physical activity as tolerated   -Walking 20-30 minutes daily as tolerated   -Continue with all other adjuvant medications as before   -Most recent labs were reviewed and are baseline    Current Outpatient Medications   Medication Sig Dispense Refill    topiramate (TOPAMAX) 25 MG tablet Take 1 tablet by mouth 2 times daily 60 tablet 1    morphine (MS CONTIN) 30 MG extended release tablet Take 1 tablet by mouth daily for 28 days. 28 tablet 0    oxyCODONE (ROXICODONE) 5 MG immediate release tablet Take 1 tablet by mouth every 6 hours as needed for Pain (max 3 per day) for up to 28 days. 84 tablet 0    diclofenac (VOLTAREN) 75 MG EC tablet Take 1 tablet by mouth in the morning. 90 tablet 0    amitriptyline (ELAVIL) 25 MG tablet Take 1-2 tablets by mouth nightly 180 tablet 0    gabapentin (NEURONTIN) 600 MG tablet Take 1 tablet po in Am, take 1 tablet po in afternoon, take 2 tablets po in Pm 360 tablet 0    ketoconazole (NIZORAL) 2 % shampoo WASH SCALP THREE TIMES PER WEEK 120 mL 3    FLUoxetine (PROZAC) 40 MG capsule       Prenatal Vit-Fe Fumarate-FA (PNV PRENATAL PLUS MULTIVITAMIN) 27-1 MG TABS       Prenatal Vit-DSS-Fe Cbn-FA (VIRT-CHAN GT) 90-1 MG TABS       omeprazole (PRILOSEC) 40 MG capsule Take 40 mg by mouth daily. KLOR-CON M20 20 MEQ tablet TAKE TWO TABLETS BY MOUTH TWICE A  tablet 3    VIVELLE-DOT 0.05 MG/24HR APPLY 1 PATCH TO SKIN TWICE WEEKLY 8 patch 11    furosemide (LASIX) 20 MG tablet Take 20 mg by mouth 3 times daily. therapeutic multivitamin-minerals (THERAGRAN-M) tablet Take 1 tablet by mouth daily.        No current facility-administered medications for this visit. I will continue her current medication regimen  which is part of the above treatment schedule. It has been helping with Ms. Harris's chronic  medical problems which for this visit include:   Diagnoses of Chronic pain syndrome, Degeneration of lumbar or lumbosacral intervertebral disc, Arthritis, Fibromyalgia, Primary osteoarthritis of both knees, and Chronic migraine without aura without status migrainosus, not intractable were pertinent to this visit. Risks and benefits of the medications and other alternative treatments  including no treatment were discussed with the patient. The common side effects of these medications were also explained to the patient. Informed verbal consent was obtained. Goals of current treatment regimen include improvement in pain, restoration of functioning- with focus on improvement in physical performance, general activity, work or disability,emotional distress, health care utilization and  decreased medication consumption. Will continue to monitor progress towards achieving/maintaining therapeutic goals with special emphasis on  1. Improvement in perceived interfernce  of pain with ADL's. Ability to do home exercises independently. Ability to do household chores indoor and/or outdoor work and social and leisure activities. Improve psychosocial and physical functioning. - she is showing progression towards this treatment goal with the current regimen. She was advised against drinking alcohol with the narcotic pain medicines, advised against driving or handling machinery while adjusting the dose of medicines or if having cognitive  issues related to the current medications. Risk of overdose and death, if medicines not taken as prescribed, were also discussed. If the patient develops new symptoms or if the symptoms worsen, the patient should call the office.     While transcribing every attempt was made to maintain the accuracy of the note in terms of it's contents,there may have been some errors made inadvertently. Thank you for allowing me to participate in the care of this patient.     Elaina Wilson MD.    Cc: Leighton Almanza MD

## 2022-10-06 ENCOUNTER — OFFICE VISIT (OUTPATIENT)
Dept: PAIN MANAGEMENT | Age: 67
End: 2022-10-06
Payer: MEDICARE

## 2022-10-06 VITALS
DIASTOLIC BLOOD PRESSURE: 80 MMHG | OXYGEN SATURATION: 95 % | BODY MASS INDEX: 41.04 KG/M2 | WEIGHT: 224.4 LBS | SYSTOLIC BLOOD PRESSURE: 127 MMHG | HEART RATE: 73 BPM

## 2022-10-06 DIAGNOSIS — G89.4 CHRONIC PAIN SYNDROME: ICD-10-CM

## 2022-10-06 DIAGNOSIS — M51.37 DEGENERATION OF LUMBAR OR LUMBOSACRAL INTERVERTEBRAL DISC: ICD-10-CM

## 2022-10-06 DIAGNOSIS — M79.7 FIBROMYALGIA: ICD-10-CM

## 2022-10-06 DIAGNOSIS — M19.90 ARTHRITIS: ICD-10-CM

## 2022-10-06 DIAGNOSIS — M17.0 PRIMARY OSTEOARTHRITIS OF BOTH KNEES: ICD-10-CM

## 2022-10-06 PROCEDURE — 99213 OFFICE O/P EST LOW 20 MIN: CPT | Performed by: INTERNAL MEDICINE

## 2022-10-06 PROCEDURE — 1123F ACP DISCUSS/DSCN MKR DOCD: CPT | Performed by: INTERNAL MEDICINE

## 2022-10-06 RX ORDER — OXYCODONE HYDROCHLORIDE 5 MG/1
5 TABLET ORAL EVERY 6 HOURS PRN
Qty: 84 TABLET | Refills: 0 | Status: SHIPPED | OUTPATIENT
Start: 2022-10-06 | End: 2022-11-03 | Stop reason: SDUPTHER

## 2022-10-06 RX ORDER — MORPHINE SULFATE 30 MG/1
30 TABLET, FILM COATED, EXTENDED RELEASE ORAL DAILY
Qty: 28 TABLET | Refills: 0 | Status: SHIPPED | OUTPATIENT
Start: 2022-10-06 | End: 2022-11-03 | Stop reason: SDUPTHER

## 2022-10-06 RX ORDER — GABAPENTIN 600 MG/1
TABLET ORAL
Qty: 360 TABLET | Refills: 0 | Status: SHIPPED | OUTPATIENT
Start: 2022-10-06 | End: 2022-11-30

## 2022-10-06 RX ORDER — KETOCONAZOLE 20 MG/ML
SHAMPOO TOPICAL
Qty: 120 ML | Refills: 3 | OUTPATIENT
Start: 2022-10-06

## 2022-10-06 RX ORDER — DICLOFENAC SODIUM 75 MG/1
75 TABLET, DELAYED RELEASE ORAL DAILY
Qty: 90 TABLET | Refills: 0 | Status: SHIPPED | OUTPATIENT
Start: 2022-10-06

## 2022-10-06 RX ORDER — AMITRIPTYLINE HYDROCHLORIDE 25 MG/1
25-50 TABLET, FILM COATED ORAL NIGHTLY
Qty: 180 TABLET | Refills: 0 | Status: SHIPPED | OUTPATIENT
Start: 2022-10-06

## 2022-10-07 NOTE — PROGRESS NOTES
Ciarra Fat  1955  [id number]      HISTORY OF PRESENT ILLNESS:  Ms. Abdi Bell is a 69 y.o. female returns for a follow up visit for pain management  She has a diagnosis of   1. Chronic pain syndrome    2. Fibromyalgia    3. Mood disorder ([address]    4. Degeneration of lumbar or lumbosacral intervertebral disc    5. Primary osteoarthritis of both knees    6. Primary insomnia    7. Arthritis    8. Chronic migraine without aura without status migrainosus, not intractable    9. Encounter for therapeutic drug monitoring    . She complains of pain in the  bilateral elbows, bilateral knees, lower back with radiation to the bilateral shoulders, bilateral ankles, bilateral feet6  She rates the pain 5/10 and describes it as sharp, aching, burning. Current treatment regimen has helped relieve about 60% of the pain. She denies any side effects from the current pain regimen. Patient reports that since the last follow up visit the physical functioning is unchanged, family/social relationships are unchanged, mood is unchanged sleep patterns are unchanged, and that the overall functioning is unchanged. Patient denies misusing/abusing her narcotic pain medications or using any illegal drugs. There are No indicators for possible drug abuse, addiction or diversion problems. Patient states she has been oing fair, pain has been manageable with the medications. She says she is using Ms. Contin with Oxycodone for btp. She denies any constipation symptoms. She reports her weight has been stable. ALLERGIES: Patients list of allergies were reviewed     MEDICATIONS: Ms. Abdi Bell list of medications were reviewed. Her current medications are   Outpatient Medications Prior to Visit   Medication Sig Dispense Refill    topiramate (TOPAMAX) 25 MG tablet Take 1 tablet by mouth 2 times daily 60 tablet 1    ketoconazole (NIZORAL) 2 % shampoo WASH SCALP THREE TIMES PER WEEK 120 mL 3    FLUoxetine (PROZAC) 40 MG capsule Prenatal Vit-Fe Fumarate-FA (PNV PRENATAL PLUS MULTIVITAMIN) 27-1 MG TABS       Prenatal Vit-DSS-Fe Cbn-FA (VIRT-CHAN GT) 90-1 MG TABS       omeprazole (PRILOSEC) 40 MG capsule Take 40 mg by mouth daily. KLOR-CON M20 20 MEQ tablet TAKE TWO TABLETS BY MOUTH TWICE A  tablet 3    VIVELLE-DOT 0.05 MG/24HR APPLY 1 PATCH TO SKIN TWICE WEEKLY 8 patch 11    furosemide (LASIX) 20 MG tablet Take 20 mg by mouth 3 times daily. therapeutic multivitamin-minerals (THERAGRAN-M) tablet Take 1 tablet by mouth daily. morphine (MS CONTIN) 30 MG extended release tablet Take 1 tablet by mouth daily for 28 days. 28 tablet 0    oxyCODONE (ROXICODONE) 5 MG immediate release tablet Take 1 tablet by mouth every 6 hours as needed for Pain (max 3 per day) for up to 28 days. 84 tablet 0    diclofenac (VOLTAREN) 75 MG EC tablet Take 1 tablet by mouth in the morning. 90 tablet 0    amitriptyline (ELAVIL) 25 MG tablet Take 1-2 tablets by mouth nightly 180 tablet 0    gabapentin (NEURONTIN) 600 MG tablet Take 1 tablet po in Am, take 1 tablet po in afternoon, take 2 tablets po in Pm 360 tablet 0     No facility-administered medications prior to visit. REVIEW OF SYSTEMS:    Respiratory: Negative for apnea, chest tightness and shortness of breath or change in baseline breathing. PHYSICAL EXAM:   Nursing note and vitals reviewed. /80   Pulse 73   Wt 224 lb 6.4 oz (101.8 kg)   SpO2 95%   BMI 41.04 kg/m²   Constitutional: She appears well-developed and well-nourished. No acute distress. Cardiovascular: Normal rate, regular rhythm, normal heart sounds, and does not have murmur. Pulmonary/Chest: Effort normal. No respiratory distress. She does not have wheezes in the lung fields. She has no rales. Neurological/Psychiatric:She is alert and oriented to person, place, and time. Coordination is  normal.  Her mood isAppropriate and affect is Neutral/Euthymic(normal) .  Her  Other:trace edema LE  IMPRESSION: 1. Chronic pain syndrome    2. Fibromyalgia    3. Degeneration of lumbar or lumbosacral intervertebral disc    4. Primary osteoarthritis of both knees    5. Arthritis        PLAN:  Informed verbal consent was obtained  -ROM/Stretching exercises as advised   -She was advised weight reduction, diet changes- 800-1200 idalia diet, diet diary, exercising, nutritional  consult increased physical activity as tolerated   -She was advised to increase fluids ( 5-7  glasses of fluid daily), limit caffeine, avoid cheese products, increase dietary fiber, increase activity and exercise as tolerated and relax regularly and enjoy meals   -Walking as tolerated    -Continue with Ms Contin along with Oxycodone 3 per day for btp  Current Outpatient Medications   Medication Sig Dispense Refill    morphine (MS CONTIN) 30 MG extended release tablet Take 1 tablet by mouth daily for 28 days. 28 tablet 0    oxyCODONE (ROXICODONE) 5 MG immediate release tablet Take 1 tablet by mouth every 6 hours as needed for Pain (max 3 per day) for up to 28 days. 84 tablet 0    diclofenac (VOLTAREN) 75 MG EC tablet Take 1 tablet by mouth daily 90 tablet 0    amitriptyline (ELAVIL) 25 MG tablet Take 1-2 tablets by mouth nightly 180 tablet 0    gabapentin (NEURONTIN) 600 MG tablet Take 1 tablet po in Am, take 1 tablet po in afternoon, take 2 tablets po in Pm 360 tablet 0    topiramate (TOPAMAX) 25 MG tablet Take 1 tablet by mouth 2 times daily 60 tablet 1    ketoconazole (NIZORAL) 2 % shampoo WASH SCALP THREE TIMES PER WEEK 120 mL 3    FLUoxetine (PROZAC) 40 MG capsule       Prenatal Vit-Fe Fumarate-FA (PNV PRENATAL PLUS MULTIVITAMIN) 27-1 MG TABS       Prenatal Vit-DSS-Fe Cbn-FA (VIRT-CHAN GT) 90-1 MG TABS       omeprazole (PRILOSEC) 40 MG capsule Take 40 mg by mouth daily.       KLOR-CON M20 20 MEQ tablet TAKE TWO TABLETS BY MOUTH TWICE A  tablet 3    VIVELLE-DOT 0.05 MG/24HR APPLY 1 PATCH TO SKIN TWICE WEEKLY 8 patch 11    furosemide (LASIX) 20 MG tablet Take 20 mg by mouth 3 times daily. therapeutic multivitamin-minerals (THERAGRAN-M) tablet Take 1 tablet by mouth daily. No current facility-administered medications for this visit. I will continue her current medication regimen  which is part of the above treatment schedule. It has been helping with Ms. Harris's chronic  medical problems which for this visit include:   Diagnoses of Chronic pain syndrome, Fibromyalgia, Mood disorder ([address] Degeneration of lumbar or lumbosacral intervertebral disc, Primary osteoarthritis of both knees, Primary insomnia, Arthritis, Chronic migraine without aura without status migrainosus, not intractable, and Encounter for therapeutic drug monitoring were pertinent to this visit. Risks and benefits of the medications and other alternative treatments  including no treatment were discussed with the patient. The common side effects of these medications were also explained to the patient. Informed verbal consent was obtained. Goals of current treatment regimen include improvement in pain, restoration of functioning- with focus on improvement in physical performance, general activity, work or disability,emotional distress, health care utilization and  decreased medication consumption. Will continue to monitor progress towards achieving/maintaining therapeutic goals with special emphasis on  1. Improvement in perceived interfernce  of pain with ADL's. Ability to do home exercises independently. Ability to do household chores indoor and/or outdoor work and social and leisure activities. Improve psychosocial and physical functioning. - she is showing progression towards this treatment goal with the current regimen. She was advised against drinking alcohol with the narcotic pain medicines, advised against driving or handling machinery while adjusting the dose of medicines or if having cognitive  issues related to the current medications. Risk of overdose and death, if medicines not taken as prescribed, were also discussed. If the patient develops new symptoms or if the symptoms worsen, the patient should call the office. While transcribing every attempt was made to maintain the accuracy of the note in terms of it's contents,there may have been some errors made inadvertently. Thank you for allowing me to participate in the care of this patient.     Baldev Gaelano MD.    Cc: João Love MD

## 2022-11-03 ENCOUNTER — OFFICE VISIT (OUTPATIENT)
Dept: PAIN MANAGEMENT | Age: 67
End: 2022-11-03
Payer: MEDICARE

## 2022-11-03 VITALS
HEART RATE: 75 BPM | DIASTOLIC BLOOD PRESSURE: 75 MMHG | WEIGHT: 221 LBS | SYSTOLIC BLOOD PRESSURE: 120 MMHG | BODY MASS INDEX: 40.42 KG/M2

## 2022-11-03 DIAGNOSIS — M19.90 ARTHRITIS: ICD-10-CM

## 2022-11-03 DIAGNOSIS — G89.4 CHRONIC PAIN SYNDROME: ICD-10-CM

## 2022-11-03 DIAGNOSIS — M79.7 FIBROMYALGIA: ICD-10-CM

## 2022-11-03 DIAGNOSIS — M17.0 PRIMARY OSTEOARTHRITIS OF BOTH KNEES: ICD-10-CM

## 2022-11-03 DIAGNOSIS — M51.37 DEGENERATION OF LUMBAR OR LUMBOSACRAL INTERVERTEBRAL DISC: ICD-10-CM

## 2022-11-03 PROCEDURE — 99213 OFFICE O/P EST LOW 20 MIN: CPT | Performed by: INTERNAL MEDICINE

## 2022-11-03 PROCEDURE — 1123F ACP DISCUSS/DSCN MKR DOCD: CPT | Performed by: INTERNAL MEDICINE

## 2022-11-03 RX ORDER — MORPHINE SULFATE 30 MG/1
30 TABLET, FILM COATED, EXTENDED RELEASE ORAL DAILY
Qty: 30 TABLET | Refills: 0 | Status: SHIPPED | OUTPATIENT
Start: 2022-11-03 | End: 2022-12-03

## 2022-11-03 RX ORDER — TOPIRAMATE 25 MG/1
25 TABLET ORAL 2 TIMES DAILY
Qty: 60 TABLET | Refills: 1 | Status: SHIPPED | OUTPATIENT
Start: 2022-11-03

## 2022-11-03 RX ORDER — OXYCODONE HYDROCHLORIDE 5 MG/1
5 TABLET ORAL EVERY 6 HOURS PRN
Qty: 90 TABLET | Refills: 0 | Status: SHIPPED | OUTPATIENT
Start: 2022-11-03 | End: 2022-12-03

## 2022-11-03 NOTE — PROGRESS NOTES
Taniya Gilliam  1955  [id number]      HISTORY OF PRESENT ILLNESS:  Ms. Eligio Kirkpatrick is a 69 y.o. female returns for a follow up visit for pain management  She has a diagnosis of   1. Chronic pain syndrome    2. Fibromyalgia    3. Degeneration of lumbar or lumbosacral intervertebral disc    4. Primary osteoarthritis of both knees    5. Arthritis    . She complains of pain in the  bilateral shoulders, bilateral elbows, lower back, bilateral knees with radiation to the bilateral hands, bilateral feet  She rates the pain 6/10 and describes it as sharp, aching, burning. Current treatment regimen has helped relieve about 60% of the pain. She denies any side effects from the current pain regimen. Patient reports that since the last follow up visit the physical functioning is unchanged, family/social relationships are unchanged, mood is unchanged sleep patterns are unchanged, and that the overall functioning is unchanged. Patient denies misusing/abusing her narcotic pain medications or using any illegal drugs. There are No indicators for possible drug abuse, addiction or diversion problems, patient states she has been doing fair, she is managing with the medications. She mentions she is using Ms Contin along with Oxycodone 3 per day for btp. Patient reports she has been managing her ADL's and house chores. Patient reports her weight has been stable. ALLERGIES: Patients list of allergies were reviewed     MEDICATIONS: Ms. Eligio Kirkpatrick list of medications were reviewed. Her current medications are   Outpatient Medications Prior to Visit   Medication Sig Dispense Refill    morphine (MS CONTIN) 30 MG extended release tablet Take 1 tablet by mouth daily for 28 days. 28 tablet 0    oxyCODONE (ROXICODONE) 5 MG immediate release tablet Take 1 tablet by mouth every 6 hours as needed for Pain (max 3 per day) for up to 28 days.  84 tablet 0    diclofenac (VOLTAREN) 75 MG EC tablet Take 1 tablet by mouth daily 90 tablet 0    amitriptyline (ELAVIL) 25 MG tablet Take 1-2 tablets by mouth nightly 180 tablet 0    gabapentin (NEURONTIN) 600 MG tablet Take 1 tablet po in Am, take 1 tablet po in afternoon, take 2 tablets po in Pm 360 tablet 0    topiramate (TOPAMAX) 25 MG tablet Take 1 tablet by mouth 2 times daily 60 tablet 1    ketoconazole (NIZORAL) 2 % shampoo WASH SCALP THREE TIMES PER WEEK 120 mL 3    FLUoxetine (PROZAC) 40 MG capsule       Prenatal Vit-Fe Fumarate-FA (PNV PRENATAL PLUS MULTIVITAMIN) 27-1 MG TABS       Prenatal Vit-DSS-Fe Cbn-FA (VIRT-CHAN GT) 90-1 MG TABS       omeprazole (PRILOSEC) 40 MG capsule Take 40 mg by mouth daily. KLOR-CON M20 20 MEQ tablet TAKE TWO TABLETS BY MOUTH TWICE A  tablet 3    VIVELLE-DOT 0.05 MG/24HR APPLY 1 PATCH TO SKIN TWICE WEEKLY 8 patch 11    furosemide (LASIX) 20 MG tablet Take 20 mg by mouth 3 times daily. therapeutic multivitamin-minerals (THERAGRAN-M) tablet Take 1 tablet by mouth daily. No facility-administered medications prior to visit. REVIEW OF SYSTEMS:    Respiratory: Negative for apnea, chest tightness and shortness of breath or change in baseline breathing. PHYSICAL EXAM:   Nursing note and vitals reviewed. /75   Pulse 75   Wt 221 lb (100.2 kg)   BMI 40.42 kg/m²   Constitutional: She appears well-developed and well-nourished. No acute distress. Cardiovascular: Normal rate, regular rhythm, normal heart sounds, and does not have murmur. Pulmonary/Chest: Effort normal. No respiratory distress. She does not have wheezes in the lung fields. She has no rales. Neurological/Psychiatric:She is alert and oriented to person, place, and time. Coordination is  normal.  Her mood isAppropriate and affect is Neutral/Euthymic(normal) . Her  Other: trace edema  IMPRESSION:   1. Chronic pain syndrome    2. Fibromyalgia    3. Degeneration of lumbar or lumbosacral intervertebral disc    4. Primary osteoarthritis of both knees    5.  Arthritis PLAN:  Informed verbal consent was obtained  -[place] was obtained and reviewed  for the last one year and no indicators of drug misuse  were found. Any other controlled substance prescriptions  seen on the record have been accounted for, I am aware of the patient receiving these medications. Jerald Salazar [place] record will be rechecked as part of office protocol.    -Continue with Ms Contin along with Oxycodone 3 per day for btp  -She was advised to increase fluids ( 5-7  glasses of fluid daily), limit caffeine, avoid cheese products, increase dietary fiber, increase activity and exercise as tolerated and relax regularly and enjoy meals   -Walking as tolerated    -She was advised weight reduction, diet changes- 800-1200 idalia diet, diet diary, exercising, nutritional  consult increased physical activity as tolerated    Current Outpatient Medications   Medication Sig Dispense Refill    morphine (MS CONTIN) 30 MG extended release tablet Take 1 tablet by mouth daily for 28 days. 28 tablet 0    oxyCODONE (ROXICODONE) 5 MG immediate release tablet Take 1 tablet by mouth every 6 hours as needed for Pain (max 3 per day) for up to 28 days. 84 tablet 0    diclofenac (VOLTAREN) 75 MG EC tablet Take 1 tablet by mouth daily 90 tablet 0    amitriptyline (ELAVIL) 25 MG tablet Take 1-2 tablets by mouth nightly 180 tablet 0    gabapentin (NEURONTIN) 600 MG tablet Take 1 tablet po in Am, take 1 tablet po in afternoon, take 2 tablets po in Pm 360 tablet 0    topiramate (TOPAMAX) 25 MG tablet Take 1 tablet by mouth 2 times daily 60 tablet 1    ketoconazole (NIZORAL) 2 % shampoo WASH SCALP THREE TIMES PER WEEK 120 mL 3    FLUoxetine (PROZAC) 40 MG capsule       Prenatal Vit-Fe Fumarate-FA (PNV PRENATAL PLUS MULTIVITAMIN) 27-1 MG TABS       Prenatal Vit-DSS-Fe Cbn-FA (VIRT-CHAN GT) 90-1 MG TABS       omeprazole (PRILOSEC) 40 MG capsule Take 40 mg by mouth daily.       KLOR-CON M20 20 MEQ tablet TAKE TWO TABLETS BY MOUTH TWICE A  tablet 3    VIVELLE-DOT 0.05 MG/24HR APPLY 1 PATCH TO SKIN TWICE WEEKLY 8 patch 11    furosemide (LASIX) 20 MG tablet Take 20 mg by mouth 3 times daily. therapeutic multivitamin-minerals (THERAGRAN-M) tablet Take 1 tablet by mouth daily. No current facility-administered medications for this visit. I will continue her current medication regimen  which is part of the above treatment schedule. It has been helping with Ms. Harris's chronic  medical problems which for this visit include:   Diagnoses of Chronic pain syndrome, Fibromyalgia, Degeneration of lumbar or lumbosacral intervertebral disc, Primary osteoarthritis of both knees, and Arthritis were pertinent to this visit. Risks and benefits of the medications and other alternative treatments  including no treatment were discussed with the patient. The common side effects of these medications were also explained to the patient. Informed verbal consent was obtained. Goals of current treatment regimen include improvement in pain, restoration of functioning- with focus on improvement in physical performance, general activity, work or disability,emotional distress, health care utilization and  decreased medication consumption. Will continue to monitor progress towards achieving/maintaining therapeutic goals with special emphasis on  1. Improvement in perceived interfernce  of pain with ADL's. Ability to do home exercises independently. Ability to do household chores indoor and/or outdoor work and social and leisure activities. Improve psychosocial and physical functioning. - she is showing progression towards this treatment goal with the current regimen. She was advised against drinking alcohol with the narcotic pain medicines, advised against driving or handling machinery while adjusting the dose of medicines or if having cognitive  issues related to the current medications. Risk of overdose and death, if medicines not taken as prescribed, were

## 2022-12-05 ENCOUNTER — OFFICE VISIT (OUTPATIENT)
Dept: PAIN MANAGEMENT | Age: 67
End: 2022-12-05
Payer: MEDICARE

## 2022-12-05 VITALS
WEIGHT: 217 LBS | DIASTOLIC BLOOD PRESSURE: 76 MMHG | BODY MASS INDEX: 39.69 KG/M2 | SYSTOLIC BLOOD PRESSURE: 118 MMHG | HEART RATE: 70 BPM

## 2022-12-05 DIAGNOSIS — M19.90 ARTHRITIS: ICD-10-CM

## 2022-12-05 DIAGNOSIS — M79.7 FIBROMYALGIA: ICD-10-CM

## 2022-12-05 DIAGNOSIS — G89.4 CHRONIC PAIN SYNDROME: ICD-10-CM

## 2022-12-05 DIAGNOSIS — M51.37 DEGENERATION OF LUMBAR OR LUMBOSACRAL INTERVERTEBRAL DISC: ICD-10-CM

## 2022-12-05 DIAGNOSIS — M17.0 PRIMARY OSTEOARTHRITIS OF BOTH KNEES: ICD-10-CM

## 2022-12-05 PROCEDURE — 99213 OFFICE O/P EST LOW 20 MIN: CPT | Performed by: INTERNAL MEDICINE

## 2022-12-05 PROCEDURE — 1123F ACP DISCUSS/DSCN MKR DOCD: CPT | Performed by: INTERNAL MEDICINE

## 2022-12-05 RX ORDER — TOPIRAMATE 25 MG/1
25 TABLET ORAL 2 TIMES DAILY
Qty: 60 TABLET | Refills: 1 | Status: SHIPPED | OUTPATIENT
Start: 2022-12-05

## 2022-12-05 RX ORDER — MORPHINE SULFATE 30 MG/1
30 TABLET, FILM COATED, EXTENDED RELEASE ORAL DAILY
Qty: 30 TABLET | Refills: 0 | Status: SHIPPED | OUTPATIENT
Start: 2022-12-05 | End: 2023-01-04

## 2022-12-05 RX ORDER — OXYCODONE HYDROCHLORIDE 5 MG/1
5 TABLET ORAL EVERY 6 HOURS PRN
Qty: 90 TABLET | Refills: 0 | Status: SHIPPED | OUTPATIENT
Start: 2022-12-05 | End: 2023-01-04

## 2022-12-05 RX ORDER — AMITRIPTYLINE HYDROCHLORIDE 25 MG/1
25-50 TABLET, FILM COATED ORAL NIGHTLY
Qty: 180 TABLET | Refills: 0 | Status: SHIPPED | OUTPATIENT
Start: 2022-12-05

## 2022-12-05 RX ORDER — DICLOFENAC SODIUM 75 MG/1
75 TABLET, DELAYED RELEASE ORAL DAILY
Qty: 90 TABLET | Refills: 0 | Status: SHIPPED | OUTPATIENT
Start: 2022-12-05

## 2022-12-05 RX ORDER — GABAPENTIN 600 MG/1
TABLET ORAL
Qty: 360 TABLET | Refills: 0 | Status: SHIPPED | OUTPATIENT
Start: 2022-12-05 | End: 2023-01-29

## 2022-12-05 NOTE — PROGRESS NOTES
Areli Alfaro  1955  [id number]      HISTORY OF PRESENT ILLNESS:  Ms. Nubia Singletary is a 69 y.o. female returns for a follow up visit for pain management  She has a diagnosis of   1. Chronic pain syndrome    2. Fibromyalgia    3. Degeneration of lumbar or lumbosacral intervertebral disc    4. Primary osteoarthritis of both knees    5. Arthritis    . She complains of pain in the  bilateral shoulders, bilateral elbows, lower back with radiation to the bilateral hands, bilateral ankles, bilateral feet  She rates the pain 6/10 and describes it as sharp. Current treatment regimen has helped relieve about 60% of the pain. She denies any side effects from the current pain regimen. Patient reports that since the last follow up visit the physical functioning is unchanged, family/social relationships are unchanged, mood is unchanged sleep patterns are unchanged, and that the overall functioning is unchanged. Patient denies misusing/abusing her narcotic pain medications or using any illegal drugs. There are No indicators for possible drug abuse, addiction or diversion problems, patient states she has been doing fair, she is managing with the medications. Ms. Nubia Singletary states her headache symptoms are manageable. She mentions she is using all the adjuvants. Patient says the pain is greater in the back than the legs. She states she is using Ms Contin along with Oxycodone for btp. ALLERGIES: Patients list of allergies were reviewed     MEDICATIONS: Ms. Nubia Singletary list of medications were reviewed. Her current medications are   Outpatient Medications Prior to Visit   Medication Sig Dispense Refill    morphine (MS CONTIN) 30 MG extended release tablet Take 1 tablet by mouth daily for 30 days. 30 tablet 0    oxyCODONE (ROXICODONE) 5 MG immediate release tablet Take 1 tablet by mouth every 6 hours as needed for Pain (max 3 per day) for up to 30 days.  90 tablet 0    topiramate (TOPAMAX) 25 MG tablet Take 1 tablet by mouth 2 times daily 60 tablet 1    diclofenac (VOLTAREN) 75 MG EC tablet Take 1 tablet by mouth daily 90 tablet 0    amitriptyline (ELAVIL) 25 MG tablet Take 1-2 tablets by mouth nightly 180 tablet 0    ketoconazole (NIZORAL) 2 % shampoo WASH SCALP THREE TIMES PER WEEK 120 mL 3    FLUoxetine (PROZAC) 40 MG capsule       Prenatal Vit-Fe Fumarate-FA (PNV PRENATAL PLUS MULTIVITAMIN) 27-1 MG TABS       Prenatal Vit-DSS-Fe Cbn-FA (VIRT-CHAN GT) 90-1 MG TABS       omeprazole (PRILOSEC) 40 MG capsule Take 40 mg by mouth daily. KLOR-CON M20 20 MEQ tablet TAKE TWO TABLETS BY MOUTH TWICE A  tablet 3    VIVELLE-DOT 0.05 MG/24HR APPLY 1 PATCH TO SKIN TWICE WEEKLY 8 patch 11    furosemide (LASIX) 20 MG tablet Take 20 mg by mouth 3 times daily. therapeutic multivitamin-minerals (THERAGRAN-M) tablet Take 1 tablet by mouth daily. gabapentin (NEURONTIN) 600 MG tablet Take 1 tablet po in Am, take 1 tablet po in afternoon, take 2 tablets po in Pm 360 tablet 0     No facility-administered medications prior to visit. REVIEW OF SYSTEMS:    Respiratory: Negative for apnea, chest tightness and shortness of breath or change in baseline breathing. PHYSICAL EXAM:   Nursing note and vitals reviewed. /76   Pulse 70   Wt 217 lb (98.4 kg)   BMI 39.69 kg/m²   Constitutional: She appears well-developed and well-nourished. No acute distress. Cardiovascular: Normal rate, regular rhythm, normal heart sounds, and does not have murmur. Pulmonary/Chest: Effort normal. No respiratory distress. She does not have wheezes in the lung fields. She has no rales. Neurological/Psychiatric:She is alert and oriented to person, place, and time. Coordination is  normal.  Her mood isAppropriate and affect is Neutral/Euthymic(normal) . Her    IMPRESSION:   1. Chronic pain syndrome    2. Fibromyalgia    3. Degeneration of lumbar or lumbosacral intervertebral disc    4. Primary osteoarthritis of both knees    5. Arthritis        PLAN:  Informed verbal consent was obtained  -ROM/Stretching exercises as advised   -She was advised to increase fluids ( 5-7  glasses of fluid daily), limit caffeine, avoid cheese products, increase dietary fiber, increase activity and exercise as tolerated and relax regularly and enjoy meals   -Continue with Ms Contin along with Oxycodone 3 per day for btp  -She was advised weight reduction, diet changes- 800-1200 idalia diet, diet diary, exercising, nutritional  consult increased physical activity as tolerated    Current Outpatient Medications   Medication Sig Dispense Refill    morphine (MS CONTIN) 30 MG extended release tablet Take 1 tablet by mouth daily for 30 days. 30 tablet 0    oxyCODONE (ROXICODONE) 5 MG immediate release tablet Take 1 tablet by mouth every 6 hours as needed for Pain (max 3 per day) for up to 30 days. 90 tablet 0    topiramate (TOPAMAX) 25 MG tablet Take 1 tablet by mouth 2 times daily 60 tablet 1    diclofenac (VOLTAREN) 75 MG EC tablet Take 1 tablet by mouth daily 90 tablet 0    amitriptyline (ELAVIL) 25 MG tablet Take 1-2 tablets by mouth nightly 180 tablet 0    ketoconazole (NIZORAL) 2 % shampoo WASH SCALP THREE TIMES PER WEEK 120 mL 3    FLUoxetine (PROZAC) 40 MG capsule       Prenatal Vit-Fe Fumarate-FA (PNV PRENATAL PLUS MULTIVITAMIN) 27-1 MG TABS       Prenatal Vit-DSS-Fe Cbn-FA (VIRT-CHAN GT) 90-1 MG TABS       omeprazole (PRILOSEC) 40 MG capsule Take 40 mg by mouth daily. KLOR-CON M20 20 MEQ tablet TAKE TWO TABLETS BY MOUTH TWICE A  tablet 3    VIVELLE-DOT 0.05 MG/24HR APPLY 1 PATCH TO SKIN TWICE WEEKLY 8 patch 11    furosemide (LASIX) 20 MG tablet Take 20 mg by mouth 3 times daily. therapeutic multivitamin-minerals (THERAGRAN-M) tablet Take 1 tablet by mouth daily.       gabapentin (NEURONTIN) 600 MG tablet Take 1 tablet po in Am, take 1 tablet po in afternoon, take 2 tablets po in Pm 360 tablet 0     No current facility-administered medications for this visit. I will continue her current medication regimen  which is part of the above treatment schedule. It has been helping with Ms. Harris's chronic  medical problems which for this visit include:   Diagnoses of Chronic pain syndrome, Fibromyalgia, Degeneration of lumbar or lumbosacral intervertebral disc, Primary osteoarthritis of both knees, and Arthritis were pertinent to this visit. Risks and benefits of the medications and other alternative treatments  including no treatment were discussed with the patient. The common side effects of these medications were also explained to the patient. Informed verbal consent was obtained. Goals of current treatment regimen include improvement in pain, restoration of functioning- with focus on improvement in physical performance, general activity, work or disability,emotional distress, health care utilization and  decreased medication consumption. Will continue to monitor progress towards achieving/maintaining therapeutic goals with special emphasis on  1. Improvement in perceived interfernce  of pain with ADL's. Ability to do home exercises independently. Ability to do household chores indoor and/or outdoor work and social and leisure activities. Improve psychosocial and physical functioning. - she is showing progression towards this treatment goal with the current regimen. She was advised against drinking alcohol with the narcotic pain medicines, advised against driving or handling machinery while adjusting the dose of medicines or if having cognitive  issues related to the current medications. Risk of overdose and death, if medicines not taken as prescribed, were also discussed. If the patient develops new symptoms or if the symptoms worsen, the patient should call the office. While transcribing every attempt was made to maintain the accuracy of the note in terms of it's contents,there may have been some errors made inadvertently.   Thank you for allowing me to participate in the care of this patient.     Christian Leung MD.    Cc: Maricel Montero MD

## 2023-01-04 RX ORDER — KETOCONAZOLE 20 MG/ML
SHAMPOO TOPICAL
Qty: 120 ML | Refills: 3 | OUTPATIENT
Start: 2023-01-04

## 2023-01-05 ENCOUNTER — OFFICE VISIT (OUTPATIENT)
Dept: PAIN MANAGEMENT | Age: 68
End: 2023-01-05
Payer: MEDICARE

## 2023-01-05 VITALS
DIASTOLIC BLOOD PRESSURE: 75 MMHG | BODY MASS INDEX: 39.14 KG/M2 | HEART RATE: 71 BPM | WEIGHT: 214 LBS | SYSTOLIC BLOOD PRESSURE: 116 MMHG

## 2023-01-05 DIAGNOSIS — M51.37 DEGENERATION OF LUMBAR OR LUMBOSACRAL INTERVERTEBRAL DISC: ICD-10-CM

## 2023-01-05 DIAGNOSIS — G43.709 CHRONIC MIGRAINE WITHOUT AURA WITHOUT STATUS MIGRAINOSUS, NOT INTRACTABLE: ICD-10-CM

## 2023-01-05 DIAGNOSIS — G89.4 CHRONIC PAIN SYNDROME: ICD-10-CM

## 2023-01-05 DIAGNOSIS — M79.7 FIBROMYALGIA: ICD-10-CM

## 2023-01-05 DIAGNOSIS — F51.01 PRIMARY INSOMNIA: ICD-10-CM

## 2023-01-05 DIAGNOSIS — M17.0 PRIMARY OSTEOARTHRITIS OF BOTH KNEES: ICD-10-CM

## 2023-01-05 DIAGNOSIS — F39 MOOD DISORDER (HCC): ICD-10-CM

## 2023-01-05 DIAGNOSIS — M19.90 ARTHRITIS: ICD-10-CM

## 2023-01-05 PROCEDURE — 1123F ACP DISCUSS/DSCN MKR DOCD: CPT | Performed by: INTERNAL MEDICINE

## 2023-01-05 PROCEDURE — 99214 OFFICE O/P EST MOD 30 MIN: CPT | Performed by: INTERNAL MEDICINE

## 2023-01-05 RX ORDER — OXYCODONE HYDROCHLORIDE 5 MG/1
5 TABLET ORAL EVERY 6 HOURS PRN
Qty: 84 TABLET | Refills: 0 | Status: SHIPPED | OUTPATIENT
Start: 2023-01-05 | End: 2023-02-02

## 2023-01-05 RX ORDER — TOPIRAMATE 25 MG/1
25 TABLET ORAL 2 TIMES DAILY
Qty: 60 TABLET | Refills: 1 | Status: SHIPPED | OUTPATIENT
Start: 2023-01-05

## 2023-01-05 RX ORDER — MORPHINE SULFATE 30 MG/1
30 TABLET, FILM COATED, EXTENDED RELEASE ORAL DAILY
Qty: 28 TABLET | Refills: 0 | Status: SHIPPED | OUTPATIENT
Start: 2023-01-05 | End: 2023-02-02

## 2023-01-05 NOTE — PROGRESS NOTES
Ciarra Fat  1955  [id number]    HISTORY OF PRESENT ILLNESS:  Ms. Abdi Bell is a 69 y.o. female returns for a follow up visit for multiple medical problems. Her  presenting problems are   1. Chronic pain syndrome    2. Fibromyalgia    3. Degeneration of lumbar or lumbosacral intervertebral disc    4. Primary osteoarthritis of both knees    5. Arthritis    6. Chronic migraine without aura without status migrainosus, not intractable    7. Mood disorder ([facility] [address]    8. Primary insomnia    9. Encounter for long-term (current) use of high-risk medication    . As per information/history obtained from the PADT(patient assessment and documentation tool) -  She complains of pain in the shoulders Bilateral, elbows Bilateral, hands Bilateral, knees Bilateral, ankles Bilateral, and feet Bilateral She rates the pain 5/10 and describes it as sharp. Pain is made worse by: standing. Current treatment regimen has helped relieve about 50% of the pain. She denies side effects from the current pain regimen. Patient reports that since the last follow up visit the physical functioning is unchanged, family/social relationships are unchanged, mood is unchanged and sleep patterns are unchanged, and that the overall functioning is unchanged. Patient denies neurological bowel or bladder. Patient denies misusing/abusing her narcotic pain medications or using any illegal drugs. There are No indicators for possible drug abuse, addiction or diversion problems. Upon obtaining the medical history from Ms. Abdi Bell regarding today's office visit for her presenting problems, patient states he has been doing fair, pain has been manageable somewhat with the medications. He says the pain is greater in the back than the legs. She mentions she is using Ms. Contin with Oxycodone for btp. Patient states her sleep is fair. Has fairly normal sleep latency. Averages about 4-6 hours of sleep a night. Denies any signs of sleep apnea.  Feels somewhat rested in the morning.  She says she is using Elavil. she states that the medications are helping with the headaches  and they are tolerable.  Denies nausea, vomiting, sonophobia, photophobia, photopsia, diplopia, vertigo, neck stiffness. She mentions she is Topamax 25 mg BID. She reports her weight has been stable. She denies any constipation symptoms. She state she is managing her ADls. And house chores.       ALLERGIES/PAST MED/FAM/SOC HISTORY: Ms. Harris allergies, past medical, family and social history were reviewed in the chart.    Ms. Harris current medications are   Outpatient Medications Prior to Visit   Medication Sig Dispense Refill    morphine (MS CONTIN) 30 MG extended release tablet Take 1 tablet by mouth daily for 30 days. 30 tablet 0    oxyCODONE (ROXICODONE) 5 MG immediate release tablet Take 1 tablet by mouth every 6 hours as needed for Pain (max 3 per day) for up to 30 days. 90 tablet 0    topiramate (TOPAMAX) 25 MG tablet Take 1 tablet by mouth 2 times daily 60 tablet 1    diclofenac (VOLTAREN) 75 MG EC tablet Take 1 tablet by mouth daily 90 tablet 0    amitriptyline (ELAVIL) 25 MG tablet Take 1-2 tablets by mouth nightly 180 tablet 0    gabapentin (NEURONTIN) 600 MG tablet Take 1 tablet po in Am, take 1 tablet po in afternoon, take 2 tablets po in Pm 360 tablet 0    ketoconazole (NIZORAL) 2 % shampoo WASH SCALP THREE TIMES PER WEEK 120 mL 3    FLUoxetine (PROZAC) 40 MG capsule       Prenatal Vit-Fe Fumarate-FA (PNV PRENATAL PLUS MULTIVITAMIN) 27-1 MG TABS       Prenatal Vit-DSS-Fe Cbn-FA (VIRT-CHAN GT) 90-1 MG TABS       omeprazole (PRILOSEC) 40 MG capsule Take 40 mg by mouth daily.      KLOR-CON M20 20 MEQ tablet TAKE TWO TABLETS BY MOUTH TWICE A  tablet 3    VIVELLE-DOT 0.05 MG/24HR APPLY 1 PATCH TO SKIN TWICE WEEKLY 8 patch 11    furosemide (LASIX) 20 MG tablet Take 20 mg by mouth 3 times daily.      therapeutic multivitamin-minerals (THERAGRAN-M) tablet Take 1 tablet  by mouth daily. No facility-administered medications prior to visit. REVIEW OF SYSTEMS: .   Respiratory: Negative for shortness of breath. Cardiovascular: Negative for chest pain, palpitations  Gastrointestinal: Negative for blood in stool, abdominal distention, nausea, vomiting, abdominal pain, diarrhea,constipation. Neurological: Negative for speech difficulty, weakness and light-headedness, dizziness, tremors, sleepiness  Psychiatric/Behavioral: Negative for suicidal ideas, hallucinations, behavioral problems, self-injury, decreased concentration/cognition, agitation, confusion. PHYSICAL EXAM:   Nursing note and vitals reviewed. /75   Pulse 71   Wt 214 lb (97.1 kg)   BMI 39.14 kg/m²   General Appearance: Patient is well nourished, well developed, well groomed and in no acute distress. Skin: Skin is warm and dry, good turgor . No rash or lesions noted. She is not diaphoretic. Pulmonary/Chest: Effort normal. No respiratory distress or use of accessory muscles. Auscultation revealing normal air entry. She does not have wheezes in the lung fields. She has no rales. Cardiovascular: Normal rate, regular rhythm, normal heart sounds, and does not have murmur. Exam reveals no gallop and no friction rub. Musculoskeletal / Extremities: Range of motion is normal. Gait is normal, assistive devices use: none. She exhibits edema: none, and no tenderness. Neurological/Psychiatric:She is alert and oriented to person, place, and time. Coordination is  normal.   Judgement and Insight is normal  Her mood is Appropriate and affect is Neutral/Euthymic(normal) . Her behavior is normal.   thought content normal.        IMPRESSION:     1. Chronic pain syndrome - STABLE: Continue current treatment plan    2. Fibromyalgia  - STABLE: Continue current treatment plan   3. Degeneration of lumbar or lumbosacral intervertebral disc  - STABLE: Continue current treatment plan   4.  Primary osteoarthritis of both knees  - STABLE: Continue current treatment plan   5. Arthritis  - STABLE: Continue current treatment plan   6. Chronic migraine without aura without status migrainosus, not intractable  - STABLE: Continue current treatment plan   7. Mood disorder ([address]  - STABLE: Continue current treatment plan   8. Primary insomnia        PLAN:  Informed verbal consent was obtained. -ROM/Stretching exercises as advised   -She was advised weight reduction, diet changes- 800-1200 idalia diet, diet diary, exercising, nutritional  consult increased physical activity as tolerated   -Walking as tolerated    -She was advised to increase fluids ( 5-7  glasses of fluid daily), limit caffeine, avoid cheese products, increase dietary fiber, increase activity and exercise as tolerated and relax regularly and enjoy meals   -Continue with Ms Contin along with Oxycodone for btp  -Continue with Neurontin 2400 mg   -she was advised  to avoid using too many OTC analgesics to control the headaches, avoid chocolates, increased caffeine, cheeses and MSG nitrite containing foods, cigarette smoking. To avoid bright lights, strong smells and skipping meals.   -Continue with Topamax 25 mg BID   -she was advised proper sleep hygiene-told to avoid:use of caffeine or other stimulants after noon, alcohol use near bedtime, long or frequent naps during the day, erratic sleep schedule, heavy meals near bedtime, vigorous exercise near bedtime and use of electronic devices near bedtime   -Continue with Elavil 25 mg 1-2 nightly   -[place] was obtained and reviewed  for the last one year and no indicators of drug misuse  were found. Any other controlled substance prescriptions  seen on the record have been accounted for, I am aware of the patient receiving these medications. Livia Mcqueen [place] record will be rechecked as part of office protocol.    -Interm history reviewed    Ms. Yany Hoff will be prescribed  the medications  listed below which are for treatment of her presenting  medical problems which for this visit include:   Diagnoses of Chronic pain syndrome, Fibromyalgia, Degeneration of lumbar or lumbosacral intervertebral disc, Primary osteoarthritis of both knees, Arthritis, Chronic migraine without aura without status migrainosus, not intractable, Mood disorder ([address] Primary insomnia, and Encounter for long-term (current) use of high-risk medication were pertinent to this visit. Medications/orders associated with this visit:    Current Outpatient Medications   Medication Sig Dispense Refill    morphine (MS CONTIN) 30 MG extended release tablet Take 1 tablet by mouth daily for 30 days. 30 tablet 0    oxyCODONE (ROXICODONE) 5 MG immediate release tablet Take 1 tablet by mouth every 6 hours as needed for Pain (max 3 per day) for up to 30 days. 90 tablet 0    topiramate (TOPAMAX) 25 MG tablet Take 1 tablet by mouth 2 times daily 60 tablet 1    diclofenac (VOLTAREN) 75 MG EC tablet Take 1 tablet by mouth daily 90 tablet 0    amitriptyline (ELAVIL) 25 MG tablet Take 1-2 tablets by mouth nightly 180 tablet 0    gabapentin (NEURONTIN) 600 MG tablet Take 1 tablet po in Am, take 1 tablet po in afternoon, take 2 tablets po in Pm 360 tablet 0    ketoconazole (NIZORAL) 2 % shampoo WASH SCALP THREE TIMES PER WEEK 120 mL 3    FLUoxetine (PROZAC) 40 MG capsule       Prenatal Vit-Fe Fumarate-FA (PNV PRENATAL PLUS MULTIVITAMIN) 27-1 MG TABS       Prenatal Vit-DSS-Fe Cbn-FA (VIRT-CHAN GT) 90-1 MG TABS       omeprazole (PRILOSEC) 40 MG capsule Take 40 mg by mouth daily. KLOR-CON M20 20 MEQ tablet TAKE TWO TABLETS BY MOUTH TWICE A  tablet 3    VIVELLE-DOT 0.05 MG/24HR APPLY 1 PATCH TO SKIN TWICE WEEKLY 8 patch 11    furosemide (LASIX) 20 MG tablet Take 20 mg by mouth 3 times daily. therapeutic multivitamin-minerals (THERAGRAN-M) tablet Take 1 tablet by mouth daily. No current facility-administered medications for this visit.         Goals of current treatment regimen include improvement in pain, restoration of functioning- with focus on improvement in physical performance, general activity, work or disability,emotional distress, health care utilization and  decreased medication consumption. Will continue to monitor progress towards achieving/maintaining therapeutic goals with special emphasis on  1. Improvement in perceived interfernce  of pain with ADL's. Ability to do home exercises independently. Ability to do household chores indoor and/or outdoor work and social and leisure activities. To increase flexibility/ROM, strength and endurance. Improve psychosocial and physical functioning.- she is showing progression towards this treatment goal with the current regimen. 2. Improving sleep to 6-7 hours a night. Improve mood/ anxiety and depression symptoms such as crying spells, low energy, problems with concentration, motivation. - she is showing progression towards this treatment goal with the current regimen. 3. Reduction of reliance on opioid analgesia/more appropriate opioid use. - she is showing progression towards this treatment goal with the current regimen. Risks and benefits of the medications and other alternative treatments have been/were  discussed with the patient. Any questions on the  common side effects of these medications were also answered. She was advised against drinking alcohol with the narcotic pain medicines, advised against driving or handling machinery when  starting or adjusting the dose of medicines, feeling groggy or drowsy, or if having any cognitive issues related to the current medications. Sheis fully aware of the risk of overdose and death, if medicines are misused and not taken as prescribed. If she develops new symptoms or if the symptoms worsen, she was told to call the office. .  Thank you for allowing me to participate in the care of this patient.     Mohan Vincent MD    Cc: Ruben Owen MD

## 2023-01-24 ENCOUNTER — HOSPITAL ENCOUNTER (OUTPATIENT)
Dept: MAMMOGRAPHY | Age: 68
Discharge: HOME | End: 2023-01-24
Payer: MEDICARE

## 2023-01-24 DIAGNOSIS — Z12.39 SCREENING BREAST EXAMINATION: ICD-10-CM

## 2023-01-24 PROCEDURE — 77063 BREAST TOMOSYNTHESIS BI: CPT

## 2023-02-02 ENCOUNTER — TELEPHONE (OUTPATIENT)
Dept: PAIN MANAGEMENT | Age: 68
End: 2023-02-02

## 2023-02-02 ENCOUNTER — OFFICE VISIT (OUTPATIENT)
Dept: PAIN MANAGEMENT | Age: 68
End: 2023-02-02
Payer: MEDICARE

## 2023-02-02 VITALS
BODY MASS INDEX: 39.69 KG/M2 | WEIGHT: 217 LBS | HEART RATE: 68 BPM | SYSTOLIC BLOOD PRESSURE: 122 MMHG | DIASTOLIC BLOOD PRESSURE: 70 MMHG

## 2023-02-02 DIAGNOSIS — M51.37 DEGENERATION OF LUMBAR OR LUMBOSACRAL INTERVERTEBRAL DISC: ICD-10-CM

## 2023-02-02 DIAGNOSIS — G89.4 CHRONIC PAIN SYNDROME: ICD-10-CM

## 2023-02-02 DIAGNOSIS — M17.0 PRIMARY OSTEOARTHRITIS OF BOTH KNEES: ICD-10-CM

## 2023-02-02 DIAGNOSIS — M79.7 FIBROMYALGIA: ICD-10-CM

## 2023-02-02 DIAGNOSIS — M19.90 ARTHRITIS: ICD-10-CM

## 2023-02-02 PROCEDURE — 1123F ACP DISCUSS/DSCN MKR DOCD: CPT | Performed by: INTERNAL MEDICINE

## 2023-02-02 PROCEDURE — 99213 OFFICE O/P EST LOW 20 MIN: CPT | Performed by: INTERNAL MEDICINE

## 2023-02-02 RX ORDER — MORPHINE SULFATE 30 MG/1
30 TABLET, FILM COATED, EXTENDED RELEASE ORAL DAILY
Qty: 28 TABLET | Refills: 0 | Status: SHIPPED | OUTPATIENT
Start: 2023-02-02 | End: 2023-03-02

## 2023-02-02 RX ORDER — TOPIRAMATE 25 MG/1
25 TABLET ORAL 2 TIMES DAILY
Qty: 60 TABLET | Refills: 1 | Status: SHIPPED | OUTPATIENT
Start: 2023-02-02

## 2023-02-02 RX ORDER — OXYCODONE HYDROCHLORIDE 5 MG/1
5 TABLET ORAL EVERY 6 HOURS PRN
Qty: 84 TABLET | Refills: 0 | Status: SHIPPED | OUTPATIENT
Start: 2023-02-02 | End: 2023-03-02

## 2023-02-02 NOTE — PROGRESS NOTES
Linda Lord  1955  [id number]      HISTORY OF PRESENT ILLNESS:  Ms. Emir Armstrong is a 77 y.o. female returns for a follow up visit for pain management  She has a diagnosis of   1. Chronic pain syndrome    2. Fibromyalgia    3. Degeneration of lumbar or lumbosacral intervertebral disc    4. Primary osteoarthritis of both knees    5. Arthritis    6. Chronic migraine without aura without status migrainosus, not intractable    . She complains of pain in the  bilateral shoulders, bilateral elbows, bilateral knees, bilateral ankles with radiation to the bilateral hands, bilateral feet  She rates the pain 5/10 and describes it as sharp. Current treatment regimen has helped relieve about 50% of the pain. She denies any side effects from the current pain regimen. Patient reports that since the last follow up visit the physical functioning is unchanged, family/social relationships are unchanged, mood is unchanged sleep patterns are unchanged, and that the overall functioning is unchanged. Patient denies misusing/abusing her narcotic pain medications or using any illegal drugs. There are No indicators for possible drug abuse, addiction or diversion problems. Patient states she has been doing fair, she is managing okay with the medications. Ms. Emir Armstrong mentions she is using Ms Contin along with Oxycodone for btp which is helping with pain. She says her back hurts the most along with the legs. Patient denies any constipation symptoms. ALLERGIES: Patients list of allergies were reviewed     MEDICATIONS: Ms. Emir Armstrong list of medications were reviewed. Her current medications are   Outpatient Medications Prior to Visit   Medication Sig Dispense Refill    morphine (MS CONTIN) 30 MG extended release tablet Take 1 tablet by mouth daily for 28 days.  Max Daily Amount: 30 mg 28 tablet 0    oxyCODONE (ROXICODONE) 5 MG immediate release tablet Take 1 tablet by mouth every 6 hours as needed for Pain (max 3 per day) for up to 28 days. 84 tablet 0    topiramate (TOPAMAX) 25 MG tablet Take 1 tablet by mouth 2 times daily 60 tablet 1    diclofenac (VOLTAREN) 75 MG EC tablet Take 1 tablet by mouth daily 90 tablet 0    amitriptyline (ELAVIL) 25 MG tablet Take 1-2 tablets by mouth nightly 180 tablet 0    ketoconazole (NIZORAL) 2 % shampoo WASH SCALP THREE TIMES PER WEEK 120 mL 3    FLUoxetine (PROZAC) 40 MG capsule       Prenatal Vit-Fe Fumarate-FA (PNV PRENATAL PLUS MULTIVITAMIN) 27-1 MG TABS       Prenatal Vit-DSS-Fe Cbn-FA (VIRT-CHAN GT) 90-1 MG TABS       omeprazole (PRILOSEC) 40 MG capsule Take 40 mg by mouth daily. KLOR-CON M20 20 MEQ tablet TAKE TWO TABLETS BY MOUTH TWICE A  tablet 3    VIVELLE-DOT 0.05 MG/24HR APPLY 1 PATCH TO SKIN TWICE WEEKLY 8 patch 11    furosemide (LASIX) 20 MG tablet Take 20 mg by mouth 3 times daily. therapeutic multivitamin-minerals (THERAGRAN-M) tablet Take 1 tablet by mouth daily. gabapentin (NEURONTIN) 600 MG tablet Take 1 tablet po in Am, take 1 tablet po in afternoon, take 2 tablets po in Pm 360 tablet 0     No facility-administered medications prior to visit. REVIEW OF SYSTEMS:    Respiratory: Negative for apnea, chest tightness and shortness of breath or change in baseline breathing. PHYSICAL EXAM:   Nursing note and vitals reviewed. /70   Pulse 68   Wt 217 lb (98.4 kg)   BMI 39.69 kg/m²   Constitutional: She appears well-developed and well-nourished. No acute distress. Cardiovascular: Normal rate, regular rhythm, normal heart sounds, and does not have murmur. Pulmonary/Chest: Effort normal. No respiratory distress. She does not have wheezes in the lung fields. She has no rales. Neurological/Psychiatric:She is alert and oriented to person, place, and time. Coordination is  normal.  Her mood isAppropriate and affect is Neutral/Euthymic(normal) . Her  Other: trace edema   IMPRESSION:   1. Chronic pain syndrome    2. Fibromyalgia    3. Degeneration of lumbar or lumbosacral intervertebral disc    4. Primary osteoarthritis of both knees    5. Arthritis        PLAN:  Informed verbal consent was obtained  -Continue with Ms Contin along with Oxycodone 2-3 per day for btp  -She was advised weight reduction, diet changes- 800-1200 idalia diet, diet diary, exercising, nutritional  consult increased physical activity as tolerated   -Will get PA for Ms Contin   -Continue with all other adjuvant medications as before    Current Outpatient Medications   Medication Sig Dispense Refill    morphine (MS CONTIN) 30 MG extended release tablet Take 1 tablet by mouth daily for 28 days. Max Daily Amount: 30 mg 28 tablet 0    oxyCODONE (ROXICODONE) 5 MG immediate release tablet Take 1 tablet by mouth every 6 hours as needed for Pain (max 3 per day) for up to 28 days. 84 tablet 0    topiramate (TOPAMAX) 25 MG tablet Take 1 tablet by mouth 2 times daily 60 tablet 1    diclofenac (VOLTAREN) 75 MG EC tablet Take 1 tablet by mouth daily 90 tablet 0    amitriptyline (ELAVIL) 25 MG tablet Take 1-2 tablets by mouth nightly 180 tablet 0    ketoconazole (NIZORAL) 2 % shampoo WASH SCALP THREE TIMES PER WEEK 120 mL 3    FLUoxetine (PROZAC) 40 MG capsule       Prenatal Vit-Fe Fumarate-FA (PNV PRENATAL PLUS MULTIVITAMIN) 27-1 MG TABS       Prenatal Vit-DSS-Fe Cbn-FA (VIRT-CHAN GT) 90-1 MG TABS       omeprazole (PRILOSEC) 40 MG capsule Take 40 mg by mouth daily. KLOR-CON M20 20 MEQ tablet TAKE TWO TABLETS BY MOUTH TWICE A  tablet 3    VIVELLE-DOT 0.05 MG/24HR APPLY 1 PATCH TO SKIN TWICE WEEKLY 8 patch 11    furosemide (LASIX) 20 MG tablet Take 20 mg by mouth 3 times daily. therapeutic multivitamin-minerals (THERAGRAN-M) tablet Take 1 tablet by mouth daily. gabapentin (NEURONTIN) 600 MG tablet Take 1 tablet po in Am, take 1 tablet po in afternoon, take 2 tablets po in Pm 360 tablet 0     No current facility-administered medications for this visit.      I will continue her current medication regimen  which is part of the above treatment schedule. It has been helping with Ms. Harris's chronic  medical problems which for this visit include:   Diagnoses of Chronic pain syndrome, Fibromyalgia, Degeneration of lumbar or lumbosacral intervertebral disc, Primary osteoarthritis of both knees, Arthritis, and Chronic migraine without aura without status migrainosus, not intractable were pertinent to this visit. Risks and benefits of the medications and other alternative treatments  including no treatment were discussed with the patient. The common side effects of these medications were also explained to the patient. Informed verbal consent was obtained. Goals of current treatment regimen include improvement in pain, restoration of functioning- with focus on improvement in physical performance, general activity, work or disability,emotional distress, health care utilization and  decreased medication consumption. Will continue to monitor progress towards achieving/maintaining therapeutic goals with special emphasis on  1. Improvement in perceived interfernce  of pain with ADL's. Ability to do home exercises independently. Ability to do household chores indoor and/or outdoor work and social and leisure activities. Improve psychosocial and physical functioning. - she is showing progression towards this treatment goal with the current regimen. She was advised against drinking alcohol with the narcotic pain medicines, advised against driving or handling machinery while adjusting the dose of medicines or if having cognitive  issues related to the current medications. Risk of overdose and death, if medicines not taken as prescribed, were also discussed. If the patient develops new symptoms or if the symptoms worsen, the patient should call the office.     While transcribing every attempt was made to maintain the accuracy of the note in terms of it's contents,there may have been some errors made inadvertently. Thank you for allowing me to participate in the care of this patient.     Narcisa Sahu MD.    Cc: Yesica Harmon MD

## 2023-02-02 NOTE — TELEPHONE ENCOUNTER
Submitted PA for OXYCODONE 5 MG  Via [facility] Key: [id number] STATUS: \"Available without authorization. \"      The patient was notified by phone.

## 2023-02-02 NOTE — TELEPHONE ENCOUNTER
Submitted PA for MSER  Via [facility] Key: [id number] STATUS: \" Approved, Coverage Starts on: 11/3/2022 12:00:00 AM, Coverage Ends on: 8/1/2023 \"    The patient was notified by phone.

## 2023-03-02 ENCOUNTER — OFFICE VISIT (OUTPATIENT)
Dept: PAIN MANAGEMENT | Age: 68
End: 2023-03-02
Payer: MEDICARE

## 2023-03-02 VITALS
DIASTOLIC BLOOD PRESSURE: 77 MMHG | HEIGHT: 62 IN | SYSTOLIC BLOOD PRESSURE: 124 MMHG | WEIGHT: 217 LBS | BODY MASS INDEX: 39.93 KG/M2 | HEART RATE: 73 BPM | OXYGEN SATURATION: 96 %

## 2023-03-02 DIAGNOSIS — Z51.81 ENCOUNTER FOR THERAPEUTIC DRUG MONITORING: ICD-10-CM

## 2023-03-02 DIAGNOSIS — M19.90 ARTHRITIS: ICD-10-CM

## 2023-03-02 DIAGNOSIS — G89.4 CHRONIC PAIN SYNDROME: ICD-10-CM

## 2023-03-02 DIAGNOSIS — M79.7 FIBROMYALGIA: ICD-10-CM

## 2023-03-02 DIAGNOSIS — M51.37 DEGENERATION OF LUMBAR OR LUMBOSACRAL INTERVERTEBRAL DISC: ICD-10-CM

## 2023-03-02 DIAGNOSIS — M17.0 PRIMARY OSTEOARTHRITIS OF BOTH KNEES: ICD-10-CM

## 2023-03-02 PROCEDURE — 99213 OFFICE O/P EST LOW 20 MIN: CPT | Performed by: INTERNAL MEDICINE

## 2023-03-02 PROCEDURE — 1123F ACP DISCUSS/DSCN MKR DOCD: CPT | Performed by: INTERNAL MEDICINE

## 2023-03-02 RX ORDER — MORPHINE SULFATE 30 MG/1
30 TABLET, FILM COATED, EXTENDED RELEASE ORAL DAILY
Qty: 28 TABLET | Refills: 0 | Status: SHIPPED | OUTPATIENT
Start: 2023-03-02 | End: 2023-03-30

## 2023-03-02 RX ORDER — OXYCODONE HYDROCHLORIDE 5 MG/1
5 TABLET ORAL 3 TIMES DAILY PRN
Qty: 84 TABLET | Refills: 0 | Status: SHIPPED | OUTPATIENT
Start: 2023-03-02 | End: 2023-03-30

## 2023-03-02 RX ORDER — TOPIRAMATE 25 MG/1
25 TABLET ORAL 2 TIMES DAILY
Qty: 60 TABLET | Refills: 1 | Status: SHIPPED | OUTPATIENT
Start: 2023-03-02

## 2023-03-02 NOTE — PROGRESS NOTES
Yary Shafer  1955  [id number]      HISTORY OF PRESENT ILLNESS:  Ms. Geronimo Inman is a 77 y.o. female returns for a follow up visit for pain management  She has a diagnosis of   1. Chronic pain syndrome    2. Fibromyalgia    3. Degeneration of lumbar or lumbosacral intervertebral disc    4. Primary osteoarthritis of both knees    5. Arthritis    6. Chronic migraine without aura without status migrainosus, not intractable    7. Mood disorder ([facility] [address]    8. Primary insomnia    9. Encounter for therapeutic drug monitoring    . She complains of pain in the  neck, Low back, Bilateral shoulders, bilateral elbows, Bilateral hands, bilateral knees, bilateral feet   She rates the pain 6/10 and describes it as sharp, aching. Current treatment regimen has helped relieve about 60% of the pain. She denies any side effects from the current pain regimen. Patient reports that since the last follow up visit the physical functioning is unchanged, family/social relationships are unchanged, mood is unchanged sleep patterns are unchanged, and that the overall functioning is unchanged. Patient denies misusing/abusing her narcotic pain medications or using any illegal drugs. There are No indicators for possible drug abuse, addiction or diversion problems. Patient states she has been doing fair and the pain has been manageable. She mentions she has been complaint with her regimen. She says she is using Ms. Contin with Oxycodone 3 per day. She reports she is managing her ADLs and house chores. ALLERGIES: Patients list of allergies were reviewed     MEDICATIONS: Ms. Geronimo Inman list of medications were reviewed. Her current medications are   Outpatient Medications Prior to Visit   Medication Sig Dispense Refill    morphine (MS CONTIN) 30 MG extended release tablet Take 1 tablet by mouth daily for 28 days.  28 tablet 0    oxyCODONE (ROXICODONE) 5 MG immediate release tablet Take 1 tablet by mouth every 6 hours as needed for Pain (max 3 per day) for up to 28 days. 84 tablet 0    topiramate (TOPAMAX) 25 MG tablet Take 1 tablet by mouth 2 times daily 60 tablet 1    diclofenac (VOLTAREN) 75 MG EC tablet Take 1 tablet by mouth daily 90 tablet 0    amitriptyline (ELAVIL) 25 MG tablet Take 1-2 tablets by mouth nightly 180 tablet 0    gabapentin (NEURONTIN) 600 MG tablet Take 1 tablet po in Am, take 1 tablet po in afternoon, take 2 tablets po in Pm 360 tablet 0    ketoconazole (NIZORAL) 2 % shampoo WASH SCALP THREE TIMES PER WEEK 120 mL 3    FLUoxetine (PROZAC) 40 MG capsule       Prenatal Vit-Fe Fumarate-FA (PNV PRENATAL PLUS MULTIVITAMIN) 27-1 MG TABS       Prenatal Vit-DSS-Fe Cbn-FA (VIRT-CHAN GT) 90-1 MG TABS       omeprazole (PRILOSEC) 40 MG capsule Take 40 mg by mouth daily. KLOR-CON M20 20 MEQ tablet TAKE TWO TABLETS BY MOUTH TWICE A  tablet 3    VIVELLE-DOT 0.05 MG/24HR APPLY 1 PATCH TO SKIN TWICE WEEKLY 8 patch 11    furosemide (LASIX) 20 MG tablet Take 20 mg by mouth 3 times daily. therapeutic multivitamin-minerals (THERAGRAN-M) tablet Take 1 tablet by mouth daily. No facility-administered medications prior to visit. REVIEW OF SYSTEMS:    Respiratory: Negative for apnea, chest tightness and shortness of breath or change in baseline breathing. PHYSICAL EXAM:   Nursing note and vitals reviewed. /77   Pulse 73   Ht 5' 2\" (1.575 m)   Wt 217 lb (98.4 kg)   SpO2 96%   BMI 39.69 kg/m²   Constitutional: She appears well-developed and well-nourished. No acute distress. Cardiovascular: Normal rate, regular rhythm, normal heart sounds, and does not have murmur. Pulmonary/Chest: Effort normal. No respiratory distress. She does not have wheezes in the lung fields. She has no rales. Neurological/Psychiatric:She is alert and oriented to person, place, and time. Coordination is  normal.  Her mood isAppropriate and affect is Neutral/Euthymic(normal) . IMPRESSION:   1. Chronic pain syndrome    2. Fibromyalgia    3. Degeneration of lumbar or lumbosacral intervertebral disc    4. Primary osteoarthritis of both knees    5. Arthritis    6. Encounter for therapeutic drug monitoring        PLAN:  Informed verbal consent was obtained  -ROM/Stretching exercises as advised   -She was advised weight reduction, diet changes- 800-1200 idalia diet, diet diary, exercising, nutritional  consult increased physical activity as tolerated   -Continue with Ms. Contin with Oxycodone 3 per day   -She was advised to increase fluids ( 5-7  glasses of fluid daily), limit caffeine, avoid cheese products, increase dietary fiber, increase activity and exercise as tolerated and relax regularly and enjoy meals   -Urine drug screen with GC/MS for opiates and drugs of abuse was ordered and will follow up on results. Current Outpatient Medications   Medication Sig Dispense Refill    morphine (MS CONTIN) 30 MG extended release tablet Take 1 tablet by mouth daily for 28 days. 28 tablet 0    oxyCODONE (ROXICODONE) 5 MG immediate release tablet Take 1 tablet by mouth every 6 hours as needed for Pain (max 3 per day) for up to 28 days. 84 tablet 0    topiramate (TOPAMAX) 25 MG tablet Take 1 tablet by mouth 2 times daily 60 tablet 1    diclofenac (VOLTAREN) 75 MG EC tablet Take 1 tablet by mouth daily 90 tablet 0    amitriptyline (ELAVIL) 25 MG tablet Take 1-2 tablets by mouth nightly 180 tablet 0    gabapentin (NEURONTIN) 600 MG tablet Take 1 tablet po in Am, take 1 tablet po in afternoon, take 2 tablets po in Pm 360 tablet 0    ketoconazole (NIZORAL) 2 % shampoo WASH SCALP THREE TIMES PER WEEK 120 mL 3    FLUoxetine (PROZAC) 40 MG capsule       Prenatal Vit-Fe Fumarate-FA (PNV PRENATAL PLUS MULTIVITAMIN) 27-1 MG TABS       Prenatal Vit-DSS-Fe Cbn-FA (VIRT-CHAN GT) 90-1 MG TABS       omeprazole (PRILOSEC) 40 MG capsule Take 40 mg by mouth daily.       KLOR-CON M20 20 MEQ tablet TAKE TWO TABLETS BY MOUTH TWICE A  tablet 3    VIVELLE-DOT 0.05 MG/24HR APPLY 1 PATCH TO SKIN TWICE WEEKLY 8 patch 11    furosemide (LASIX) 20 MG tablet Take 20 mg by mouth 3 times daily. therapeutic multivitamin-minerals (THERAGRAN-M) tablet Take 1 tablet by mouth daily. No current facility-administered medications for this visit. I will continue her current medication regimen  which is part of the above treatment schedule. It has been helping with Ms. Harris's chronic  medical problems which for this visit include:   Diagnoses of Chronic pain syndrome, Fibromyalgia, Degeneration of lumbar or lumbosacral intervertebral disc, Primary osteoarthritis of both knees, Arthritis, Chronic migraine without aura without status migrainosus, not intractable, Mood disorder ([address] Primary insomnia, and Encounter for therapeutic drug monitoring were pertinent to this visit. Risks and benefits of the medications and other alternative treatments  including no treatment were discussed with the patient. The common side effects of these medications were also explained to the patient. Informed verbal consent was obtained. Goals of current treatment regimen include improvement in pain, restoration of functioning- with focus on improvement in physical performance, general activity, work or disability,emotional distress, health care utilization and  decreased medication consumption. Will continue to monitor progress towards achieving/maintaining therapeutic goals with special emphasis on  1. Improvement in perceived interfernce  of pain with ADL's. Ability to do home exercises independently. Ability to do household chores indoor and/or outdoor work and social and leisure activities. Improve psychosocial and physical functioning. - she is showing progression towards this treatment goal with the current regimen.        She was advised against drinking alcohol with the narcotic pain medicines, advised against driving or handling machinery while adjusting the dose of medicines or if having cognitive  issues related to the current medications.Risk of overdose and death, if medicines not taken as prescribed, were also discussed. If the patient develops new symptoms or if the symptoms worsen, the patient should call the office.    While transcribing every attempt was made to maintain the accuracy of the note in terms of it's contents,there may have been some errors made inadvertently.  Thank you for allowing me to participate in the care of this patient.    Rodrigo Rowe MD.    Cc: Dr.Anthony MALVIN Flanagan MD

## 2023-03-27 ENCOUNTER — OFFICE VISIT (OUTPATIENT)
Dept: PAIN MANAGEMENT | Age: 68
End: 2023-03-27
Payer: MEDICARE

## 2023-03-27 VITALS
DIASTOLIC BLOOD PRESSURE: 69 MMHG | BODY MASS INDEX: 39.18 KG/M2 | HEART RATE: 72 BPM | WEIGHT: 214.2 LBS | OXYGEN SATURATION: 94 % | SYSTOLIC BLOOD PRESSURE: 109 MMHG

## 2023-03-27 DIAGNOSIS — M51.37 DEGENERATION OF LUMBAR OR LUMBOSACRAL INTERVERTEBRAL DISC: ICD-10-CM

## 2023-03-27 DIAGNOSIS — M79.7 FIBROMYALGIA: ICD-10-CM

## 2023-03-27 DIAGNOSIS — G89.4 CHRONIC PAIN SYNDROME: ICD-10-CM

## 2023-03-27 DIAGNOSIS — M19.90 ARTHRITIS: ICD-10-CM

## 2023-03-27 DIAGNOSIS — M17.0 PRIMARY OSTEOARTHRITIS OF BOTH KNEES: ICD-10-CM

## 2023-03-27 PROCEDURE — 1123F ACP DISCUSS/DSCN MKR DOCD: CPT | Performed by: INTERNAL MEDICINE

## 2023-03-27 PROCEDURE — 99213 OFFICE O/P EST LOW 20 MIN: CPT | Performed by: INTERNAL MEDICINE

## 2023-03-27 RX ORDER — MORPHINE SULFATE 30 MG/1
30 TABLET, FILM COATED, EXTENDED RELEASE ORAL DAILY
Qty: 28 TABLET | Refills: 0 | Status: SHIPPED | OUTPATIENT
Start: 2023-03-27 | End: 2023-04-24

## 2023-03-27 RX ORDER — GABAPENTIN 600 MG/1
TABLET ORAL
Qty: 360 TABLET | Refills: 0 | Status: SHIPPED | OUTPATIENT
Start: 2023-03-27 | End: 2023-07-17

## 2023-03-27 RX ORDER — TOPIRAMATE 25 MG/1
25 TABLET ORAL 2 TIMES DAILY
Qty: 60 TABLET | Refills: 1 | Status: SHIPPED | OUTPATIENT
Start: 2023-03-27

## 2023-03-27 RX ORDER — DICLOFENAC SODIUM 75 MG/1
75 TABLET, DELAYED RELEASE ORAL DAILY
Qty: 90 TABLET | Refills: 0 | Status: SHIPPED | OUTPATIENT
Start: 2023-03-27

## 2023-03-27 RX ORDER — OXYCODONE HYDROCHLORIDE 5 MG/1
5 TABLET ORAL 3 TIMES DAILY PRN
Qty: 84 TABLET | Refills: 0 | Status: SHIPPED | OUTPATIENT
Start: 2023-03-27 | End: 2023-04-24

## 2023-03-27 RX ORDER — AMITRIPTYLINE HYDROCHLORIDE 25 MG/1
25-50 TABLET, FILM COATED ORAL NIGHTLY
Qty: 180 TABLET | Refills: 0 | Status: SHIPPED | OUTPATIENT
Start: 2023-03-27

## 2023-04-24 ENCOUNTER — OFFICE VISIT (OUTPATIENT)
Dept: PAIN MANAGEMENT | Age: 68
End: 2023-04-24
Payer: MEDICARE

## 2023-04-24 VITALS
HEART RATE: 67 BPM | DIASTOLIC BLOOD PRESSURE: 22 MMHG | OXYGEN SATURATION: 99 % | WEIGHT: 215.8 LBS | SYSTOLIC BLOOD PRESSURE: 125 MMHG | BODY MASS INDEX: 39.47 KG/M2

## 2023-04-24 DIAGNOSIS — Z51.81 ENCOUNTER FOR THERAPEUTIC DRUG MONITORING: ICD-10-CM

## 2023-04-24 DIAGNOSIS — M17.0 PRIMARY OSTEOARTHRITIS OF BOTH KNEES: ICD-10-CM

## 2023-04-24 DIAGNOSIS — M19.90 ARTHRITIS: ICD-10-CM

## 2023-04-24 DIAGNOSIS — M51.37 DEGENERATION OF LUMBAR OR LUMBOSACRAL INTERVERTEBRAL DISC: ICD-10-CM

## 2023-04-24 DIAGNOSIS — G89.4 CHRONIC PAIN SYNDROME: ICD-10-CM

## 2023-04-24 DIAGNOSIS — M79.7 FIBROMYALGIA: ICD-10-CM

## 2023-04-24 DIAGNOSIS — G43.709 CHRONIC MIGRAINE WITHOUT AURA WITHOUT STATUS MIGRAINOSUS, NOT INTRACTABLE: ICD-10-CM

## 2023-04-24 DIAGNOSIS — F51.01 PRIMARY INSOMNIA: ICD-10-CM

## 2023-04-24 DIAGNOSIS — F39 MOOD DISORDER (HCC): ICD-10-CM

## 2023-04-24 PROCEDURE — 99214 OFFICE O/P EST MOD 30 MIN: CPT | Performed by: INTERNAL MEDICINE

## 2023-04-24 PROCEDURE — 1123F ACP DISCUSS/DSCN MKR DOCD: CPT | Performed by: INTERNAL MEDICINE

## 2023-04-24 RX ORDER — MORPHINE SULFATE 30 MG/1
30 TABLET, FILM COATED, EXTENDED RELEASE ORAL DAILY
Qty: 28 TABLET | Refills: 0 | Status: SHIPPED | OUTPATIENT
Start: 2023-04-24 | End: 2023-05-22

## 2023-04-24 RX ORDER — OXYCODONE HYDROCHLORIDE 5 MG/1
5 TABLET ORAL 3 TIMES DAILY PRN
Qty: 84 TABLET | Refills: 0 | Status: SHIPPED | OUTPATIENT
Start: 2023-04-24 | End: 2023-05-22

## 2023-04-24 RX ORDER — TOPIRAMATE 25 MG/1
25 TABLET ORAL 2 TIMES DAILY
Qty: 60 TABLET | Refills: 1 | Status: SHIPPED | OUTPATIENT
Start: 2023-04-24

## 2023-04-25 NOTE — PROGRESS NOTES
Juliane Zambrano  1955  [id number]    HISTORY OF PRESENT ILLNESS:  Ms. Rocío Kenney is a 77 y.o. female returns for a follow up visit for multiple medical problems. Her  presenting problems are   1. Chronic pain syndrome    2. Primary osteoarthritis of both knees    3. Chronic migraine without aura without status migrainosus, not intractable    4. Degeneration of lumbar or lumbosacral intervertebral disc    5. Arthritis    6. Mood disorder ([address]    7. Fibromyalgia    8. Encounter for therapeutic drug monitoring    9. Primary insomnia    . As per information/history obtained from the PADT(patient assessment and documentation tool) -  She complains of pain in the neck and lower back with radiation to the shoulders Bilateral, elbows Bilateral, hands Bilateral, knees Bilateral, ankles Bilateral, and feet Bilateral She rates the pain 6/10 and describes it as sharp. Pain is made worse by: movement, walking, standing, sitting, bending, lifting. Current treatment regimen has helped relieve about 60% of the pain. She denies side effects from the current pain regimen. Patient reports that since the last follow up visit the physical functioning is unchanged, family/social relationships are unchanged, mood is unchanged and sleep patterns are unchanged, and that the overall functioning is unchanged. Patient denies neurological bowel or bladder. Patient denies misusing/abusing her narcotic pain medications or using any illegal drugs. There are No indicators for possible drug abuse, addiction or diversion problems. Upon obtaining the medical history from Ms. Rocío Kenney regarding today's office visit for her presenting problems, Patient states she has been doing fair, pain has been manageable with the medications. She says she is using Ms. Contin with Oxycodone for btp. She mentions she had labs done recently. she states that the medications are helping with the headaches  and they are tolerable.   Denies nausea,

## 2023-05-22 ENCOUNTER — OFFICE VISIT (OUTPATIENT)
Dept: PAIN MANAGEMENT | Age: 68
End: 2023-05-22
Payer: MEDICARE

## 2023-05-22 VITALS
HEART RATE: 77 BPM | WEIGHT: 211 LBS | SYSTOLIC BLOOD PRESSURE: 143 MMHG | BODY MASS INDEX: 38.59 KG/M2 | DIASTOLIC BLOOD PRESSURE: 77 MMHG

## 2023-05-22 DIAGNOSIS — Z51.81 ENCOUNTER FOR THERAPEUTIC DRUG MONITORING: ICD-10-CM

## 2023-05-22 DIAGNOSIS — M79.7 FIBROMYALGIA: ICD-10-CM

## 2023-05-22 DIAGNOSIS — M19.90 ARTHRITIS: ICD-10-CM

## 2023-05-22 DIAGNOSIS — M17.0 PRIMARY OSTEOARTHRITIS OF BOTH KNEES: ICD-10-CM

## 2023-05-22 DIAGNOSIS — M51.37 DEGENERATION OF LUMBAR OR LUMBOSACRAL INTERVERTEBRAL DISC: ICD-10-CM

## 2023-05-22 DIAGNOSIS — G89.4 CHRONIC PAIN SYNDROME: ICD-10-CM

## 2023-05-22 PROCEDURE — 99213 OFFICE O/P EST LOW 20 MIN: CPT | Performed by: INTERNAL MEDICINE

## 2023-05-22 PROCEDURE — 1123F ACP DISCUSS/DSCN MKR DOCD: CPT | Performed by: INTERNAL MEDICINE

## 2023-05-22 RX ORDER — OXYCODONE HYDROCHLORIDE 5 MG/1
5 TABLET ORAL 3 TIMES DAILY PRN
Qty: 84 TABLET | Refills: 0 | Status: SHIPPED | OUTPATIENT
Start: 2023-05-22 | End: 2023-06-19

## 2023-05-22 RX ORDER — MORPHINE SULFATE 30 MG/1
30 TABLET, FILM COATED, EXTENDED RELEASE ORAL DAILY
Qty: 28 TABLET | Refills: 0 | Status: SHIPPED | OUTPATIENT
Start: 2023-05-22 | End: 2023-06-19

## 2023-05-22 RX ORDER — TOPIRAMATE 25 MG/1
25 TABLET ORAL 2 TIMES DAILY
Qty: 60 TABLET | Refills: 1 | Status: SHIPPED | OUTPATIENT
Start: 2023-05-22

## 2023-05-22 RX ORDER — GABAPENTIN 600 MG/1
TABLET ORAL
Qty: 360 TABLET | Refills: 0 | Status: CANCELLED | OUTPATIENT
Start: 2023-05-22 | End: 2023-09-11

## 2023-05-22 RX ORDER — DICLOFENAC SODIUM 75 MG/1
75 TABLET, DELAYED RELEASE ORAL DAILY
Qty: 90 TABLET | Refills: 0 | Status: CANCELLED | OUTPATIENT
Start: 2023-05-22

## 2023-05-22 RX ORDER — AMITRIPTYLINE HYDROCHLORIDE 25 MG/1
25-50 TABLET, FILM COATED ORAL NIGHTLY
Qty: 180 TABLET | Refills: 0 | Status: CANCELLED | OUTPATIENT
Start: 2023-05-22

## 2023-05-28 DIAGNOSIS — M51.37 DEGENERATION OF LUMBAR OR LUMBOSACRAL INTERVERTEBRAL DISC: ICD-10-CM

## 2023-05-28 DIAGNOSIS — M17.0 PRIMARY OSTEOARTHRITIS OF BOTH KNEES: ICD-10-CM

## 2023-05-28 DIAGNOSIS — M19.90 ARTHRITIS: ICD-10-CM

## 2023-05-28 DIAGNOSIS — G89.4 CHRONIC PAIN SYNDROME: ICD-10-CM

## 2023-05-28 DIAGNOSIS — M79.7 FIBROMYALGIA: ICD-10-CM

## 2023-06-17 DIAGNOSIS — M17.0 PRIMARY OSTEOARTHRITIS OF BOTH KNEES: ICD-10-CM

## 2023-06-17 DIAGNOSIS — M19.90 ARTHRITIS: ICD-10-CM

## 2023-06-17 DIAGNOSIS — M79.7 FIBROMYALGIA: ICD-10-CM

## 2023-06-17 DIAGNOSIS — M51.37 DEGENERATION OF LUMBAR OR LUMBOSACRAL INTERVERTEBRAL DISC: ICD-10-CM

## 2023-06-17 DIAGNOSIS — G89.4 CHRONIC PAIN SYNDROME: ICD-10-CM

## 2023-06-19 ENCOUNTER — OFFICE VISIT (OUTPATIENT)
Dept: PAIN MANAGEMENT | Age: 68
End: 2023-06-19
Payer: MEDICARE

## 2023-06-19 VITALS
DIASTOLIC BLOOD PRESSURE: 74 MMHG | OXYGEN SATURATION: 95 % | WEIGHT: 213 LBS | SYSTOLIC BLOOD PRESSURE: 120 MMHG | HEART RATE: 75 BPM | BODY MASS INDEX: 38.96 KG/M2

## 2023-06-19 DIAGNOSIS — M17.0 PRIMARY OSTEOARTHRITIS OF BOTH KNEES: ICD-10-CM

## 2023-06-19 DIAGNOSIS — G89.4 CHRONIC PAIN SYNDROME: ICD-10-CM

## 2023-06-19 DIAGNOSIS — M51.37 DEGENERATION OF LUMBAR OR LUMBOSACRAL INTERVERTEBRAL DISC: ICD-10-CM

## 2023-06-19 DIAGNOSIS — M79.7 FIBROMYALGIA: ICD-10-CM

## 2023-06-19 DIAGNOSIS — M19.90 ARTHRITIS: ICD-10-CM

## 2023-06-19 PROCEDURE — 1123F ACP DISCUSS/DSCN MKR DOCD: CPT | Performed by: INTERNAL MEDICINE

## 2023-06-19 PROCEDURE — 99213 OFFICE O/P EST LOW 20 MIN: CPT | Performed by: INTERNAL MEDICINE

## 2023-06-19 RX ORDER — TOPIRAMATE 25 MG/1
25 TABLET ORAL 2 TIMES DAILY
Qty: 60 TABLET | Refills: 1 | Status: SHIPPED | OUTPATIENT
Start: 2023-06-19

## 2023-06-19 RX ORDER — OXYCODONE HYDROCHLORIDE 5 MG/1
5 TABLET ORAL 3 TIMES DAILY PRN
Qty: 84 TABLET | Refills: 0 | Status: SHIPPED | OUTPATIENT
Start: 2023-06-19 | End: 2023-07-17

## 2023-06-19 RX ORDER — MORPHINE SULFATE 30 MG/1
30 TABLET, FILM COATED, EXTENDED RELEASE ORAL DAILY
Qty: 28 TABLET | Refills: 0 | Status: SHIPPED | OUTPATIENT
Start: 2023-06-19 | End: 2023-07-17

## 2023-06-19 RX ORDER — AMITRIPTYLINE HYDROCHLORIDE 25 MG/1
25-50 TABLET, FILM COATED ORAL NIGHTLY
Qty: 180 TABLET | Refills: 0 | Status: SHIPPED | OUTPATIENT
Start: 2023-06-19

## 2023-06-19 RX ORDER — DICLOFENAC SODIUM 75 MG/1
75 TABLET, DELAYED RELEASE ORAL DAILY
Qty: 90 TABLET | Refills: 0 | Status: SHIPPED | OUTPATIENT
Start: 2023-06-19

## 2023-06-19 RX ORDER — GABAPENTIN 600 MG/1
TABLET ORAL
Qty: 360 TABLET | Refills: 0 | Status: SHIPPED | OUTPATIENT
Start: 2023-06-19 | End: 2023-10-09

## 2023-06-19 NOTE — PROGRESS NOTES
Verito Oconnell  1955  [id number]      HISTORY OF PRESENT ILLNESS:  Ms. Brandee Cervantes is a 77 y.o. female returns for a follow up visit for pain management  She has a diagnosis of   1. Chronic pain syndrome    2. Primary osteoarthritis of both knees    3. Chronic migraine without aura without status migrainosus, not intractable    4. Degeneration of lumbar or lumbosacral intervertebral disc    5. Arthritis    6. Mood disorder ([address]    7. Fibromyalgia    8. Primary insomnia    . She complains of pain in the  bilateral shoulders, bilateral elbows, lower back, bilateral hands, bilateral hips, bilateral feet  She rates the pain 6/10 and describes it as sharp, aching, burning. Current treatment regimen has helped relieve about 60% of the pain. She denies any side effects from the current pain regimen. Patient reports that since the last follow up visit the physical functioning is unchanged, family/social relationships are unchanged, mood is unchanged sleep patterns are unchanged, and that the overall functioning is unchanged. Patient denies misusing/abusing her narcotic pain medications or using any illegal drugs. There are No indicators for possible drug abuse, addiction or diversion problems. Patient states she has been doing fair, she is managing with the medications. Ms. Brandee Cervantes mentions she is using Ms Contin along with Oxycodone 3 per day for btp. Patient denies any constipation symptoms. Patient reports her weight has been stable. ALLERGIES: Patients list of allergies were reviewed     MEDICATIONS: Ms. Brandee Cervantes list of medications were reviewed. Her current medications are   Outpatient Medications Prior to Visit   Medication Sig Dispense Refill    ketoconazole (NIZORAL) 2 % shampoo WASH SCALP THREE TIMES PER WEEK 120 mL 3    FLUoxetine (PROZAC) 40 MG capsule       Prenatal Vit-Fe Fumarate-FA (PNV PRENATAL PLUS MULTIVITAMIN) 27-1 MG TABS       Prenatal Vit-DSS-Fe Cbn-FA (VIRT-CHAN GT) 90-1 MG

## 2023-06-26 RX ORDER — DICLOFENAC SODIUM 75 MG/1
TABLET, DELAYED RELEASE ORAL
Qty: 90 TABLET | Refills: 0 | OUTPATIENT
Start: 2023-06-26

## 2023-06-26 RX ORDER — GABAPENTIN 600 MG/1
TABLET ORAL
Qty: 360 TABLET | Refills: 0 | OUTPATIENT
Start: 2023-06-26

## 2023-06-27 ENCOUNTER — TELEPHONE (OUTPATIENT)
Dept: PAIN MANAGEMENT | Age: 68
End: 2023-06-27

## 2023-06-27 DIAGNOSIS — M79.7 FIBROMYALGIA: ICD-10-CM

## 2023-06-27 DIAGNOSIS — M51.37 DEGENERATION OF LUMBAR OR LUMBOSACRAL INTERVERTEBRAL DISC: ICD-10-CM

## 2023-06-27 DIAGNOSIS — M17.0 PRIMARY OSTEOARTHRITIS OF BOTH KNEES: ICD-10-CM

## 2023-06-27 DIAGNOSIS — M19.90 ARTHRITIS: ICD-10-CM

## 2023-06-27 DIAGNOSIS — G89.4 CHRONIC PAIN SYNDROME: ICD-10-CM

## 2023-06-27 RX ORDER — OXYCODONE HYDROCHLORIDE 5 MG/1
5 TABLET ORAL 3 TIMES DAILY PRN
Qty: 84 TABLET | Refills: 0 | Status: SHIPPED | OUTPATIENT
Start: 2023-06-27 | End: 2023-07-17 | Stop reason: SDUPTHER

## 2023-06-27 NOTE — TELEPHONE ENCOUNTER
Patient called and stated that her Oxycodone HCL 5mg is out of stock and doesn't know when itll be back in stock.      [facility] [phone] Madeline Giordano [phone]    Patient is requesting a callback

## 2023-06-27 NOTE — TELEPHONE ENCOUNTER
Called pharmacy to cancel Roxicodone, new script will be sent to the preferred pharmacy requested. Please be advised when patient calls back please inform that her medication has been sent to her preferred pharmacy requested. Please be advised.

## 2023-07-17 ENCOUNTER — OFFICE VISIT (OUTPATIENT)
Dept: PAIN MANAGEMENT | Age: 68
End: 2023-07-17
Payer: MEDICARE

## 2023-07-17 VITALS
WEIGHT: 214 LBS | SYSTOLIC BLOOD PRESSURE: 112 MMHG | DIASTOLIC BLOOD PRESSURE: 67 MMHG | BODY MASS INDEX: 39.14 KG/M2 | HEART RATE: 69 BPM

## 2023-07-17 DIAGNOSIS — M19.90 ARTHRITIS: ICD-10-CM

## 2023-07-17 DIAGNOSIS — M79.7 FIBROMYALGIA: ICD-10-CM

## 2023-07-17 DIAGNOSIS — M51.37 DEGENERATION OF LUMBAR OR LUMBOSACRAL INTERVERTEBRAL DISC: ICD-10-CM

## 2023-07-17 DIAGNOSIS — M17.0 PRIMARY OSTEOARTHRITIS OF BOTH KNEES: ICD-10-CM

## 2023-07-17 DIAGNOSIS — G89.4 CHRONIC PAIN SYNDROME: ICD-10-CM

## 2023-07-17 PROCEDURE — 99213 OFFICE O/P EST LOW 20 MIN: CPT | Performed by: INTERNAL MEDICINE

## 2023-07-17 PROCEDURE — 1123F ACP DISCUSS/DSCN MKR DOCD: CPT | Performed by: INTERNAL MEDICINE

## 2023-07-17 RX ORDER — MORPHINE SULFATE 30 MG/1
30 TABLET, FILM COATED, EXTENDED RELEASE ORAL DAILY
Qty: 28 TABLET | Refills: 0 | Status: SHIPPED | OUTPATIENT
Start: 2023-07-17 | End: 2023-08-14

## 2023-07-17 RX ORDER — TOPIRAMATE 25 MG/1
25 TABLET ORAL 2 TIMES DAILY
Qty: 60 TABLET | Refills: 1 | Status: SHIPPED | OUTPATIENT
Start: 2023-07-17

## 2023-07-17 RX ORDER — OXYCODONE HYDROCHLORIDE 5 MG/1
5 TABLET ORAL 3 TIMES DAILY PRN
Qty: 84 TABLET | Refills: 0 | Status: SHIPPED | OUTPATIENT
Start: 2023-07-17 | End: 2023-08-14

## 2023-07-17 NOTE — PROGRESS NOTES
Catrachita Cowart  1955  [id number]      HISTORY OF PRESENT ILLNESS:  Ms. Rudy Flower is a 77 y.o. female returns for a follow up visit for pain management  She has a diagnosis of   1. Chronic pain syndrome    2. Arthritis    3. Primary osteoarthritis of both knees    4. Fibromyalgia    5. Mood disorder ([address])    6. Chronic migraine without aura without status migrainosus, not intractable    7. Degeneration of lumbar or lumbosacral intervertebral disc    8. Primary insomnia    . She complains of pain in the  bilateral shoulders, bilateral elbows, lower back, bilateral knees, bilateral ankles and feet  She rates the pain 6/10 and describes it as sharp, aching, burning. Current treatment regimen has helped relieve about 60% of the pain. She denies any side effects from the current pain regimen. Patient reports that since the last follow up visit the physical functioning is unchanged, family/social relationships are unchanged, mood is unchanged sleep patterns are unchanged, and that the overall functioning is unchanged. Patient denies misusing/abusing her narcotic pain medications or using any illegal drugs. There are No indicators for possible drug abuse, addiction or diversion problems. Patient reports she has been doing fair, having occasional flare up of back pains. She says she is managing with the medications. She denies any side effects with the medications. She mentions she is using all the adjuvants. She reports her weight has been stable. ALLERGIES: Patients list of allergies were reviewed     MEDICATIONS: Ms. Rudy Flower list of medications were reviewed. Her current medications are   Outpatient Medications Prior to Visit   Medication Sig Dispense Refill    oxyCODONE (ROXICODONE) 5 MG immediate release tablet Take 1 tablet by mouth 3 times daily as needed for Pain (max 3 per day) for up to 28 days.  84 tablet 0    morphine (MS CONTIN) 30 MG extended release tablet Take 1 tablet by mouth daily

## 2023-08-14 ENCOUNTER — OFFICE VISIT (OUTPATIENT)
Dept: PAIN MANAGEMENT | Age: 68
End: 2023-08-14
Payer: MEDICARE

## 2023-08-14 VITALS
WEIGHT: 215 LBS | SYSTOLIC BLOOD PRESSURE: 121 MMHG | DIASTOLIC BLOOD PRESSURE: 87 MMHG | BODY MASS INDEX: 39.32 KG/M2 | HEART RATE: 73 BPM

## 2023-08-14 DIAGNOSIS — M51.37 DEGENERATION OF LUMBAR OR LUMBOSACRAL INTERVERTEBRAL DISC: ICD-10-CM

## 2023-08-14 DIAGNOSIS — M19.90 ARTHRITIS: ICD-10-CM

## 2023-08-14 DIAGNOSIS — M79.7 FIBROMYALGIA: ICD-10-CM

## 2023-08-14 DIAGNOSIS — G89.4 CHRONIC PAIN SYNDROME: ICD-10-CM

## 2023-08-14 DIAGNOSIS — M17.0 PRIMARY OSTEOARTHRITIS OF BOTH KNEES: ICD-10-CM

## 2023-08-14 PROCEDURE — 99213 OFFICE O/P EST LOW 20 MIN: CPT | Performed by: INTERNAL MEDICINE

## 2023-08-14 PROCEDURE — 1123F ACP DISCUSS/DSCN MKR DOCD: CPT | Performed by: INTERNAL MEDICINE

## 2023-08-14 RX ORDER — DICLOFENAC SODIUM 75 MG/1
75 TABLET, DELAYED RELEASE ORAL DAILY
Qty: 90 TABLET | Refills: 0 | Status: SHIPPED | OUTPATIENT
Start: 2023-08-14

## 2023-08-14 RX ORDER — GABAPENTIN 600 MG/1
TABLET ORAL
Qty: 360 TABLET | Refills: 0 | Status: SHIPPED | OUTPATIENT
Start: 2023-08-14 | End: 2023-12-04

## 2023-08-14 RX ORDER — TOPIRAMATE 25 MG/1
25 TABLET ORAL 2 TIMES DAILY
Qty: 60 TABLET | Refills: 1 | Status: SHIPPED | OUTPATIENT
Start: 2023-08-14

## 2023-08-14 RX ORDER — MORPHINE SULFATE 30 MG/1
30 TABLET, FILM COATED, EXTENDED RELEASE ORAL DAILY
Qty: 28 TABLET | Refills: 0 | Status: SHIPPED | OUTPATIENT
Start: 2023-08-14 | End: 2023-09-11

## 2023-08-14 RX ORDER — AMITRIPTYLINE HYDROCHLORIDE 25 MG/1
25-50 TABLET, FILM COATED ORAL NIGHTLY
Qty: 180 TABLET | Refills: 0 | Status: SHIPPED | OUTPATIENT
Start: 2023-08-14

## 2023-08-14 RX ORDER — OXYCODONE HYDROCHLORIDE 5 MG/1
5 TABLET ORAL 3 TIMES DAILY PRN
Qty: 84 TABLET | Refills: 0 | Status: SHIPPED | OUTPATIENT
Start: 2023-08-14 | End: 2023-09-11

## 2023-08-14 NOTE — PROGRESS NOTES
Basia Sterling  1955  [id number]      HISTORY OF PRESENT ILLNESS:  Ms. Gomez Monreal is a 77 y.o. female returns for a follow up visit for pain management  She has a diagnosis of   1. Chronic pain syndrome    2. Fibromyalgia    3. Chronic migraine without aura without status migrainosus, not intractable    4. Arthritis    5. Degeneration of lumbar or lumbosacral intervertebral disc    6. Mood disorder ([address])    7. Primary osteoarthritis of both knees    8. Primary insomnia    . She complains of pain in the  bilateral shoulders, upper back, right elbow, bilateral knees with radiation to the right arm, bilateral hands, bilateral legs  She rates the pain 6/10 and describes it as sharp, burning. Current treatment regimen has helped relieve about 40% of the pain. She denies any side effects from the current pain regimen. Patient reports that since the last follow up visit the physical functioning is unchanged, family/social relationships are unchanged, mood is unchanged sleep patterns are unchanged, and that the overall functioning is unchanged. Patient denies misusing/abusing her narcotic pain medications or using any illegal drugs. There are No indicators for possible drug abuse, addiction or diversion problems. Patient states she has been doing fair, she is managing with the medications. Ms. Gomez Monreal states everything hurts but helped with medications. Patient denies any constipation symptoms. She reports she is managing her ADL's and house chores. She states she had an episode of chest wall pain for 2 days, she states she is doing okay now. Ms. Gomez Monreal reports she is managing her ADL's and house chores and activities. Has increased tolerance  for sitting , standing and walking by almost 40%. Has increased household chores- laundry 1-2 x s a week , cooking and light cleaning. ALLERGIES: Patients list of allergies were reviewed     MEDICATIONS: Ms. Gomez Monreal list of medications were reviewed. Her

## 2023-09-11 ENCOUNTER — OFFICE VISIT (OUTPATIENT)
Dept: PAIN MANAGEMENT | Age: 68
End: 2023-09-11
Payer: MEDICARE

## 2023-09-11 VITALS
OXYGEN SATURATION: 99 % | SYSTOLIC BLOOD PRESSURE: 120 MMHG | BODY MASS INDEX: 39.69 KG/M2 | DIASTOLIC BLOOD PRESSURE: 82 MMHG | WEIGHT: 217 LBS | HEART RATE: 88 BPM

## 2023-09-11 DIAGNOSIS — F51.01 PRIMARY INSOMNIA: ICD-10-CM

## 2023-09-11 DIAGNOSIS — G43.709 CHRONIC MIGRAINE WITHOUT AURA WITHOUT STATUS MIGRAINOSUS, NOT INTRACTABLE: ICD-10-CM

## 2023-09-11 DIAGNOSIS — F39 MOOD DISORDER (HCC): ICD-10-CM

## 2023-09-11 DIAGNOSIS — M17.0 PRIMARY OSTEOARTHRITIS OF BOTH KNEES: ICD-10-CM

## 2023-09-11 DIAGNOSIS — G89.4 CHRONIC PAIN SYNDROME: ICD-10-CM

## 2023-09-11 DIAGNOSIS — M51.37 DEGENERATION OF LUMBAR OR LUMBOSACRAL INTERVERTEBRAL DISC: ICD-10-CM

## 2023-09-11 DIAGNOSIS — M79.7 FIBROMYALGIA: ICD-10-CM

## 2023-09-11 DIAGNOSIS — M19.90 ARTHRITIS: ICD-10-CM

## 2023-09-11 PROCEDURE — 99214 OFFICE O/P EST MOD 30 MIN: CPT | Performed by: INTERNAL MEDICINE

## 2023-09-11 PROCEDURE — 1123F ACP DISCUSS/DSCN MKR DOCD: CPT | Performed by: INTERNAL MEDICINE

## 2023-09-11 RX ORDER — OXYCODONE HYDROCHLORIDE 5 MG/1
5 TABLET ORAL 3 TIMES DAILY PRN
Qty: 84 TABLET | Refills: 0 | Status: SHIPPED | OUTPATIENT
Start: 2023-09-11 | End: 2023-10-09

## 2023-09-11 RX ORDER — NALOXONE HYDROCHLORIDE 4 MG/.1ML
1 SPRAY NASAL PRN
Qty: 1 EACH | Refills: 0 | Status: SHIPPED | OUTPATIENT
Start: 2023-09-11

## 2023-09-11 RX ORDER — TOPIRAMATE 25 MG/1
25 TABLET ORAL 2 TIMES DAILY
Qty: 60 TABLET | Refills: 1 | Status: SHIPPED | OUTPATIENT
Start: 2023-09-11

## 2023-09-11 RX ORDER — MORPHINE SULFATE 30 MG/1
30 TABLET, FILM COATED, EXTENDED RELEASE ORAL DAILY
Qty: 28 TABLET | Refills: 0 | Status: SHIPPED | OUTPATIENT
Start: 2023-09-11 | End: 2023-10-09

## 2023-10-09 ENCOUNTER — OFFICE VISIT (OUTPATIENT)
Dept: PAIN MANAGEMENT | Age: 68
End: 2023-10-09
Payer: MEDICARE

## 2023-10-09 VITALS
WEIGHT: 213 LBS | SYSTOLIC BLOOD PRESSURE: 123 MMHG | DIASTOLIC BLOOD PRESSURE: 72 MMHG | OXYGEN SATURATION: 93 % | HEART RATE: 78 BPM | BODY MASS INDEX: 38.96 KG/M2

## 2023-10-09 DIAGNOSIS — M51.37 DEGENERATION OF LUMBAR OR LUMBOSACRAL INTERVERTEBRAL DISC: ICD-10-CM

## 2023-10-09 DIAGNOSIS — M17.0 PRIMARY OSTEOARTHRITIS OF BOTH KNEES: ICD-10-CM

## 2023-10-09 DIAGNOSIS — G89.4 CHRONIC PAIN SYNDROME: ICD-10-CM

## 2023-10-09 DIAGNOSIS — M19.90 ARTHRITIS: ICD-10-CM

## 2023-10-09 DIAGNOSIS — M79.7 FIBROMYALGIA: ICD-10-CM

## 2023-10-09 PROCEDURE — 1123F ACP DISCUSS/DSCN MKR DOCD: CPT | Performed by: INTERNAL MEDICINE

## 2023-10-09 PROCEDURE — 99213 OFFICE O/P EST LOW 20 MIN: CPT | Performed by: INTERNAL MEDICINE

## 2023-10-09 RX ORDER — OXYCODONE HYDROCHLORIDE 5 MG/1
5 TABLET ORAL 3 TIMES DAILY PRN
Qty: 84 TABLET | Refills: 0 | Status: SHIPPED | OUTPATIENT
Start: 2023-10-09 | End: 2023-11-06

## 2023-10-09 RX ORDER — TOPIRAMATE 25 MG/1
25 TABLET ORAL 2 TIMES DAILY
Qty: 60 TABLET | Refills: 1 | Status: SHIPPED | OUTPATIENT
Start: 2023-10-09

## 2023-10-09 RX ORDER — MORPHINE SULFATE 30 MG/1
30 TABLET, FILM COATED, EXTENDED RELEASE ORAL DAILY
Qty: 28 TABLET | Refills: 0 | Status: SHIPPED | OUTPATIENT
Start: 2023-10-09 | End: 2023-11-06

## 2023-10-09 NOTE — PROGRESS NOTES
not taken as prescribed, were also discussed. If the patient develops new symptoms or if the symptoms worsen, the patient should call the office. While transcribing every attempt was made to maintain the accuracy of the note in terms of it's contents,there may have been some errors made inadvertently. Thank you for allowing me to participate in the care of this patient.     Abbey Sow MD.    Cc: Jeannette Hidalgo MD

## 2023-10-26 ENCOUNTER — HOSPITAL ENCOUNTER (EMERGENCY)
Age: 68
Discharge: ANOTHER HOSPITAL | End: 2023-10-26
Attending: EMERGENCY MEDICINE
Payer: MEDICARE

## 2023-10-26 ENCOUNTER — APPOINTMENT (OUTPATIENT)
Dept: CT IMAGING | Age: 68
End: 2023-10-26
Payer: MEDICARE

## 2023-10-26 ENCOUNTER — APPOINTMENT (OUTPATIENT)
Dept: GENERAL RADIOLOGY | Age: 68
End: 2023-10-26
Payer: MEDICARE

## 2023-10-26 VITALS
TEMPERATURE: 98 F | HEART RATE: 70 BPM | SYSTOLIC BLOOD PRESSURE: 128 MMHG | OXYGEN SATURATION: 98 % | WEIGHT: 212.96 LBS | RESPIRATION RATE: 18 BRPM | DIASTOLIC BLOOD PRESSURE: 80 MMHG | HEIGHT: 62 IN | BODY MASS INDEX: 39.19 KG/M2

## 2023-10-26 DIAGNOSIS — R41.82 ALTERED MENTAL STATUS, UNSPECIFIED ALTERED MENTAL STATUS TYPE: Primary | ICD-10-CM

## 2023-10-26 DIAGNOSIS — U07.1 COVID: ICD-10-CM

## 2023-10-26 DIAGNOSIS — R53.1 GENERALIZED WEAKNESS: ICD-10-CM

## 2023-10-26 LAB
ALBUMIN SERPL-MCNC: 4.2 G/DL (ref 3.4–5)
ALBUMIN/GLOB SERPL: 1.7 {RATIO} (ref 1.1–2.2)
ALP SERPL-CCNC: 103 U/L (ref 40–129)
ALT SERPL-CCNC: 21 U/L (ref 10–40)
AMPHETAMINES UR QL SCN>1000 NG/ML: ABNORMAL
ANION GAP SERPL CALCULATED.3IONS-SCNC: 9 MMOL/L (ref 3–16)
AST SERPL-CCNC: 28 U/L (ref 15–37)
BARBITURATES UR QL SCN>200 NG/ML: ABNORMAL
BASOPHILS # BLD: 0 K/UL (ref 0–0.2)
BASOPHILS NFR BLD: 0.7 %
BENZODIAZ UR QL SCN>200 NG/ML: ABNORMAL
BILIRUB SERPL-MCNC: 0.3 MG/DL (ref 0–1)
BILIRUB UR QL STRIP.AUTO: NEGATIVE
BUN SERPL-MCNC: 11 MG/DL (ref 7–20)
CALCIUM SERPL-MCNC: 9 MG/DL (ref 8.3–10.6)
CANNABINOIDS UR QL SCN>50 NG/ML: ABNORMAL
CHLORIDE SERPL-SCNC: 102 MMOL/L (ref 99–110)
CLARITY UR: CLEAR
CO2 SERPL-SCNC: 30 MMOL/L (ref 21–32)
COCAINE UR QL SCN: ABNORMAL
COLOR UR: YELLOW
CREAT SERPL-MCNC: 1 MG/DL (ref 0.6–1.2)
DEPRECATED RDW RBC AUTO: 13.3 % (ref 12.4–15.4)
DRUG SCREEN COMMENT UR-IMP: ABNORMAL
EKG ATRIAL RATE: 71 BPM
EKG DIAGNOSIS: NORMAL
EKG P AXIS: 37 DEGREES
EKG P-R INTERVAL: 130 MS
EKG Q-T INTERVAL: 430 MS
EKG QRS DURATION: 98 MS
EKG QTC CALCULATION (BAZETT): 467 MS
EKG R AXIS: -8 DEGREES
EKG T AXIS: 9 DEGREES
EKG VENTRICULAR RATE: 71 BPM
EOSINOPHIL # BLD: 0.1 K/UL (ref 0–0.6)
EOSINOPHIL NFR BLD: 2.6 %
FENTANYL SCREEN, URINE: ABNORMAL
FLUAV RNA UPPER RESP QL NAA+PROBE: NEGATIVE
FLUBV AG NPH QL: NEGATIVE
GFR SERPLBLD CREATININE-BSD FMLA CKD-EPI: >60 ML/MIN/{1.73_M2}
GLUCOSE BLD-MCNC: 107 MG/DL (ref 70–99)
GLUCOSE SERPL-MCNC: 113 MG/DL (ref 70–99)
GLUCOSE UR STRIP.AUTO-MCNC: NEGATIVE MG/DL
HCT VFR BLD AUTO: 37.9 % (ref 36–48)
HGB BLD-MCNC: 12.6 G/DL (ref 12–16)
HGB UR QL STRIP.AUTO: NEGATIVE
KETONES UR STRIP.AUTO-MCNC: NEGATIVE MG/DL
LACTATE BLDV-SCNC: 1.5 MMOL/L (ref 0.4–1.9)
LEUKOCYTE ESTERASE UR QL STRIP.AUTO: NEGATIVE
LYMPHOCYTES # BLD: 2.2 K/UL (ref 1–5.1)
LYMPHOCYTES NFR BLD: 49.6 %
MCH RBC QN AUTO: 31 PG (ref 26–34)
MCHC RBC AUTO-ENTMCNC: 33.3 G/DL (ref 31–36)
MCV RBC AUTO: 93.1 FL (ref 80–100)
METHADONE UR QL SCN>300 NG/ML: ABNORMAL
MONOCYTES # BLD: 0.4 K/UL (ref 0–1.3)
MONOCYTES NFR BLD: 9 %
NEUTROPHILS # BLD: 1.7 K/UL (ref 1.7–7.7)
NEUTROPHILS NFR BLD: 38.1 %
NITRITE UR QL STRIP.AUTO: NEGATIVE
OPIATES UR QL SCN>300 NG/ML: POSITIVE
OXYCODONE UR QL SCN: POSITIVE
PCP UR QL SCN>25 NG/ML: ABNORMAL
PERFORMED ON: ABNORMAL
PH UR STRIP.AUTO: 6.5 [PH] (ref 5–8)
PH UR STRIP: 6.5 [PH]
PLATELET # BLD AUTO: 215 K/UL (ref 135–450)
PMV BLD AUTO: 7.3 FL (ref 5–10.5)
POTASSIUM SERPL-SCNC: 4 MMOL/L (ref 3.5–5.1)
PROT SERPL-MCNC: 6.7 G/DL (ref 6.4–8.2)
PROT UR STRIP.AUTO-MCNC: NEGATIVE MG/DL
RBC # BLD AUTO: 4.07 M/UL (ref 4–5.2)
SARS-COV-2 RDRP RESP QL NAA+PROBE: DETECTED
SODIUM SERPL-SCNC: 141 MMOL/L (ref 136–145)
SP GR UR STRIP.AUTO: 1.01 (ref 1–1.03)
TROPONIN, HIGH SENSITIVITY: 7 NG/L (ref 0–14)
UA COMPLETE W REFLEX CULTURE PNL UR: NORMAL
UA DIPSTICK W REFLEX MICRO PNL UR: NORMAL
URN SPEC COLLECT METH UR: NORMAL
UROBILINOGEN UR STRIP-ACNC: 0.2 E.U./DL
WBC # BLD AUTO: 4.4 K/UL (ref 4–11)

## 2023-10-26 PROCEDURE — 84484 ASSAY OF TROPONIN QUANT: CPT

## 2023-10-26 PROCEDURE — 36415 COLL VENOUS BLD VENIPUNCTURE: CPT

## 2023-10-26 PROCEDURE — 99285 EMERGENCY DEPT VISIT HI MDM: CPT

## 2023-10-26 PROCEDURE — 83605 ASSAY OF LACTIC ACID: CPT

## 2023-10-26 PROCEDURE — 80053 COMPREHEN METABOLIC PANEL: CPT

## 2023-10-26 PROCEDURE — 6360000004 HC RX CONTRAST MEDICATION: Performed by: EMERGENCY MEDICINE

## 2023-10-26 PROCEDURE — 87635 SARS-COV-2 COVID-19 AMP PRB: CPT

## 2023-10-26 PROCEDURE — 71045 X-RAY EXAM CHEST 1 VIEW: CPT

## 2023-10-26 PROCEDURE — 70496 CT ANGIOGRAPHY HEAD: CPT

## 2023-10-26 PROCEDURE — 70450 CT HEAD/BRAIN W/O DYE: CPT

## 2023-10-26 PROCEDURE — 93005 ELECTROCARDIOGRAM TRACING: CPT | Performed by: EMERGENCY MEDICINE

## 2023-10-26 PROCEDURE — 87804 INFLUENZA ASSAY W/OPTIC: CPT

## 2023-10-26 PROCEDURE — 80307 DRUG TEST PRSMV CHEM ANLYZR: CPT

## 2023-10-26 PROCEDURE — 2580000003 HC RX 258: Performed by: EMERGENCY MEDICINE

## 2023-10-26 PROCEDURE — 85025 COMPLETE CBC W/AUTO DIFF WBC: CPT

## 2023-10-26 PROCEDURE — 81003 URINALYSIS AUTO W/O SCOPE: CPT

## 2023-10-26 PROCEDURE — 93010 ELECTROCARDIOGRAM REPORT: CPT | Performed by: INTERNAL MEDICINE

## 2023-10-26 RX ORDER — 0.9 % SODIUM CHLORIDE 0.9 %
500 INTRAVENOUS SOLUTION INTRAVENOUS ONCE
Status: COMPLETED | OUTPATIENT
Start: 2023-10-26 | End: 2023-10-26

## 2023-10-26 RX ADMIN — SODIUM CHLORIDE 500 ML: 9 INJECTION, SOLUTION INTRAVENOUS at 18:43

## 2023-10-26 RX ADMIN — IOMEPROL INJECTION 75 ML: 714 INJECTION, SOLUTION INTRAVASCULAR at 17:53

## 2023-10-26 ASSESSMENT — PAIN - FUNCTIONAL ASSESSMENT
PAIN_FUNCTIONAL_ASSESSMENT: NONE - DENIES PAIN

## 2023-10-26 ASSESSMENT — LIFESTYLE VARIABLES
HOW MANY STANDARD DRINKS CONTAINING ALCOHOL DO YOU HAVE ON A TYPICAL DAY: PATIENT DOES NOT DRINK
HOW OFTEN DO YOU HAVE A DRINK CONTAINING ALCOHOL: NEVER

## 2023-10-26 NOTE — ED NOTES
Dr Emil Moon states pt is is not a CVA or TIA and does not need any further NIHSS axpablo Ruiz RN  10/26/23 1930

## 2023-10-26 NOTE — ED NOTES
[facility] Emergency Room Stroke Team returned phone call     Mellisa Madden RN  10/26/23 6074

## 2023-10-26 NOTE — ED NOTES
Pt returned to tx rm from CT scan.  Remains without status changes or s/s distress     Ale Goncalves RN  10/26/23 4190

## 2023-10-26 NOTE — ED PROVIDER NOTES
CC:   Chief Complaint   Patient presents with    Altered Mental Status     Family informed EMS that pt with worsening lethargy & confusion since yesterday afternoon        HPI:  Alphonse Tang \"Mylene" is a 77 y.o. female with a history of chronic pain syndrome, depression, GERD, abdominal hernia, insomnia who presents to the emergency department with complaints of generalized weakness, lethargy, confusion. Patient's  noted that yesterday mid day the patient began having symptoms of severe generalized weakness and had difficulty getting out of bed. She also had difficulty communicating her speech was slow, she seemed confused. Her gait has been unsteady. She feels weak, somewhat achy, minimal cough. He reports that she had a similar presentation several years ago after receiving a COVID vaccination and was admitted for 4 to 5 days with a thorough work-up which was unremarkable. During the initial evaluation and work-up it was not known that the patient had a active diagnosis of COVID.  did not convey this information. However when the daughters came they reported that the patient is on day 3 of Paxlovid. They stated they informed EMS. History obtained via the patient, her daughters, her     External records reviewed    ROS:  All Pertinent ROS Negative Unless otherwise stated within HPI. VITALS:  Vitals:    10/26/23 1840   BP: 124/78   Pulse: 70   Resp: 18   Temp:    SpO2: 98%        PHYSICAL EXAM:      Vital signs reviewed  General: Weak, frail, ill but nontoxic-appearing  Vitals:  Vital signs reviewed, see nurses notes  Neck:  No JVD, or lymphadenopathy. Cardiovascular:  Regular rhythm, Normal sounds and absence of murmurs, rubs or gallops. Lungs:  Clear to auscultation without rales, ronchi, or wheezing. Abdomen:  Soft, unremarkable and without evidence of organomegally, masses, or abdominal aortic enlargement, No guarding.   Extremities:  Non-edematous and Normal distal

## 2023-10-26 NOTE — PLAN OF CARE
Brief Stroke Team Plan of Care    I did not see the patient but I discussed the case with the emergency medicine team.    In brief, this is a 77year old woman who presents with >24 hours of subtle word-finding difficulties and confusion. A/P: Not a TNK candidate based upon time. CT, CTA within normal limits.  -No acute treatment per stroke team. Rest of workup per ED.

## 2023-10-26 NOTE — ED NOTES
Cherie Kirkpatrick from the transfer center is paging the hospitalist at [facility].       Tao Harrell  10/26/23 1909

## 2023-10-27 NOTE — ED NOTES
Pt oxygen dropped to 88% on room air. Upon entering room oxygen went back up to 93%. Pt placed on 2 liters of oxygen. Notified pt and family that we are still waiting on bed from [address].      Carolina Levine RN  10/26/23 2038

## 2023-11-06 ENCOUNTER — OFFICE VISIT (OUTPATIENT)
Dept: PAIN MANAGEMENT | Age: 68
End: 2023-11-06
Payer: MEDICARE

## 2023-11-06 VITALS
DIASTOLIC BLOOD PRESSURE: 72 MMHG | OXYGEN SATURATION: 72 % | SYSTOLIC BLOOD PRESSURE: 115 MMHG | WEIGHT: 217 LBS | HEART RATE: 90 BPM | BODY MASS INDEX: 39.68 KG/M2

## 2023-11-06 DIAGNOSIS — G89.4 CHRONIC PAIN SYNDROME: ICD-10-CM

## 2023-11-06 DIAGNOSIS — M51.37 DEGENERATION OF LUMBAR OR LUMBOSACRAL INTERVERTEBRAL DISC: ICD-10-CM

## 2023-11-06 DIAGNOSIS — M19.90 ARTHRITIS: ICD-10-CM

## 2023-11-06 DIAGNOSIS — M17.0 PRIMARY OSTEOARTHRITIS OF BOTH KNEES: ICD-10-CM

## 2023-11-06 DIAGNOSIS — M79.7 FIBROMYALGIA: ICD-10-CM

## 2023-11-06 PROCEDURE — 1123F ACP DISCUSS/DSCN MKR DOCD: CPT | Performed by: INTERNAL MEDICINE

## 2023-11-06 PROCEDURE — 99213 OFFICE O/P EST LOW 20 MIN: CPT | Performed by: INTERNAL MEDICINE

## 2023-11-06 RX ORDER — TOPIRAMATE 25 MG/1
25 TABLET ORAL 2 TIMES DAILY
Qty: 60 TABLET | Refills: 1 | Status: SHIPPED | OUTPATIENT
Start: 2023-11-06

## 2023-11-06 RX ORDER — OXYCODONE HYDROCHLORIDE 5 MG/1
5 TABLET ORAL 3 TIMES DAILY PRN
Qty: 105 TABLET | Refills: 0 | Status: SHIPPED | OUTPATIENT
Start: 2023-11-06 | End: 2023-12-11

## 2023-11-06 RX ORDER — DICLOFENAC SODIUM 75 MG/1
75 TABLET, DELAYED RELEASE ORAL DAILY
Qty: 90 TABLET | Refills: 0 | Status: SHIPPED | OUTPATIENT
Start: 2023-11-06

## 2023-11-06 RX ORDER — GABAPENTIN 600 MG/1
TABLET ORAL
Qty: 360 TABLET | Refills: 0 | Status: SHIPPED | OUTPATIENT
Start: 2023-11-06 | End: 2024-02-26

## 2023-11-06 RX ORDER — AMITRIPTYLINE HYDROCHLORIDE 25 MG/1
25-50 TABLET, FILM COATED ORAL NIGHTLY
Qty: 180 TABLET | Refills: 0 | Status: SHIPPED | OUTPATIENT
Start: 2023-11-06

## 2023-11-06 RX ORDER — MORPHINE SULFATE 30 MG/1
30 TABLET, FILM COATED, EXTENDED RELEASE ORAL DAILY
Qty: 35 TABLET | Refills: 0 | Status: SHIPPED | OUTPATIENT
Start: 2023-11-06 | End: 2023-12-11

## 2023-11-09 NOTE — PROGRESS NOTES
Hannah Summers  1955  [id number]      HISTORY OF PRESENT ILLNESS:  Ms. Jessee Calzada is a 77 y.o. female returns for a follow up visit for pain management  She has a diagnosis of   1. Chronic pain syndrome    2. Degeneration of lumbar or lumbosacral intervertebral disc    3. Mood disorder ([address])    4. Fibromyalgia    5. Primary osteoarthritis of both knees    6. Primary insomnia    7. Arthritis    8. Chronic migraine without aura without status migrainosus, not intractable    . As per information/history obtained from the PADT(patient assessment and documentation tool) -  She complains of pain in the neck and lower back with radiation to the shoulders Bilateral, elbows Bilateral, knees Bilateral, ankles Bilateral, and feet Bilateral She rates the pain 5/10 and describes it as sharp, aching, burning, numbness, pins and needles. Pain is made worse by: movement, walking, standing, sitting, bending. She denies any side effects from the current pain regimen. Patient reports that since last follow up visit the physical functioning is unchanged, family/social relationships are unchanged, mood is unchanged sleep patterns are unchanged. Ms. Jessee Calzada states that since starting the treatment with the current regimen the  overall functioning  in the above aspects is  better, Patient denies misusing/abusing her narcotic pain medications or using any illegal drugs. There are No indicators for possible drug abuse, addiction or diversion problems. Upon obtaining the medical history from Ms. Jessee Calzada regarding today's office visit for her presenting problems,Patient states she was sick in the hospital for a couple of days. She mentions she had Covid/Pneumonia, but is doing better now. She says she is using Ms. Contin with Oxycodone for btp. She reports she is using all the other adjuvants. ALLERGIES: Patients list of allergies were reviewed     MEDICATIONS: Ms. Jessee Calzada list of medications were reviewed. Her

## 2023-11-10 ENCOUNTER — TELEPHONE (OUTPATIENT)
Dept: PAIN MANAGEMENT | Age: 68
End: 2023-11-10

## 2023-11-10 NOTE — TELEPHONE ENCOUNTER
Submitted PA for Morphine [facility] Key: [id number] STATUS: \"Available without authorization.\"

## 2023-11-13 ENCOUNTER — TELEPHONE (OUTPATIENT)
Dept: PAIN MANAGEMENT | Age: 68
End: 2023-11-13

## 2023-11-13 NOTE — TELEPHONE ENCOUNTER
Patient called saying that [facility] wrote her oxycodone script for 35 days. And the insurance will only cover 30 day supply. Pt said that the pharmacy is needing a script to refill her oxycodone and morphine.       Please advice

## 2023-11-14 NOTE — TELEPHONE ENCOUNTER
Pt is calling about oxycodone again wants a 30 day supply instead of 35 due to insurance. She says she needs the new script sent urgently. Please advise pt.

## 2023-11-14 NOTE — TELEPHONE ENCOUNTER
Per Medication list pharmacy received medications 11/06/23    Per [facility] when is next appt    Next apt is 12/14/23    Called patient inform her she can  her medication.

## 2023-11-15 NOTE — TELEPHONE ENCOUNTER
Called patient spoke to the  informing him that his wife has to tell the pharmacist to fill the oxycodone for 90 tablets for 30 days that will be enough until her next appointment.

## 2023-11-15 NOTE — TELEPHONE ENCOUNTER
Pt is calling about oxycodone again wants a 30 day supply instead of 35 due to insurance. She says she needs the new script sent urgently. The pharmacy does have a script but they need it for 30 days not 35 please let [facility] know and please advise pt.

## 2024-01-11 ENCOUNTER — OFFICE VISIT (OUTPATIENT)
Dept: PAIN MANAGEMENT | Age: 69
End: 2024-01-11
Payer: MEDICARE

## 2024-01-11 VITALS
WEIGHT: 217 LBS | OXYGEN SATURATION: 100 % | DIASTOLIC BLOOD PRESSURE: 77 MMHG | BODY MASS INDEX: 39.68 KG/M2 | HEART RATE: 68 BPM | SYSTOLIC BLOOD PRESSURE: 100 MMHG

## 2024-01-11 DIAGNOSIS — F51.01 PRIMARY INSOMNIA: ICD-10-CM

## 2024-01-11 DIAGNOSIS — G89.4 CHRONIC PAIN SYNDROME: ICD-10-CM

## 2024-01-11 DIAGNOSIS — Z51.81 ENCOUNTER FOR THERAPEUTIC DRUG MONITORING: ICD-10-CM

## 2024-01-11 DIAGNOSIS — G43.709 CHRONIC MIGRAINE WITHOUT AURA WITHOUT STATUS MIGRAINOSUS, NOT INTRACTABLE: ICD-10-CM

## 2024-01-11 DIAGNOSIS — M51.37 DEGENERATION OF LUMBAR OR LUMBOSACRAL INTERVERTEBRAL DISC: ICD-10-CM

## 2024-01-11 DIAGNOSIS — M19.90 ARTHRITIS: ICD-10-CM

## 2024-01-11 DIAGNOSIS — M17.0 PRIMARY OSTEOARTHRITIS OF BOTH KNEES: ICD-10-CM

## 2024-01-11 DIAGNOSIS — F39 MOOD DISORDER (HCC): ICD-10-CM

## 2024-01-11 DIAGNOSIS — M79.7 FIBROMYALGIA: ICD-10-CM

## 2024-01-11 PROCEDURE — 99214 OFFICE O/P EST MOD 30 MIN: CPT | Performed by: INTERNAL MEDICINE

## 2024-01-11 PROCEDURE — 1123F ACP DISCUSS/DSCN MKR DOCD: CPT | Performed by: INTERNAL MEDICINE

## 2024-01-11 RX ORDER — AMITRIPTYLINE HYDROCHLORIDE 25 MG/1
25-50 TABLET, FILM COATED ORAL NIGHTLY
Qty: 180 TABLET | Refills: 0 | Status: SHIPPED | OUTPATIENT
Start: 2024-01-11

## 2024-01-11 RX ORDER — TOPIRAMATE 25 MG/1
25 TABLET ORAL 2 TIMES DAILY
Qty: 60 TABLET | Refills: 1 | Status: SHIPPED | OUTPATIENT
Start: 2024-01-11

## 2024-01-11 RX ORDER — OXYCODONE HYDROCHLORIDE 5 MG/1
5 TABLET ORAL 3 TIMES DAILY PRN
Qty: 84 TABLET | Refills: 0 | Status: SHIPPED | OUTPATIENT
Start: 2024-01-11 | End: 2024-02-08

## 2024-01-11 RX ORDER — GABAPENTIN 600 MG/1
TABLET ORAL
Qty: 360 TABLET | Refills: 0 | Status: SHIPPED | OUTPATIENT
Start: 2024-01-11 | End: 2024-05-02

## 2024-01-11 RX ORDER — MORPHINE SULFATE 30 MG/1
30 TABLET, FILM COATED, EXTENDED RELEASE ORAL DAILY
Qty: 28 TABLET | Refills: 0 | Status: CANCELLED | OUTPATIENT
Start: 2024-01-11 | End: 2024-02-08

## 2024-01-11 RX ORDER — DICLOFENAC SODIUM 75 MG/1
75 TABLET, DELAYED RELEASE ORAL DAILY
Qty: 90 TABLET | Refills: 0 | Status: SHIPPED | OUTPATIENT
Start: 2024-01-11

## 2024-01-11 NOTE — PROGRESS NOTES
VIVELLE-DOT 0.05 MG/24HR APPLY 1 PATCH TO SKIN TWICE WEEKLY 8 patch 11    furosemide (LASIX) 20 MG tablet Take 1 tablet by mouth 3 times daily      therapeutic multivitamin-minerals (THERAGRAN-M) tablet Take 1 tablet by mouth daily       No current facility-administered medications for this visit.        Goals of current treatment regimen include improvement in pain, restoration of functioning- with focus on improvement in physical performance, general activity, work or disability,emotional distress, health care utilization and  decreased medication consumption.   Will continue to monitor progress towards achieving/maintaining therapeutic goals with special emphasis on  Improvement in perceived interfernce  of pain with ADL's. Ability to do home exercises independently. Ability to do household chores indoor and/or outdoor work and social and leisure activities.Improve psychosocial and physical functioning. - she is maintaining her treatment goal with the current regimen.    2.   Improving sleep to 6-7 hours a night. Restorative sleep either with assist device if recommended or with medications.  Improve mood/ anxiety and depression symptoms such as crying spells, low energy, problems with concentration, motivation.- she is maintaining her treatment goal with the current regimen.    3.   Reduction of reliance on opioid analgesia/more appropriate opioid use. Using the least effective dose to help with pain control and making a concerted effort to decrease the dose when possible.- she is maintaining her treatment goal with the current regimen.      Risks and benefits of the medications and other alternative treatments have been/were  discussed with the patient. Any questions on the  common side effects of these medications were also answered.  She was advised against drinking alcohol with the narcotic pain medicines, advised against driving or handling machinery when  starting or adjusting the dose of medicines,

## 2024-01-12 ENCOUNTER — TELEPHONE (OUTPATIENT)
Dept: PAIN MANAGEMENT | Age: 69
End: 2024-01-12

## 2024-01-12 RX ORDER — MORPHINE SULFATE 30 MG/1
30 TABLET, FILM COATED, EXTENDED RELEASE ORAL DAILY
Qty: 28 TABLET | Refills: 0 | Status: SHIPPED | OUTPATIENT
Start: 2024-01-12 | End: 2024-02-09

## 2024-01-12 NOTE — TELEPHONE ENCOUNTER
Pt is calling in regards to her morphine (MS CONTIN) 30 MG extended release tablet. It wasn't sent with the rest of her medication yesterday. She would like it sent to     [facility] [id number] - [place], [place] -   [address] - P [phone]     Please advise pt.

## 2024-01-12 NOTE — TELEPHONE ENCOUNTER
Called patient to inform her that her Morphine has been sent to her preferred pharmacy requested.

## 2024-02-08 ENCOUNTER — OFFICE VISIT (OUTPATIENT)
Dept: PAIN MANAGEMENT | Age: 69
End: 2024-02-08
Payer: MEDICARE

## 2024-02-08 VITALS
HEART RATE: 74 BPM | SYSTOLIC BLOOD PRESSURE: 132 MMHG | BODY MASS INDEX: 39.86 KG/M2 | DIASTOLIC BLOOD PRESSURE: 85 MMHG | WEIGHT: 218 LBS

## 2024-02-08 DIAGNOSIS — M19.90 ARTHRITIS: ICD-10-CM

## 2024-02-08 DIAGNOSIS — M79.7 FIBROMYALGIA: ICD-10-CM

## 2024-02-08 DIAGNOSIS — M51.37 DEGENERATION OF LUMBAR OR LUMBOSACRAL INTERVERTEBRAL DISC: ICD-10-CM

## 2024-02-08 DIAGNOSIS — M17.0 PRIMARY OSTEOARTHRITIS OF BOTH KNEES: ICD-10-CM

## 2024-02-08 DIAGNOSIS — G89.4 CHRONIC PAIN SYNDROME: ICD-10-CM

## 2024-02-08 PROCEDURE — 1123F ACP DISCUSS/DSCN MKR DOCD: CPT | Performed by: INTERNAL MEDICINE

## 2024-02-08 PROCEDURE — 99213 OFFICE O/P EST LOW 20 MIN: CPT | Performed by: INTERNAL MEDICINE

## 2024-02-09 RX ORDER — DICLOFENAC SODIUM 75 MG/1
75 TABLET, DELAYED RELEASE ORAL DAILY
Qty: 90 TABLET | Refills: 0 | Status: SHIPPED | OUTPATIENT
Start: 2024-02-09

## 2024-02-09 RX ORDER — TOPIRAMATE 25 MG/1
25 TABLET ORAL 2 TIMES DAILY
Qty: 60 TABLET | Refills: 1 | Status: SHIPPED | OUTPATIENT
Start: 2024-02-09

## 2024-02-09 RX ORDER — OXYCODONE HYDROCHLORIDE 5 MG/1
5 TABLET ORAL 3 TIMES DAILY PRN
Qty: 84 TABLET | Refills: 0 | Status: SHIPPED | OUTPATIENT
Start: 2024-02-09 | End: 2024-03-08

## 2024-02-09 RX ORDER — AMITRIPTYLINE HYDROCHLORIDE 25 MG/1
25-50 TABLET, FILM COATED ORAL NIGHTLY
Qty: 180 TABLET | Refills: 0 | Status: SHIPPED | OUTPATIENT
Start: 2024-02-09

## 2024-02-09 RX ORDER — MORPHINE SULFATE 30 MG/1
30 TABLET, FILM COATED, EXTENDED RELEASE ORAL DAILY
Qty: 28 TABLET | Refills: 0 | Status: SHIPPED | OUTPATIENT
Start: 2024-02-09 | End: 2024-03-08

## 2024-02-09 RX ORDER — GABAPENTIN 600 MG/1
TABLET ORAL
Qty: 360 TABLET | Refills: 0 | Status: SHIPPED | OUTPATIENT
Start: 2024-02-09 | End: 2024-05-30

## 2024-02-09 NOTE — PROGRESS NOTES
diclofenac (VOLTAREN) 75 MG EC tablet Take 1 tablet by mouth daily 90 tablet 0    gabapentin (NEURONTIN) 600 MG tablet Take 1 tablet po in the morning, take 1 tablet po in afternoon, take 2 tablets po in the evening 360 tablet 0    morphine (MS CONTIN) 30 MG extended release tablet Take 1 tablet by mouth daily for 28 days. 28 tablet 0    oxyCODONE (ROXICODONE) 5 MG immediate release tablet Take 1 tablet by mouth 3 times daily as needed for Pain (max 3 per day) for up to 28 days. 84 tablet 0    topiramate (TOPAMAX) 25 MG tablet Take 1 tablet by mouth 2 times daily 60 tablet 1    nirmatrelvir/ritonavir 300/100 (PAXLOVID) 20 x 150 MG & 10 x 100MG TBPK Take 3 tablets by mouth in the morning and 3 tablets in the evening. On day 3 for COVID.      naloxone (NARCAN) 4 MG/0.1ML LIQD nasal spray 1 spray by Nasal route as needed for Opioid Reversal 1 each 0    ketoconazole (NIZORAL) 2 % shampoo WASH SCALP THREE TIMES PER WEEK 120 mL 3    FLUoxetine (PROZAC) 40 MG capsule       Prenatal Vit-Fe Fumarate-FA (PNV PRENATAL PLUS MULTIVITAMIN) 27-1 MG TABS       Prenatal Vit-DSS-Fe Cbn-FA (VIRT-CHAN GT) 90-1 MG TABS       omeprazole (PRILOSEC) 40 MG capsule Take 1 capsule by mouth daily      KLOR-CON M20 20 MEQ tablet TAKE TWO TABLETS BY MOUTH TWICE A  tablet 3    VIVELLE-DOT 0.05 MG/24HR APPLY 1 PATCH TO SKIN TWICE WEEKLY 8 patch 11    furosemide (LASIX) 20 MG tablet Take 1 tablet by mouth 3 times daily      therapeutic multivitamin-minerals (THERAGRAN-M) tablet Take 1 tablet by mouth daily       No current facility-administered medications for this visit.       General Goals of current treatment regimen include improvement in pain, restoration of functioning- with focus on improvement in physical performance, general activity, work or disability,emotional distress, health care utilization and  decreased medication consumption.   Will continue to monitor progress towards achieving/maintaining therapeutic goals with special

## 2024-02-29 ENCOUNTER — OFFICE VISIT (OUTPATIENT)
Dept: PAIN MANAGEMENT | Age: 69
End: 2024-02-29
Payer: MEDICARE

## 2024-02-29 VITALS
WEIGHT: 217 LBS | SYSTOLIC BLOOD PRESSURE: 126 MMHG | BODY MASS INDEX: 39.68 KG/M2 | HEART RATE: 68 BPM | OXYGEN SATURATION: 95 % | DIASTOLIC BLOOD PRESSURE: 78 MMHG

## 2024-02-29 DIAGNOSIS — G89.4 CHRONIC PAIN SYNDROME: ICD-10-CM

## 2024-02-29 DIAGNOSIS — M17.0 PRIMARY OSTEOARTHRITIS OF BOTH KNEES: ICD-10-CM

## 2024-02-29 DIAGNOSIS — M51.37 DEGENERATION OF LUMBAR OR LUMBOSACRAL INTERVERTEBRAL DISC: ICD-10-CM

## 2024-02-29 DIAGNOSIS — M19.90 ARTHRITIS: ICD-10-CM

## 2024-02-29 DIAGNOSIS — M79.7 FIBROMYALGIA: ICD-10-CM

## 2024-02-29 PROCEDURE — 99213 OFFICE O/P EST LOW 20 MIN: CPT | Performed by: INTERNAL MEDICINE

## 2024-02-29 PROCEDURE — 1123F ACP DISCUSS/DSCN MKR DOCD: CPT | Performed by: INTERNAL MEDICINE

## 2024-02-29 RX ORDER — MORPHINE SULFATE 30 MG/1
30 TABLET, FILM COATED, EXTENDED RELEASE ORAL DAILY
Qty: 28 TABLET | Refills: 0 | Status: SHIPPED | OUTPATIENT
Start: 2024-02-29 | End: 2024-03-28

## 2024-02-29 RX ORDER — OXYCODONE HYDROCHLORIDE 5 MG/1
5 TABLET ORAL 3 TIMES DAILY PRN
Qty: 84 TABLET | Refills: 0 | Status: SHIPPED | OUTPATIENT
Start: 2024-02-29 | End: 2024-03-28

## 2024-02-29 RX ORDER — TOPIRAMATE 25 MG/1
25 TABLET ORAL 2 TIMES DAILY
Qty: 60 TABLET | Refills: 1 | Status: SHIPPED | OUTPATIENT
Start: 2024-02-29

## 2024-02-29 NOTE — PROGRESS NOTES
(ROXICODONE) 5 MG immediate release tablet Take 1 tablet by mouth 3 times daily as needed for Pain (max 3 per day) for up to 28 days. 84 tablet 0    topiramate (TOPAMAX) 25 MG tablet Take 1 tablet by mouth 2 times daily 60 tablet 1    nirmatrelvir/ritonavir 300/100 (PAXLOVID) 20 x 150 MG & 10 x 100MG TBPK Take 3 tablets by mouth in the morning and 3 tablets in the evening. On day 3 for COVID.      naloxone (NARCAN) 4 MG/0.1ML LIQD nasal spray 1 spray by Nasal route as needed for Opioid Reversal 1 each 0    ketoconazole (NIZORAL) 2 % shampoo WASH SCALP THREE TIMES PER WEEK 120 mL 3    FLUoxetine (PROZAC) 40 MG capsule       Prenatal Vit-Fe Fumarate-FA (PNV PRENATAL PLUS MULTIVITAMIN) 27-1 MG TABS       Prenatal Vit-DSS-Fe Cbn-FA (VIRT-CHAN GT) 90-1 MG TABS       omeprazole (PRILOSEC) 40 MG capsule Take 1 capsule by mouth daily      KLOR-CON M20 20 MEQ tablet TAKE TWO TABLETS BY MOUTH TWICE A  tablet 3    VIVELLE-DOT 0.05 MG/24HR APPLY 1 PATCH TO SKIN TWICE WEEKLY 8 patch 11    furosemide (LASIX) 20 MG tablet Take 1 tablet by mouth 3 times daily      therapeutic multivitamin-minerals (THERAGRAN-M) tablet Take 1 tablet by mouth daily       No current facility-administered medications for this visit.       General Goals of current treatment regimen include improvement in pain, restoration of functioning- with focus on improvement in physical performance, general activity, work or disability,emotional distress, health care utilization and  decreased medication consumption.   Will continue to monitor progress towards achieving/maintaining therapeutic goals with special emphasis on  1. Improvement in perceived interfernce  of pain with ADL's. Ability to do home exercises independently. Ability to do household chores indoor and/or outdoor work and social and leisure activities.Improve psychosocial and physical functioning. - she is maintaining her treatment goal with the current regimen.     She was advised against

## 2024-03-08 ENCOUNTER — HOSPITAL ENCOUNTER (OUTPATIENT)
Dept: MAMMOGRAPHY | Age: 69
Discharge: HOME | End: 2024-03-08
Payer: MEDICARE

## 2024-03-08 VITALS — HEIGHT: 61 IN | BODY MASS INDEX: 39.65 KG/M2 | WEIGHT: 210 LBS

## 2024-03-08 DIAGNOSIS — Z12.31 SCREENING MAMMOGRAM FOR BREAST CANCER: ICD-10-CM

## 2024-03-08 PROCEDURE — 77063 BREAST TOMOSYNTHESIS BI: CPT

## 2024-04-04 ENCOUNTER — OFFICE VISIT (OUTPATIENT)
Dept: PAIN MANAGEMENT | Age: 69
End: 2024-04-04
Payer: MEDICARE

## 2024-04-04 VITALS
WEIGHT: 220 LBS | HEART RATE: 70 BPM | SYSTOLIC BLOOD PRESSURE: 146 MMHG | OXYGEN SATURATION: 93 % | DIASTOLIC BLOOD PRESSURE: 75 MMHG | BODY MASS INDEX: 41.57 KG/M2

## 2024-04-04 DIAGNOSIS — M51.37 DEGENERATION OF LUMBAR OR LUMBOSACRAL INTERVERTEBRAL DISC: ICD-10-CM

## 2024-04-04 DIAGNOSIS — M17.0 PRIMARY OSTEOARTHRITIS OF BOTH KNEES: ICD-10-CM

## 2024-04-04 DIAGNOSIS — M19.90 ARTHRITIS: ICD-10-CM

## 2024-04-04 DIAGNOSIS — G89.4 CHRONIC PAIN SYNDROME: ICD-10-CM

## 2024-04-04 DIAGNOSIS — M79.7 FIBROMYALGIA: ICD-10-CM

## 2024-04-04 PROCEDURE — 1123F ACP DISCUSS/DSCN MKR DOCD: CPT | Performed by: INTERNAL MEDICINE

## 2024-04-04 PROCEDURE — 99213 OFFICE O/P EST LOW 20 MIN: CPT | Performed by: INTERNAL MEDICINE

## 2024-04-04 RX ORDER — TOPIRAMATE 25 MG/1
25 TABLET ORAL 2 TIMES DAILY
Qty: 60 TABLET | Refills: 1 | Status: SHIPPED | OUTPATIENT
Start: 2024-04-04

## 2024-04-04 RX ORDER — OXYCODONE HYDROCHLORIDE 5 MG/1
5 TABLET ORAL 3 TIMES DAILY PRN
Qty: 84 TABLET | Refills: 0 | Status: SHIPPED | OUTPATIENT
Start: 2024-04-04 | End: 2024-05-02

## 2024-04-04 RX ORDER — MORPHINE SULFATE 30 MG/1
30 TABLET, FILM COATED, EXTENDED RELEASE ORAL DAILY
Qty: 28 TABLET | Refills: 0 | Status: SHIPPED | OUTPATIENT
Start: 2024-04-04 | End: 2024-05-02

## 2024-04-04 NOTE — PROGRESS NOTES
Isabella Harris  1955  [id number]    HISTORY OF PRESENT ILLNESS:  Ms. Harris is a 69 y.o. female returns for a follow up visit for multiple medical problems.  Her  presenting problems are   1. Chronic pain syndrome    2. Arthritis    3. Mood disorder (HCC)    4. Fibromyalgia    5. Primary osteoarthritis of both knees    6. Primary insomnia    7. Degeneration of lumbar or lumbosacral intervertebral disc    8. Chronic migraine without aura without status migrainosus, not intractable    .    As per information/history obtained from the PADT(patient assessment and documentation tool) -  She complains of pain in the neck with radiation to the shoulders Bilateral, elbows Bilateral, hands Bilateral, knees Bilateral, ankles Bilateral, and feet Bilateral She rates the pain 6/10 and describes it as sharp, aching, burning.  Pain is made worse by: standing.  Current treatment regimen has helped relieve about 60% of the pain.  She denies side effects from the current pain regimen.   Patient reports that since last follow up visit the physical functioning is unchanged, family/social relationships are unchanged, mood is unchanged sleep patterns are unchanged.  Ms. Harris states that since starting the treatment with the current regimen the  overall functioning  in the above aspects is  better,Patient denies neurological bowel or bladder. Patient denies misusing/abusing her narcotic pain medications or using any illegal drugs.  There are No indicators for possible drug abuse, addiction or diversion problems, Upon obtaining the medical history from Ms. Harris regarding today's office visit for her presenting problems, patient states she has been doing fair, she is managing with the medications. Ms. Harris mentions she is using Ms Contin along with Oxycodone for breakthrough pain, she denies any side effects. Patient mentions she is using Neurontin 2400 mg. Patient reports her weight has been stable.

## 2024-05-02 ENCOUNTER — OFFICE VISIT (OUTPATIENT)
Dept: PAIN MANAGEMENT | Age: 69
End: 2024-05-02
Payer: MEDICARE

## 2024-05-02 VITALS — SYSTOLIC BLOOD PRESSURE: 135 MMHG | HEART RATE: 78 BPM | OXYGEN SATURATION: 91 % | DIASTOLIC BLOOD PRESSURE: 88 MMHG

## 2024-05-02 DIAGNOSIS — G89.4 CHRONIC PAIN SYNDROME: ICD-10-CM

## 2024-05-02 DIAGNOSIS — M79.7 FIBROMYALGIA: ICD-10-CM

## 2024-05-02 DIAGNOSIS — G43.709 CHRONIC MIGRAINE WITHOUT AURA WITHOUT STATUS MIGRAINOSUS, NOT INTRACTABLE: ICD-10-CM

## 2024-05-02 DIAGNOSIS — M51.37 DEGENERATION OF LUMBAR OR LUMBOSACRAL INTERVERTEBRAL DISC: ICD-10-CM

## 2024-05-02 DIAGNOSIS — M17.0 PRIMARY OSTEOARTHRITIS OF BOTH KNEES: ICD-10-CM

## 2024-05-02 DIAGNOSIS — M19.90 ARTHRITIS: ICD-10-CM

## 2024-05-02 PROCEDURE — 99213 OFFICE O/P EST LOW 20 MIN: CPT | Performed by: INTERNAL MEDICINE

## 2024-05-02 PROCEDURE — 1123F ACP DISCUSS/DSCN MKR DOCD: CPT | Performed by: INTERNAL MEDICINE

## 2024-05-02 RX ORDER — TOPIRAMATE 25 MG/1
25 TABLET ORAL 2 TIMES DAILY
Qty: 60 TABLET | Refills: 1 | Status: SHIPPED | OUTPATIENT
Start: 2024-05-02

## 2024-05-02 RX ORDER — GABAPENTIN 600 MG/1
TABLET ORAL
Qty: 360 TABLET | Refills: 0 | Status: SHIPPED | OUTPATIENT
Start: 2024-05-02 | End: 2024-08-21

## 2024-05-02 RX ORDER — DICLOFENAC SODIUM 75 MG/1
75 TABLET, DELAYED RELEASE ORAL DAILY
Qty: 90 TABLET | Refills: 0 | Status: SHIPPED | OUTPATIENT
Start: 2024-05-02

## 2024-05-02 RX ORDER — MONTELUKAST SODIUM 10 MG/1
TABLET ORAL
COMMUNITY
Start: 2024-04-08

## 2024-05-02 RX ORDER — MORPHINE SULFATE 30 MG/1
30 TABLET, FILM COATED, EXTENDED RELEASE ORAL DAILY
Qty: 28 TABLET | Refills: 0 | Status: SHIPPED | OUTPATIENT
Start: 2024-05-02 | End: 2024-05-30

## 2024-05-02 RX ORDER — TOLTERODINE 4 MG/1
4 CAPSULE, EXTENDED RELEASE ORAL DAILY
COMMUNITY
Start: 2023-09-21

## 2024-05-02 RX ORDER — OXYCODONE HYDROCHLORIDE 5 MG/1
5 TABLET ORAL 3 TIMES DAILY PRN
Qty: 84 TABLET | Refills: 0 | Status: SHIPPED | OUTPATIENT
Start: 2024-05-02 | End: 2024-05-30

## 2024-05-02 RX ORDER — CLOPIDOGREL BISULFATE 75 MG/1
75 TABLET ORAL DAILY
COMMUNITY
Start: 2023-10-11

## 2024-05-02 RX ORDER — AMITRIPTYLINE HYDROCHLORIDE 25 MG/1
25-50 TABLET, FILM COATED ORAL NIGHTLY
Qty: 180 TABLET | Refills: 0 | Status: SHIPPED | OUTPATIENT
Start: 2024-05-02

## 2024-05-02 NOTE — PROGRESS NOTES
Isabella Harris  1955  [id number]      HISTORY OF PRESENT ILLNESS:  Ms. Harris is a 69 y.o. female returns for a follow up visit for pain management  She has a diagnosis of   1. Chronic pain syndrome    2. Chronic migraine without aura without status migrainosus, not intractable    3. Arthritis    4. Primary insomnia    5. Fibromyalgia    6. Degeneration of lumbar or lumbosacral intervertebral disc    7. Primary osteoarthritis of both knees    8. Mood disorder (HCC)    .      As per information/history obtained from the PADT(patient assessment and documentation tool) -  She complains of pain in the neck and lower back with radiation to the shoulders Bilateral, elbows Bilateral, hands Bilateral, buttocks, knees Bilateral, ankles Bilateral, and feet Bilateral She rates the pain 6/10 and describes it as aching, burning, numbness.  Pain is made worse by: movement, walking, standing, sitting, bending. She denies any side effects from the current pain regimen. Patient reports that since last follow up visit the physical functioning is unchanged, family/social relationships are unchanged, mood is unchanged sleep patterns are unchanged. Ms. Harris states that since starting the treatment with the current regimen the  overall functioning  in the above aspects is  better, Patient denies misusing/abusing her narcotic pain medications or using any illegal drugs.  There are No indicators for possible drug abuse, addiction or diversion problems. Upon obtaining the medical history from Ms. Harris regarding today's office visit for her presenting problems, patient states she has been doing fair, she is managing okay with the medications. Ms. Harris mentions she is using Ms Contin along with Oxycodone for breakthrough pain. She states she has allergy like symptoms. Patient says her headache symptoms are manageable.        ALLERGIES: Patients list of allergies were reviewed     MEDICATIONS: Ms. Harris list of

## 2024-05-30 ENCOUNTER — OFFICE VISIT (OUTPATIENT)
Dept: PAIN MANAGEMENT | Age: 69
End: 2024-05-30
Payer: MEDICARE

## 2024-05-30 VITALS
WEIGHT: 213 LBS | BODY MASS INDEX: 40.25 KG/M2 | OXYGEN SATURATION: 95 % | SYSTOLIC BLOOD PRESSURE: 127 MMHG | HEART RATE: 76 BPM | DIASTOLIC BLOOD PRESSURE: 80 MMHG

## 2024-05-30 DIAGNOSIS — Z79.899 ENCOUNTER FOR LONG-TERM (CURRENT) USE OF HIGH-RISK MEDICATION: ICD-10-CM

## 2024-05-30 DIAGNOSIS — M51.37 DEGENERATION OF LUMBAR OR LUMBOSACRAL INTERVERTEBRAL DISC: ICD-10-CM

## 2024-05-30 DIAGNOSIS — G43.709 CHRONIC MIGRAINE WITHOUT AURA WITHOUT STATUS MIGRAINOSUS, NOT INTRACTABLE: ICD-10-CM

## 2024-05-30 DIAGNOSIS — M17.0 PRIMARY OSTEOARTHRITIS OF BOTH KNEES: ICD-10-CM

## 2024-05-30 DIAGNOSIS — G89.4 CHRONIC PAIN SYNDROME: ICD-10-CM

## 2024-05-30 DIAGNOSIS — F39 MOOD DISORDER (HCC): ICD-10-CM

## 2024-05-30 DIAGNOSIS — M19.90 ARTHRITIS: ICD-10-CM

## 2024-05-30 DIAGNOSIS — M79.7 FIBROMYALGIA: ICD-10-CM

## 2024-05-30 DIAGNOSIS — F51.01 PRIMARY INSOMNIA: ICD-10-CM

## 2024-05-30 PROCEDURE — 1123F ACP DISCUSS/DSCN MKR DOCD: CPT | Performed by: INTERNAL MEDICINE

## 2024-05-30 PROCEDURE — 99214 OFFICE O/P EST MOD 30 MIN: CPT | Performed by: INTERNAL MEDICINE

## 2024-05-30 RX ORDER — TOPIRAMATE 25 MG/1
25 TABLET ORAL 2 TIMES DAILY
Qty: 60 TABLET | Refills: 1 | Status: SHIPPED | OUTPATIENT
Start: 2024-05-30

## 2024-05-30 RX ORDER — MORPHINE SULFATE 30 MG/1
30 TABLET, FILM COATED, EXTENDED RELEASE ORAL DAILY
Qty: 28 TABLET | Refills: 0 | Status: SHIPPED | OUTPATIENT
Start: 2024-05-30 | End: 2024-06-27

## 2024-05-30 RX ORDER — GABAPENTIN 600 MG/1
600 TABLET ORAL 3 TIMES DAILY
Qty: 90 TABLET | Refills: 1 | Status: SHIPPED | OUTPATIENT
Start: 2024-05-30 | End: 2024-07-29

## 2024-05-30 RX ORDER — OXYCODONE HYDROCHLORIDE 5 MG/1
5 TABLET ORAL 3 TIMES DAILY PRN
Qty: 84 TABLET | Refills: 0 | Status: SHIPPED | OUTPATIENT
Start: 2024-05-30 | End: 2024-06-27

## 2024-05-30 NOTE — PROGRESS NOTES
sleep is fair. Has fairly normal sleep latency. Averages about 4-6 hours of sleep a night. Denies any signs of sleep apnea. Feels somewhat rested in the morning.  She mentions she's on Ms.Contin with Oxycodone for btp. She denies any constipation symptoms. Patient's  subjective report of her mood is fair. she describes occasional symptoms of depression, occasional  irritability and some mood swings. Describes her mood as being neutral and reports some pleasure in her daily activities. Reports  fair  appetite, energy and concentration. Able to function well in different aspects of her daily activities. Denies suicidal or homicidal ideation. Denies any complaints of increased tension, does   Worry sometimes and occasional  irritability  she denies any c/o increased anxiety, No c/o panic attacks or symptoms of PTSD. She thinks she's using Neurontin 3-4 x per day, her  dispenses medications. Ms. Harris reports she is managing her ADL's and house chores and activities. Has increased tolerance  for sitting , standing and walking by almost 30%. Has increased household chores- laundry 1-2 x s a week , cooking and light cleaning.      ALLERGIES/PAST MED/FAM/SOC HISTORY: Ms. Harris allergies, past medical, family and social history were reviewed in the chart.    Ms. Harris current medications are   Outpatient Medications Prior to Visit   Medication Sig Dispense Refill    montelukast (SINGULAIR) 10 MG tablet       tolterodine (DETROL LA) 4 MG extended release capsule Take 1 capsule by mouth daily      amitriptyline (ELAVIL) 25 MG tablet Take 1-2 tablets by mouth nightly 180 tablet 0    gabapentin (NEURONTIN) 600 MG tablet Take 1 tablet po in the morning, take 1 tablet po in afternoon, take 2 tablets po in the evening 360 tablet 0    morphine (MS CONTIN) 30 MG extended release tablet Take 1 tablet by mouth daily for 28 days. 28 tablet 0    oxyCODONE (ROXICODONE) 5 MG immediate release tablet Take 1 tablet by

## 2024-06-27 ENCOUNTER — OFFICE VISIT (OUTPATIENT)
Dept: PAIN MANAGEMENT | Age: 69
End: 2024-06-27
Payer: MEDICARE

## 2024-06-27 VITALS
OXYGEN SATURATION: 95 % | WEIGHT: 217 LBS | BODY MASS INDEX: 41 KG/M2 | HEART RATE: 70 BPM | DIASTOLIC BLOOD PRESSURE: 47 MMHG | SYSTOLIC BLOOD PRESSURE: 117 MMHG

## 2024-06-27 DIAGNOSIS — G89.4 CHRONIC PAIN SYNDROME: ICD-10-CM

## 2024-06-27 DIAGNOSIS — M51.37 DEGENERATION OF LUMBAR OR LUMBOSACRAL INTERVERTEBRAL DISC: ICD-10-CM

## 2024-06-27 DIAGNOSIS — M17.0 PRIMARY OSTEOARTHRITIS OF BOTH KNEES: ICD-10-CM

## 2024-06-27 DIAGNOSIS — M19.90 ARTHRITIS: ICD-10-CM

## 2024-06-27 DIAGNOSIS — M79.7 FIBROMYALGIA: ICD-10-CM

## 2024-06-27 PROCEDURE — 99213 OFFICE O/P EST LOW 20 MIN: CPT | Performed by: INTERNAL MEDICINE

## 2024-06-27 PROCEDURE — 1123F ACP DISCUSS/DSCN MKR DOCD: CPT | Performed by: INTERNAL MEDICINE

## 2024-06-27 RX ORDER — TOPIRAMATE 25 MG/1
25 TABLET ORAL 2 TIMES DAILY
Qty: 60 TABLET | Refills: 1 | Status: SHIPPED | OUTPATIENT
Start: 2024-06-27

## 2024-06-27 RX ORDER — GABAPENTIN 600 MG/1
600 TABLET ORAL 2 TIMES DAILY
Qty: 60 TABLET | Refills: 1 | Status: SHIPPED | OUTPATIENT
Start: 2024-06-27 | End: 2024-08-26

## 2024-06-27 RX ORDER — OXYCODONE HYDROCHLORIDE 5 MG/1
5 TABLET ORAL 3 TIMES DAILY PRN
Qty: 84 TABLET | Refills: 0 | Status: SHIPPED | OUTPATIENT
Start: 2024-06-27 | End: 2024-07-25

## 2024-06-27 RX ORDER — MORPHINE SULFATE 30 MG/1
30 TABLET, FILM COATED, EXTENDED RELEASE ORAL DAILY
Qty: 28 TABLET | Refills: 0 | Status: SHIPPED | OUTPATIENT
Start: 2024-06-27 | End: 2024-07-25

## 2024-06-28 NOTE — PROGRESS NOTES
Isabella Harris  1955  [id number]      HISTORY OF PRESENT ILLNESS:  Ms. Harris is a 69 y.o. female returns for a follow up visit for pain management  She has a diagnosis of   1. Chronic pain syndrome    2. Chronic migraine without aura without status migrainosus, not intractable    3. Arthritis    4. Primary osteoarthritis of both knees    5. Degeneration of lumbar or lumbosacral intervertebral disc    6. Fibromyalgia    7. Mood disorder (HCC)    8. Primary insomnia    .      As per information/history obtained from the PADT(patient assessment and documentation tool) -  She complains of pain in the neck and lower back with radiation to the shoulders Bilateral, elbows Bilateral, hands Bilateral, knees Bilateral, ankles Bilateral, and feet Bilateral She rates the pain 6/10 and describes it as sharp.  Pain is made worse by: movement, walking, standing, sitting, bending, lifting. She denies any side effects from the current pain regimen. Patient reports that since last follow up visit the physical functioning is unchanged, family/social relationships are unchanged, mood is unchanged sleep patterns are unchanged. Ms. Harris states that since starting the treatment with the current regimen the  overall functioning  in the above aspects is  better, Patient denies misusing/abusing her narcotic pain medications or using any illegal drugs.  There are No indicators for possible drug abuse, addiction or diversion problems.   Upon obtaining the medical history from Ms. Harris regarding today's office visit for her presenting problems, Patient reports she has been doing about the same. She complains the pain is worse in the back than the legs. She says she's managing with the medications. She states she's using Ms.Contin with Oxycodone for btp. She denies any constipation symptoms. She complains it hurts everywhere. She states she's managing her ADLs and house chores.       ALLERGIES: Patients list of allergies

## 2024-07-25 ENCOUNTER — OFFICE VISIT (OUTPATIENT)
Dept: PAIN MANAGEMENT | Age: 69
End: 2024-07-25
Payer: MEDICARE

## 2024-07-25 VITALS
BODY MASS INDEX: 41.19 KG/M2 | WEIGHT: 218 LBS | HEART RATE: 72 BPM | DIASTOLIC BLOOD PRESSURE: 68 MMHG | SYSTOLIC BLOOD PRESSURE: 118 MMHG | OXYGEN SATURATION: 96 %

## 2024-07-25 DIAGNOSIS — M17.0 PRIMARY OSTEOARTHRITIS OF BOTH KNEES: ICD-10-CM

## 2024-07-25 DIAGNOSIS — M79.7 FIBROMYALGIA: ICD-10-CM

## 2024-07-25 DIAGNOSIS — M51.37 DEGENERATION OF LUMBAR OR LUMBOSACRAL INTERVERTEBRAL DISC: ICD-10-CM

## 2024-07-25 DIAGNOSIS — G89.4 CHRONIC PAIN SYNDROME: ICD-10-CM

## 2024-07-25 DIAGNOSIS — M19.90 ARTHRITIS: ICD-10-CM

## 2024-07-25 PROCEDURE — 99213 OFFICE O/P EST LOW 20 MIN: CPT | Performed by: INTERNAL MEDICINE

## 2024-07-25 PROCEDURE — 1123F ACP DISCUSS/DSCN MKR DOCD: CPT | Performed by: INTERNAL MEDICINE

## 2024-07-25 RX ORDER — AMITRIPTYLINE HYDROCHLORIDE 25 MG/1
25-50 TABLET, FILM COATED ORAL NIGHTLY
Qty: 180 TABLET | Refills: 0 | Status: SHIPPED | OUTPATIENT
Start: 2024-07-25

## 2024-07-25 RX ORDER — TOPIRAMATE 25 MG/1
25 TABLET ORAL 2 TIMES DAILY
Qty: 60 TABLET | Refills: 1 | Status: SHIPPED | OUTPATIENT
Start: 2024-07-25

## 2024-07-25 RX ORDER — OXYCODONE HYDROCHLORIDE 5 MG/1
5 TABLET ORAL 3 TIMES DAILY PRN
Qty: 84 TABLET | Refills: 0 | Status: SHIPPED | OUTPATIENT
Start: 2024-07-25 | End: 2024-08-22

## 2024-07-25 RX ORDER — MORPHINE SULFATE 30 MG/1
30 TABLET, FILM COATED, EXTENDED RELEASE ORAL DAILY
Qty: 28 TABLET | Refills: 0 | Status: SHIPPED | OUTPATIENT
Start: 2024-07-25 | End: 2024-08-22

## 2024-07-25 RX ORDER — GABAPENTIN 600 MG/1
600 TABLET ORAL 2 TIMES DAILY
Qty: 60 TABLET | Refills: 1 | Status: SHIPPED | OUTPATIENT
Start: 2024-07-25 | End: 2024-09-23

## 2024-07-25 NOTE — PROGRESS NOTES
Isabella Harris  1955  [id number]      HISTORY OF PRESENT ILLNESS:  Ms. Harris is a 69 y.o. female returns for a follow up visit for pain management  She has a diagnosis of   1. Chronic pain syndrome    2. Mood disorder (HCC)    3. Chronic migraine without aura without status migrainosus, not intractable    4. Primary osteoarthritis of both knees    5. Primary insomnia    6. Degeneration of lumbar or lumbosacral intervertebral disc    7. Fibromyalgia    .      As per information/history obtained from the PADT(patient assessment and documentation tool) -  She complains of pain in the arms Bilateral and lower back with radiation to the elbows Bilateral, hands Bilateral, upper leg Bilateral, knees Bilateral, and lower leg Bilateral She rates the pain 6/10 and describes it as sharp, aching.  Pain is made worse by: standing. She denies any side effects from the current pain regimen. Patient reports that since last follow up visit the physical functioning is unchanged, family/social relationships are unchanged, mood is unchanged sleep patterns are unchanged. Ms. Harris states that since starting the treatment with the current regimen the  overall functioning  in the above aspects is  better, Patient denies misusing/abusing her narcotic pain medications or using any illegal drugs.  There are No indicators for possible drug abuse, addiction or diversion problems.     Upon obtaining the medical history from Ms. Harris regarding today's office visit for her presenting problems, Patient reports she has been doing fair, managing with the medications. She says she's using Ms.Contin with Oxycodone 3 per day for btp. She denies any constipation symptoms. She says her headache symptoms are manageable. She states her weight has been stable.       ALLERGIES: Patients list of allergies were reviewed     MEDICATIONS: Ms. Harris list of medications were reviewed.Her current medications are   Outpatient Medications

## 2024-07-26 ENCOUNTER — TELEPHONE (OUTPATIENT)
Dept: PAIN MANAGEMENT | Age: 69
End: 2024-07-26

## 2024-07-26 NOTE — TELEPHONE ENCOUNTER
Submitted PA for [facility] Via CM Key: [id number]  STATUS: \" Approved, Coverage Starts on: 4/26/2024 12:00:00 AM, Coverage Ends on: 1/22/2025  \"    The patient was notified by .

## 2024-08-22 ENCOUNTER — OFFICE VISIT (OUTPATIENT)
Dept: PAIN MANAGEMENT | Age: 69
End: 2024-08-22
Payer: MEDICARE

## 2024-08-22 VITALS
WEIGHT: 218 LBS | SYSTOLIC BLOOD PRESSURE: 130 MMHG | BODY MASS INDEX: 41.19 KG/M2 | OXYGEN SATURATION: 94 % | DIASTOLIC BLOOD PRESSURE: 82 MMHG | HEART RATE: 75 BPM

## 2024-08-22 DIAGNOSIS — M79.7 FIBROMYALGIA: ICD-10-CM

## 2024-08-22 DIAGNOSIS — Z91.89 AT RISK FOR RESPIRATORY DEPRESSION DUE TO OPIOID: ICD-10-CM

## 2024-08-22 DIAGNOSIS — M19.90 ARTHRITIS: ICD-10-CM

## 2024-08-22 DIAGNOSIS — M17.0 PRIMARY OSTEOARTHRITIS OF BOTH KNEES: ICD-10-CM

## 2024-08-22 DIAGNOSIS — M51.37 DEGENERATION OF LUMBAR OR LUMBOSACRAL INTERVERTEBRAL DISC: ICD-10-CM

## 2024-08-22 DIAGNOSIS — G89.4 CHRONIC PAIN SYNDROME: ICD-10-CM

## 2024-08-22 PROCEDURE — 1123F ACP DISCUSS/DSCN MKR DOCD: CPT | Performed by: INTERNAL MEDICINE

## 2024-08-22 PROCEDURE — 99213 OFFICE O/P EST LOW 20 MIN: CPT | Performed by: INTERNAL MEDICINE

## 2024-08-22 RX ORDER — MORPHINE SULFATE 30 MG/1
30 TABLET, FILM COATED, EXTENDED RELEASE ORAL DAILY
Qty: 28 TABLET | Refills: 0 | Status: SHIPPED | OUTPATIENT
Start: 2024-08-22 | End: 2024-09-19

## 2024-08-22 RX ORDER — TOPIRAMATE 25 MG/1
25 TABLET, FILM COATED ORAL 2 TIMES DAILY
Qty: 60 TABLET | Refills: 1 | Status: SHIPPED | OUTPATIENT
Start: 2024-08-22

## 2024-08-22 RX ORDER — GABAPENTIN 600 MG/1
600 TABLET ORAL 2 TIMES DAILY
Qty: 60 TABLET | Refills: 1 | Status: SHIPPED | OUTPATIENT
Start: 2024-08-22 | End: 2024-10-21

## 2024-08-22 RX ORDER — OXYCODONE HYDROCHLORIDE 5 MG/1
5 TABLET ORAL 3 TIMES DAILY PRN
Qty: 84 TABLET | Refills: 0 | Status: SHIPPED | OUTPATIENT
Start: 2024-08-22 | End: 2024-09-19

## 2024-08-23 NOTE — PROGRESS NOTES
increase activity and exercise as tolerated and relax regularly and enjoy meals   -Continue with all other adjuvants    -Headache symptoms have been manageable   -She was advised weight reduction, diet changes- 800-1200 idalia diet, diet diary, exercising, nutritional  consult increased physical activity as tolerated    Current Outpatient Medications   Medication Sig Dispense Refill    diclofenac sodium (VOLTAREN) 1 % GEL Apply 2-4 grams to affected area  g 1    gabapentin (NEURONTIN) 600 MG tablet Take 1 tablet by mouth 2 times daily for 60 days. 60 tablet 1    morphine (MS CONTIN) 30 MG extended release tablet Take 1 tablet by mouth daily for 28 days. 28 tablet 0    naloxone (NARCAN) 4 MG/0.1ML LIQD nasal spray 1 spray by Nasal route as needed for Opioid Reversal 1 each 0    oxyCODONE (ROXICODONE) 5 MG immediate release tablet Take 1 tablet by mouth 3 times daily as needed for Pain (max 3 per day) for up to 28 days. 84 tablet 0    topiramate (TOPAMAX) 25 MG tablet Take 1 tablet by mouth 2 times daily 60 tablet 1    amitriptyline (ELAVIL) 25 MG tablet Take 1-2 tablets by mouth nightly 180 tablet 0    montelukast (SINGULAIR) 10 MG tablet       tolterodine (DETROL LA) 4 MG extended release capsule Take 1 capsule by mouth daily      nirmatrelvir/ritonavir 300/100 (PAXLOVID) 20 x 150 MG & 10 x 100MG TBPK Take 3 tablets by mouth in the morning and 3 tablets in the evening. On day 3 for COVID.      ketoconazole (NIZORAL) 2 % shampoo WASH SCALP THREE TIMES PER WEEK 120 mL 3    FLUoxetine (PROZAC) 40 MG capsule       Prenatal Vit-Fe Fumarate-FA (PNV PRENATAL PLUS MULTIVITAMIN) 27-1 MG TABS       Prenatal Vit-DSS-Fe Cbn-FA (VIRT-CHAN GT) 90-1 MG TABS       omeprazole (PRILOSEC) 40 MG capsule Take 1 capsule by mouth daily      KLOR-CON M20 20 MEQ tablet TAKE TWO TABLETS BY MOUTH TWICE A  tablet 3    VIVELLE-DOT 0.05 MG/24HR APPLY 1 PATCH TO SKIN TWICE WEEKLY 8 patch 11    furosemide (LASIX) 20 MG tablet Take 1

## 2024-09-19 ENCOUNTER — OFFICE VISIT (OUTPATIENT)
Dept: PAIN MANAGEMENT | Age: 69
End: 2024-09-19
Payer: MEDICARE

## 2024-09-19 VITALS
HEART RATE: 73 BPM | BODY MASS INDEX: 39.87 KG/M2 | WEIGHT: 211 LBS | DIASTOLIC BLOOD PRESSURE: 82 MMHG | SYSTOLIC BLOOD PRESSURE: 122 MMHG

## 2024-09-19 DIAGNOSIS — Z91.89 AT RISK FOR RESPIRATORY DEPRESSION DUE TO OPIOID: ICD-10-CM

## 2024-09-19 DIAGNOSIS — G89.4 CHRONIC PAIN SYNDROME: ICD-10-CM

## 2024-09-19 DIAGNOSIS — M79.7 FIBROMYALGIA: ICD-10-CM

## 2024-09-19 DIAGNOSIS — M17.0 PRIMARY OSTEOARTHRITIS OF BOTH KNEES: ICD-10-CM

## 2024-09-19 DIAGNOSIS — M51.37 DEGENERATION OF LUMBAR OR LUMBOSACRAL INTERVERTEBRAL DISC: ICD-10-CM

## 2024-09-19 DIAGNOSIS — M19.90 ARTHRITIS: ICD-10-CM

## 2024-09-19 PROCEDURE — 99213 OFFICE O/P EST LOW 20 MIN: CPT | Performed by: INTERNAL MEDICINE

## 2024-09-19 PROCEDURE — 1123F ACP DISCUSS/DSCN MKR DOCD: CPT | Performed by: INTERNAL MEDICINE

## 2024-09-19 RX ORDER — DICLOFENAC SODIUM 75 MG/1
75 TABLET, DELAYED RELEASE ORAL DAILY PRN
Qty: 30 TABLET | Refills: 0 | Status: SHIPPED | OUTPATIENT
Start: 2024-09-19

## 2024-09-19 RX ORDER — MORPHINE SULFATE 30 MG/1
30 TABLET, FILM COATED, EXTENDED RELEASE ORAL DAILY
Qty: 28 TABLET | Refills: 0 | Status: SHIPPED | OUTPATIENT
Start: 2024-09-19 | End: 2024-10-17

## 2024-09-19 RX ORDER — OXYCODONE HYDROCHLORIDE 5 MG/1
5 TABLET ORAL 3 TIMES DAILY PRN
Qty: 84 TABLET | Refills: 0 | Status: SHIPPED | OUTPATIENT
Start: 2024-09-19 | End: 2024-10-17

## 2024-09-19 RX ORDER — GABAPENTIN 600 MG/1
600 TABLET ORAL 2 TIMES DAILY
Qty: 60 TABLET | Refills: 1 | Status: SHIPPED | OUTPATIENT
Start: 2024-09-19 | End: 2024-11-18

## 2024-09-19 RX ORDER — TOPIRAMATE 25 MG/1
25 TABLET, FILM COATED ORAL 2 TIMES DAILY
Qty: 60 TABLET | Refills: 1 | Status: SHIPPED | OUTPATIENT
Start: 2024-09-19

## 2024-10-05 DIAGNOSIS — M19.90 ARTHRITIS: ICD-10-CM

## 2024-10-05 DIAGNOSIS — M17.0 PRIMARY OSTEOARTHRITIS OF BOTH KNEES: ICD-10-CM

## 2024-10-05 DIAGNOSIS — G89.4 CHRONIC PAIN SYNDROME: ICD-10-CM

## 2024-10-05 DIAGNOSIS — M79.7 FIBROMYALGIA: ICD-10-CM

## 2024-10-08 RX ORDER — DICLOFENAC SODIUM 75 MG/1
75 TABLET, DELAYED RELEASE ORAL DAILY PRN
Qty: 30 TABLET | Refills: 0 | OUTPATIENT
Start: 2024-10-08

## 2024-10-17 ENCOUNTER — OFFICE VISIT (OUTPATIENT)
Dept: PAIN MANAGEMENT | Age: 69
End: 2024-10-17
Payer: MEDICARE

## 2024-10-17 VITALS
WEIGHT: 213 LBS | DIASTOLIC BLOOD PRESSURE: 65 MMHG | HEART RATE: 84 BPM | BODY MASS INDEX: 40.25 KG/M2 | OXYGEN SATURATION: 95 % | SYSTOLIC BLOOD PRESSURE: 117 MMHG

## 2024-10-17 DIAGNOSIS — Z51.81 ENCOUNTER FOR THERAPEUTIC DRUG MONITORING: ICD-10-CM

## 2024-10-17 DIAGNOSIS — M51.372 DEGENERATION OF INTERVERTEBRAL DISC OF LUMBOSACRAL REGION WITH DISCOGENIC BACK PAIN AND LOWER EXTREMITY PAIN: ICD-10-CM

## 2024-10-17 DIAGNOSIS — K43.2 RECURRENT INCISIONAL HERNIA WITH COMPLICATION: ICD-10-CM

## 2024-10-17 DIAGNOSIS — M79.7 FIBROMYALGIA: ICD-10-CM

## 2024-10-17 DIAGNOSIS — M79.2 NEUROPATHIC PAIN: ICD-10-CM

## 2024-10-17 DIAGNOSIS — G89.4 CHRONIC PAIN SYNDROME: ICD-10-CM

## 2024-10-17 DIAGNOSIS — F39 MOOD DISORDER (HCC): ICD-10-CM

## 2024-10-17 DIAGNOSIS — M17.0 PRIMARY OSTEOARTHRITIS OF BOTH KNEES: ICD-10-CM

## 2024-10-17 DIAGNOSIS — L03.311 ABDOMINAL WALL CELLULITIS: ICD-10-CM

## 2024-10-17 DIAGNOSIS — G43.709 CHRONIC MIGRAINE WITHOUT AURA WITHOUT STATUS MIGRAINOSUS, NOT INTRACTABLE: ICD-10-CM

## 2024-10-17 DIAGNOSIS — F51.01 PRIMARY INSOMNIA: ICD-10-CM

## 2024-10-17 PROCEDURE — 99214 OFFICE O/P EST MOD 30 MIN: CPT | Performed by: INTERNAL MEDICINE

## 2024-10-17 PROCEDURE — 1123F ACP DISCUSS/DSCN MKR DOCD: CPT | Performed by: INTERNAL MEDICINE

## 2024-10-17 RX ORDER — GABAPENTIN 600 MG/1
600 TABLET ORAL 2 TIMES DAILY
Qty: 60 TABLET | Refills: 1 | Status: SHIPPED | OUTPATIENT
Start: 2024-10-17 | End: 2024-12-16

## 2024-10-17 RX ORDER — TOPIRAMATE 25 MG/1
25 TABLET, FILM COATED ORAL 2 TIMES DAILY
Qty: 60 TABLET | Refills: 1 | Status: SHIPPED | OUTPATIENT
Start: 2024-10-17

## 2024-10-17 RX ORDER — DICLOFENAC SODIUM 75 MG/1
75 TABLET, DELAYED RELEASE ORAL DAILY PRN
Qty: 30 TABLET | Refills: 0 | Status: SHIPPED | OUTPATIENT
Start: 2024-10-17

## 2024-10-17 RX ORDER — MORPHINE SULFATE 15 MG/1
15 TABLET, FILM COATED, EXTENDED RELEASE ORAL 3 TIMES DAILY
Qty: 84 TABLET | Refills: 0 | Status: SHIPPED | OUTPATIENT
Start: 2024-10-17 | End: 2024-11-14

## 2024-10-17 NOTE — PROGRESS NOTES
Isabella Harris  1955  [id number]    HISTORY OF PRESENT ILLNESS:  Ms. Harris is a 69 y.o. female returns for a follow up visit for multiple medical problems.  Her  presenting problems are   1. Chronic pain syndrome    2. Mood disorder (HCC)    3. Primary osteoarthritis of both knees    4. Chronic migraine without aura without status migrainosus, not intractable    5. Fibromyalgia    6. Primary insomnia    7. Encounter for therapeutic drug monitoring    8. Degeneration of intervertebral disc of lumbosacral region with discogenic back pain and lower extremity pain    9. Recurrent incisional hernia with complication    10. Abdominal wall cellulitis    11. Neuropathic pain    .    As per information/history obtained from the PADT(patient assessment and documentation tool) -  She complains of pain in the neck, elbows Bilateral, lower back, and knees Bilateral with radiation to the shoulders Bilateral She rates the pain 7/10 and describes it as aching, burning.  Pain is made worse by: movement, walking, standing, sitting, bending, lifting.  Current treatment regimen has helped relieve about 70% of the pain.  She denies side effects from the current pain regimen.   Patient reports that since last follow up visit the physical functioning is unchanged, family/social relationships are unchanged, mood is unchanged sleep patterns are unchanged.  Ms. Harris states that since starting the treatment with the current regimen the  overall functioning  in the above aspects is  better,Patient denies neurological bowel or bladder. Patient denies misusing/abusing her narcotic pain medications or using any illegal drugs.  There are No indicators for possible drug abuse, addiction or diversion problems.   Upon obtaining the medical history from Ms. Harris regarding today's office visit for her presenting problems, patient states she has been doing fair, managing with the medications. she states that the medications are

## 2024-11-09 DIAGNOSIS — M79.7 FIBROMYALGIA: ICD-10-CM

## 2024-11-09 DIAGNOSIS — M17.0 PRIMARY OSTEOARTHRITIS OF BOTH KNEES: ICD-10-CM

## 2024-11-09 DIAGNOSIS — G89.4 CHRONIC PAIN SYNDROME: ICD-10-CM

## 2024-11-12 RX ORDER — DICLOFENAC SODIUM 75 MG/1
75 TABLET, DELAYED RELEASE ORAL DAILY PRN
Qty: 30 TABLET | Refills: 0 | OUTPATIENT
Start: 2024-11-12

## 2024-11-15 ENCOUNTER — OFFICE VISIT (OUTPATIENT)
Dept: PAIN MANAGEMENT | Age: 69
End: 2024-11-15
Payer: MEDICARE

## 2024-11-15 VITALS
HEART RATE: 72 BPM | BODY MASS INDEX: 41 KG/M2 | DIASTOLIC BLOOD PRESSURE: 65 MMHG | WEIGHT: 217 LBS | OXYGEN SATURATION: 95 % | SYSTOLIC BLOOD PRESSURE: 112 MMHG

## 2024-11-15 DIAGNOSIS — M79.7 FIBROMYALGIA: ICD-10-CM

## 2024-11-15 DIAGNOSIS — G89.4 CHRONIC PAIN SYNDROME: ICD-10-CM

## 2024-11-15 DIAGNOSIS — M79.2 NEUROPATHIC PAIN: ICD-10-CM

## 2024-11-15 DIAGNOSIS — M17.0 PRIMARY OSTEOARTHRITIS OF BOTH KNEES: ICD-10-CM

## 2024-11-15 DIAGNOSIS — L03.311 ABDOMINAL WALL CELLULITIS: ICD-10-CM

## 2024-11-15 DIAGNOSIS — K43.2 RECURRENT INCISIONAL HERNIA WITH COMPLICATION: ICD-10-CM

## 2024-11-15 DIAGNOSIS — M51.372 DEGENERATION OF INTERVERTEBRAL DISC OF LUMBOSACRAL REGION WITH DISCOGENIC BACK PAIN AND LOWER EXTREMITY PAIN: ICD-10-CM

## 2024-11-15 PROCEDURE — 99213 OFFICE O/P EST LOW 20 MIN: CPT | Performed by: INTERNAL MEDICINE

## 2024-11-15 PROCEDURE — 1123F ACP DISCUSS/DSCN MKR DOCD: CPT | Performed by: INTERNAL MEDICINE

## 2024-11-15 PROCEDURE — 1159F MED LIST DOCD IN RCRD: CPT | Performed by: INTERNAL MEDICINE

## 2024-11-15 RX ORDER — MORPHINE SULFATE 15 MG/1
15 TABLET, FILM COATED, EXTENDED RELEASE ORAL 3 TIMES DAILY
Qty: 90 TABLET | Refills: 0 | Status: SHIPPED | OUTPATIENT
Start: 2024-11-15 | End: 2024-12-15

## 2024-11-15 NOTE — PROGRESS NOTES
symptoms. She mentions her headache symptoms are manageable. She reports she's is managing her ADLS.        ALLERGIES: Patients list of allergies were reviewed     MEDICATIONS: Ms. Harris list of medications were reviewed.Her current medications are   Outpatient Medications Prior to Visit   Medication Sig Dispense Refill    diclofenac (VOLTAREN) 75 MG EC tablet Take 1 tablet by mouth daily as needed for Pain 30 tablet 0    gabapentin (NEURONTIN) 600 MG tablet Take 1 tablet by mouth 2 times daily for 60 days. 60 tablet 1    topiramate (TOPAMAX) 25 MG tablet Take 1 tablet by mouth 2 times daily 60 tablet 1    naloxone (NARCAN) 4 MG/0.1ML LIQD nasal spray 1 spray by Nasal route as needed for Opioid Reversal 1 each 0    amitriptyline (ELAVIL) 25 MG tablet Take 1-2 tablets by mouth nightly 180 tablet 0    montelukast (SINGULAIR) 10 MG tablet       tolterodine (DETROL LA) 4 MG extended release capsule Take 1 capsule by mouth daily      nirmatrelvir/ritonavir 300/100 (PAXLOVID) 20 x 150 MG & 10 x 100MG TBPK Take 3 tablets by mouth in the morning and 3 tablets in the evening. On day 3 for COVID.      ketoconazole (NIZORAL) 2 % shampoo WASH SCALP THREE TIMES PER WEEK 120 mL 3    FLUoxetine (PROZAC) 40 MG capsule       Prenatal Vit-Fe Fumarate-FA (PNV PRENATAL PLUS MULTIVITAMIN) 27-1 MG TABS       Prenatal Vit-DSS-Fe Cbn-FA (VIRT-CHAN GT) 90-1 MG TABS       omeprazole (PRILOSEC) 40 MG capsule Take 1 capsule by mouth daily      KLOR-CON M20 20 MEQ tablet TAKE TWO TABLETS BY MOUTH TWICE A  tablet 3    VIVELLE-DOT 0.05 MG/24HR APPLY 1 PATCH TO SKIN TWICE WEEKLY 8 patch 11    furosemide (LASIX) 20 MG tablet Take 1 tablet by mouth 3 times daily      therapeutic multivitamin-minerals (THERAGRAN-M) tablet Take 1 tablet by mouth daily       No facility-administered medications prior to visit.        REVIEW OF SYSTEMS:    Respiratory: Negative for apnea, chest tightness and shortness of breath or change in baseline

## 2024-11-18 ENCOUNTER — TELEPHONE (OUTPATIENT)
Dept: PAIN MANAGEMENT | Age: 69
End: 2024-11-18

## 2024-11-18 NOTE — TELEPHONE ENCOUNTER
Called patient to inform her that Dr. Rowe discontinue her Roxicodone at her last visit, increase her Morphine to 15 TID.

## 2024-11-18 NOTE — TELEPHONE ENCOUNTER
Pt states that oxycodone was not sent to pharm at last office visit on 11.15.    Request call back today.

## 2024-11-20 ENCOUNTER — HOSPITAL ENCOUNTER (OUTPATIENT)
Age: 69
Discharge: HOME | End: 2024-11-20
Payer: MEDICARE

## 2024-11-20 LAB
ALBUMIN SERPL-MCNC: 3.9 G/DL (ref 3.4–5)
ALP SERPL-CCNC: 101 U/L (ref 40–129)
ALT SERPL-CCNC: 24 U/L (ref 10–40)
ANION GAP SERPL CALCULATED.3IONS-SCNC: 9 MMOL/L (ref 3–16)
AST SERPL-CCNC: 34 U/L (ref 15–37)
BASOPHILS # BLD: 0 K/UL (ref 0–0.2)
BASOPHILS NFR BLD: 0.7 %
BILIRUB DIRECT SERPL-MCNC: 0.2 MG/DL (ref 0–0.3)
BILIRUB INDIRECT SERPL-MCNC: 0.2 MG/DL (ref 0–1)
BILIRUB SERPL-MCNC: 0.4 MG/DL (ref 0–1)
BUN SERPL-MCNC: 14 MG/DL (ref 7–20)
CALCIUM SERPL-MCNC: 9.1 MG/DL (ref 8.3–10.6)
CHLORIDE SERPL-SCNC: 99 MMOL/L (ref 99–110)
CO2 SERPL-SCNC: 30 MMOL/L (ref 21–32)
CREAT SERPL-MCNC: 0.6 MG/DL (ref 0.6–1.2)
DEPRECATED RDW RBC AUTO: 13.6 % (ref 12.4–15.4)
EOSINOPHIL # BLD: 0.2 K/UL (ref 0–0.6)
EOSINOPHIL NFR BLD: 3.1 %
FOLATE SERPL-MCNC: 15.2 NG/ML (ref 4.78–24.2)
GFR SERPLBLD CREATININE-BSD FMLA CKD-EPI: >90 ML/MIN/{1.73_M2}
GLUCOSE SERPL-MCNC: 127 MG/DL (ref 70–99)
HCT VFR BLD AUTO: 42 % (ref 36–48)
HGB BLD-MCNC: 13.4 G/DL (ref 12–16)
LYMPHOCYTES # BLD: 1.4 K/UL (ref 1–5.1)
LYMPHOCYTES NFR BLD: 27.6 %
MCH RBC QN AUTO: 30.2 PG (ref 26–34)
MCHC RBC AUTO-ENTMCNC: 31.9 G/DL (ref 31–36)
MCV RBC AUTO: 94.8 FL (ref 80–100)
MONOCYTES # BLD: 0.3 K/UL (ref 0–1.3)
MONOCYTES NFR BLD: 7 %
NEUTROPHILS # BLD: 3.1 K/UL (ref 1.7–7.7)
NEUTROPHILS NFR BLD: 61.6 %
PHOSPHATE SERPL-MCNC: 3.1 MG/DL (ref 2.5–4.9)
PLATELET # BLD AUTO: 216 K/UL (ref 135–450)
PMV BLD AUTO: 6.9 FL (ref 5–10.5)
POTASSIUM SERPL-SCNC: 3.7 MMOL/L (ref 3.5–5.1)
PROT SERPL-MCNC: 7.4 G/DL (ref 6.4–8.2)
RBC # BLD AUTO: 4.43 M/UL (ref 4–5.2)
SODIUM SERPL-SCNC: 138 MMOL/L (ref 136–145)
VIT B12 SERPL-MCNC: 449 PG/ML (ref 211–911)
WBC # BLD AUTO: 5 K/UL (ref 4–11)

## 2024-11-20 PROCEDURE — 82746 ASSAY OF FOLIC ACID SERUM: CPT

## 2024-11-20 PROCEDURE — 80076 HEPATIC FUNCTION PANEL: CPT

## 2024-11-20 PROCEDURE — 84443 ASSAY THYROID STIM HORMONE: CPT

## 2024-11-20 PROCEDURE — 36415 COLL VENOUS BLD VENIPUNCTURE: CPT

## 2024-11-20 PROCEDURE — 85025 COMPLETE CBC W/AUTO DIFF WBC: CPT

## 2024-11-20 PROCEDURE — 82607 VITAMIN B-12: CPT

## 2024-11-20 PROCEDURE — 86780 TREPONEMA PALLIDUM: CPT

## 2024-11-20 PROCEDURE — 80061 LIPID PANEL: CPT

## 2024-11-20 PROCEDURE — 80069 RENAL FUNCTION PANEL: CPT

## 2024-11-21 LAB
CHOLEST SERPL-MCNC: 209 MG/DL (ref 0–199)
HDLC SERPL-MCNC: 49 MG/DL (ref 40–60)
LDLC SERPL CALC-MCNC: 142 MG/DL
REAGIN+T PALLIDUM IGG+IGM SERPL-IMP: NORMAL
TRIGL SERPL-MCNC: 92 MG/DL (ref 0–150)
TSH SERPL DL<=0.005 MIU/L-ACNC: 3.71 UIU/ML (ref 0.27–4.2)
VLDLC SERPL CALC-MCNC: 18 MG/DL

## 2024-12-02 DIAGNOSIS — M17.0 PRIMARY OSTEOARTHRITIS OF BOTH KNEES: ICD-10-CM

## 2024-12-02 DIAGNOSIS — G89.4 CHRONIC PAIN SYNDROME: ICD-10-CM

## 2024-12-02 DIAGNOSIS — M79.7 FIBROMYALGIA: ICD-10-CM

## 2024-12-03 RX ORDER — DICLOFENAC SODIUM 75 MG/1
75 TABLET, DELAYED RELEASE ORAL DAILY PRN
Qty: 30 TABLET | Refills: 0 | OUTPATIENT
Start: 2024-12-03

## 2024-12-16 ENCOUNTER — OFFICE VISIT (OUTPATIENT)
Dept: PAIN MANAGEMENT | Age: 69
End: 2024-12-16
Payer: MEDICARE

## 2024-12-16 VITALS
SYSTOLIC BLOOD PRESSURE: 121 MMHG | HEART RATE: 78 BPM | WEIGHT: 217 LBS | OXYGEN SATURATION: 97 % | DIASTOLIC BLOOD PRESSURE: 74 MMHG | BODY MASS INDEX: 41 KG/M2

## 2024-12-16 DIAGNOSIS — M17.0 PRIMARY OSTEOARTHRITIS OF BOTH KNEES: ICD-10-CM

## 2024-12-16 DIAGNOSIS — M51.372 DEGENERATION OF INTERVERTEBRAL DISC OF LUMBOSACRAL REGION WITH DISCOGENIC BACK PAIN AND LOWER EXTREMITY PAIN: ICD-10-CM

## 2024-12-16 DIAGNOSIS — K43.2 RECURRENT INCISIONAL HERNIA WITH COMPLICATION: ICD-10-CM

## 2024-12-16 DIAGNOSIS — L03.311 ABDOMINAL WALL CELLULITIS: ICD-10-CM

## 2024-12-16 DIAGNOSIS — M79.2 NEUROPATHIC PAIN: ICD-10-CM

## 2024-12-16 DIAGNOSIS — M79.7 FIBROMYALGIA: ICD-10-CM

## 2024-12-16 DIAGNOSIS — G89.4 CHRONIC PAIN SYNDROME: ICD-10-CM

## 2024-12-16 PROCEDURE — 1159F MED LIST DOCD IN RCRD: CPT | Performed by: INTERNAL MEDICINE

## 2024-12-16 PROCEDURE — 1123F ACP DISCUSS/DSCN MKR DOCD: CPT | Performed by: INTERNAL MEDICINE

## 2024-12-16 PROCEDURE — 99213 OFFICE O/P EST LOW 20 MIN: CPT | Performed by: INTERNAL MEDICINE

## 2024-12-16 RX ORDER — DICLOFENAC SODIUM 75 MG/1
75 TABLET, DELAYED RELEASE ORAL DAILY PRN
Qty: 30 TABLET | Refills: 0 | Status: SHIPPED | OUTPATIENT
Start: 2024-12-16

## 2024-12-16 RX ORDER — TOPIRAMATE 25 MG/1
25 TABLET, FILM COATED ORAL 2 TIMES DAILY
Qty: 60 TABLET | Refills: 1 | Status: SHIPPED | OUTPATIENT
Start: 2024-12-16

## 2024-12-16 RX ORDER — GABAPENTIN 600 MG/1
600 TABLET ORAL 2 TIMES DAILY
Qty: 60 TABLET | Refills: 1 | Status: SHIPPED | OUTPATIENT
Start: 2024-12-16 | End: 2025-02-14

## 2024-12-16 RX ORDER — MORPHINE SULFATE 15 MG/1
15 TABLET, FILM COATED, EXTENDED RELEASE ORAL 3 TIMES DAILY
Qty: 84 TABLET | Refills: 0 | Status: SHIPPED | OUTPATIENT
Start: 2024-12-16 | End: 2025-01-13

## 2024-12-16 NOTE — PROGRESS NOTES
psychosocial functioning  and help relieve suffering.  Current MED 45  Last UDS 10/24 consistent    Above medical history reviewed, Any changes were updated 12/25/24   -Interm history reviewed    -Labs reviewed; scans reviewed   -Follow up with Urology   -Continue with Ms Contin 15 mg 3 per day  -She was advised to increase fluids ( 5-7  glasses of fluid daily), limit caffeine, avoid cheese products, increase dietary fiber, increase activity and exercise as tolerated and relax regularly and enjoy meals    Current Outpatient Medications   Medication Sig Dispense Refill    diclofenac (VOLTAREN) 75 MG EC tablet Take 1 tablet by mouth daily as needed for Pain 30 tablet 0    gabapentin (NEURONTIN) 600 MG tablet Take 1 tablet by mouth 2 times daily for 60 days. 60 tablet 1    topiramate (TOPAMAX) 25 MG tablet Take 1 tablet by mouth 2 times daily 60 tablet 1    naloxone (NARCAN) 4 MG/0.1ML LIQD nasal spray 1 spray by Nasal route as needed for Opioid Reversal 1 each 0    amitriptyline (ELAVIL) 25 MG tablet Take 1-2 tablets by mouth nightly 180 tablet 0    montelukast (SINGULAIR) 10 MG tablet       tolterodine (DETROL LA) 4 MG extended release capsule Take 1 capsule by mouth daily      ketoconazole (NIZORAL) 2 % shampoo WASH SCALP THREE TIMES PER WEEK 120 mL 3    FLUoxetine (PROZAC) 40 MG capsule       Prenatal Vit-Fe Fumarate-FA (PNV PRENATAL PLUS MULTIVITAMIN) 27-1 MG TABS       Prenatal Vit-DSS-Fe Cbn-FA (VIRT-CHAN GT) 90-1 MG TABS       omeprazole (PRILOSEC) 40 MG capsule Take 1 capsule by mouth daily      KLOR-CON M20 20 MEQ tablet TAKE TWO TABLETS BY MOUTH TWICE A  tablet 3    VIVELLE-DOT 0.05 MG/24HR APPLY 1 PATCH TO SKIN TWICE WEEKLY 8 patch 11    furosemide (LASIX) 20 MG tablet Take 1 tablet by mouth 3 times daily      therapeutic multivitamin-minerals (THERAGRAN-M) tablet Take 1 tablet by mouth daily       No current facility-administered medications for this visit.       General Goals of current treatment

## 2024-12-30 RX ORDER — CLOPIDOGREL BISULFATE 75 MG/1
75 TABLET ORAL DAILY
COMMUNITY
Start: 2024-10-28

## 2024-12-30 RX ORDER — TAMSULOSIN HYDROCHLORIDE 0.4 MG/1
0.4 CAPSULE ORAL DAILY
Status: ON HOLD | COMMUNITY
Start: 2024-12-19 | End: 2025-01-09

## 2024-12-30 NOTE — PROGRESS NOTES
[facility] PRE-SURGICAL TESTING INSTRUCTIONS                      PRIOR TO PROCEDURE DATE:    1. PLEASE FOLLOW ANY INSTRUCTIONS GIVEN TO YOU PER YOUR SURGEON.      2. Arrange for someone to drive you home and be with you for the first 24 hours after discharge for your safety after your procedure for which you received sedation. Ensure it is someone we can share information with regarding your discharge.     NOTE: At this time ONLY 2 ADULTS may accompany you   One person ENCOURAGED to stay at hospital entire time if outpatient surgery      3. You must contact your surgeon for instructions IF:  You are taking any blood thinners, aspirin, anti-inflammatory or vitamins.  There is a change in your physical condition such as a cold, fever, rash, cuts, sores, or any other infection, especially near your surgical site.    4. Do not drink alcohol the day before or day of your procedure.  Do not use any recreational marijuana at least 24 hours or street drugs (heroin, cocaine) at minimum 5 days prior to your procedure.     5. A Pre-Surgical History and Physical MUST be completed WITHIN 30 DAYS OR LESS prior to your procedure.by your Physician or an Urgent Care        THE DAY OF YOUR PROCEDURE:  1.  Follow instructions for ARRIVAL TIME as DIRECTED BY YOUR SURGEON.     2. Enter the MAIN entrance from [facility] and follow the signs to the free Parking Garage or  Parking (offered free of charge 7 am-5pm).      3. Enter the Main Entrance of the hospital (do not enter from the lower level of the parking garage). Upon entrance, check in with the  at the surgical information desk on your LEFT.   Bring your insurance card and photo ID to register      4. DO NOT EAT ANYTHING 8 hours prior to arrival for surgery.  You may have up to 8 ounces of water 4 hours prior to your arrival for surgery.   NOTE: ALL Gastric, Bariatric & Bowel surgery patients - you MUST follow your surgeon's instructions regarding  anesthesia, such as extreme drowsiness, changes in your vital signs or breathing patterns. Nausea, headache, muscle aches, or sore throat may also occur after anesthesia.  Your nurse will help you manage these potential side effects.    2. For comfort and safety, arrange to have someone at home with you for the first 24 hours after discharge.    3. You and your family will be given written instructions about your diet, activity, dressing care, medications, and return visits.     4. Once at home, should issues with nausea, pain, or bleeding occur, or should you notice any signs of infection, you should call your surgeon.    5. Always clean your hands before and after caring for your wound. Do not let your family touch your surgery site without cleaning their hands.     6. Narcotic pain medications can cause significant constipation.  You may want to add a stool softener to your postoperative medication schedule or speak to your surgeon on how best to manage this SIDE EFFECT.    SPECIAL INSTRUCTIONS     Thank you for allowing us to care for you.  We strive to exceed your expectations in the delivery of care and service provided to you and your family.     If you need to contact the Pre-Admission Testing staff for any reason, please call us at [phone]    Instructions reviewed with patient during preadmission testing phone interview.  Birdie Verduzco RN.12/30/2024 .2:45 PM      ADDITIONAL EDUCATIONAL INFORMATION REVIEWED PER PHONE WITH YOU AND/OR YOUR FAMILY:  No Hibiclens® Bathing Instructions   Yes Antibacterial Soap

## 2024-12-30 NOTE — PROGRESS NOTES
12/30/24 @ 1455 Abnormal labs from [facility] 12/22 faxed to Dr. Salvador's office. Pt confirms will be seeing PCP Dr. Flanagan [phone] on Friday 1/3/25 for PreOp. MD

## 2025-01-08 NOTE — PROGRESS NOTES
1/8/25 @ 3089 I called PCP office   but I left message for them to fax H&P to PAT and I called surgeon office and left message on [facility] voice mail that I can not get a hold of PCP office for H&P would surgeon do DOS  /NR    1/8/25 @ 6901 Luc[facility] from surgeon office called back and surgeon will do DOS  /NR

## 2025-01-09 ENCOUNTER — ANESTHESIA (OUTPATIENT)
Dept: OPERATING ROOM | Age: 70
End: 2025-01-09
Payer: MEDICARE

## 2025-01-09 ENCOUNTER — APPOINTMENT (OUTPATIENT)
Dept: GENERAL RADIOLOGY | Age: 70
End: 2025-01-09
Attending: STUDENT IN AN ORGANIZED HEALTH CARE EDUCATION/TRAINING PROGRAM
Payer: MEDICARE

## 2025-01-09 ENCOUNTER — ANESTHESIA EVENT (OUTPATIENT)
Dept: OPERATING ROOM | Age: 70
End: 2025-01-09
Payer: MEDICARE

## 2025-01-09 ENCOUNTER — HOSPITAL ENCOUNTER (OUTPATIENT)
Age: 70
Setting detail: OUTPATIENT SURGERY
Discharge: HOME | End: 2025-01-09
Attending: STUDENT IN AN ORGANIZED HEALTH CARE EDUCATION/TRAINING PROGRAM | Admitting: STUDENT IN AN ORGANIZED HEALTH CARE EDUCATION/TRAINING PROGRAM
Payer: MEDICARE

## 2025-01-09 VITALS
RESPIRATION RATE: 14 BRPM | OXYGEN SATURATION: 95 % | BODY MASS INDEX: 39.23 KG/M2 | HEIGHT: 61 IN | TEMPERATURE: 97.3 F | SYSTOLIC BLOOD PRESSURE: 109 MMHG | WEIGHT: 207.8 LBS | DIASTOLIC BLOOD PRESSURE: 68 MMHG | HEART RATE: 74 BPM

## 2025-01-09 DIAGNOSIS — N20.1 CALCULUS OF LEFT URETER: ICD-10-CM

## 2025-01-09 LAB
APTT BLD: 28.2 SEC (ref 22.1–36.4)
INR PPP: 1.05 (ref 0.85–1.15)
PROTHROMBIN TIME: 14 SEC (ref 11.9–14.9)

## 2025-01-09 PROCEDURE — 3700000001 HC ADD 15 MINUTES (ANESTHESIA): Performed by: STUDENT IN AN ORGANIZED HEALTH CARE EDUCATION/TRAINING PROGRAM

## 2025-01-09 PROCEDURE — 2720000010 HC SURG SUPPLY STERILE: Performed by: STUDENT IN AN ORGANIZED HEALTH CARE EDUCATION/TRAINING PROGRAM

## 2025-01-09 PROCEDURE — 6360000002 HC RX W HCPCS: Performed by: NURSE ANESTHETIST, CERTIFIED REGISTERED

## 2025-01-09 PROCEDURE — 3600000004 HC SURGERY LEVEL 4 BASE: Performed by: STUDENT IN AN ORGANIZED HEALTH CARE EDUCATION/TRAINING PROGRAM

## 2025-01-09 PROCEDURE — 2500000003 HC RX 250 WO HCPCS: Performed by: NURSE ANESTHETIST, CERTIFIED REGISTERED

## 2025-01-09 PROCEDURE — 2709999900 HC NON-CHARGEABLE SUPPLY: Performed by: STUDENT IN AN ORGANIZED HEALTH CARE EDUCATION/TRAINING PROGRAM

## 2025-01-09 PROCEDURE — C1747 HC ENDOSCOPE, SINGLE, URINARY TRACT: HCPCS | Performed by: STUDENT IN AN ORGANIZED HEALTH CARE EDUCATION/TRAINING PROGRAM

## 2025-01-09 PROCEDURE — C2617 STENT, NON-COR, TEM W/O DEL: HCPCS | Performed by: STUDENT IN AN ORGANIZED HEALTH CARE EDUCATION/TRAINING PROGRAM

## 2025-01-09 PROCEDURE — 7100000000 HC PACU RECOVERY - FIRST 15 MIN: Performed by: STUDENT IN AN ORGANIZED HEALTH CARE EDUCATION/TRAINING PROGRAM

## 2025-01-09 PROCEDURE — 7100000010 HC PHASE II RECOVERY - FIRST 15 MIN: Performed by: STUDENT IN AN ORGANIZED HEALTH CARE EDUCATION/TRAINING PROGRAM

## 2025-01-09 PROCEDURE — 85730 THROMBOPLASTIN TIME PARTIAL: CPT

## 2025-01-09 PROCEDURE — 2500000003 HC RX 250 WO HCPCS: Performed by: STUDENT IN AN ORGANIZED HEALTH CARE EDUCATION/TRAINING PROGRAM

## 2025-01-09 PROCEDURE — C1769 GUIDE WIRE: HCPCS | Performed by: STUDENT IN AN ORGANIZED HEALTH CARE EDUCATION/TRAINING PROGRAM

## 2025-01-09 PROCEDURE — 2580000003 HC RX 258: Performed by: NURSE ANESTHETIST, CERTIFIED REGISTERED

## 2025-01-09 PROCEDURE — 3600000014 HC SURGERY LEVEL 4 ADDTL 15MIN: Performed by: STUDENT IN AN ORGANIZED HEALTH CARE EDUCATION/TRAINING PROGRAM

## 2025-01-09 PROCEDURE — 2580000003 HC RX 258: Performed by: ANESTHESIOLOGY

## 2025-01-09 PROCEDURE — 7100000001 HC PACU RECOVERY - ADDTL 15 MIN: Performed by: STUDENT IN AN ORGANIZED HEALTH CARE EDUCATION/TRAINING PROGRAM

## 2025-01-09 PROCEDURE — 7100000011 HC PHASE II RECOVERY - ADDTL 15 MIN: Performed by: STUDENT IN AN ORGANIZED HEALTH CARE EDUCATION/TRAINING PROGRAM

## 2025-01-09 PROCEDURE — 82365 CALCULUS SPECTROSCOPY: CPT

## 2025-01-09 PROCEDURE — C1758 CATHETER, URETERAL: HCPCS | Performed by: STUDENT IN AN ORGANIZED HEALTH CARE EDUCATION/TRAINING PROGRAM

## 2025-01-09 PROCEDURE — 85610 PROTHROMBIN TIME: CPT

## 2025-01-09 PROCEDURE — 6360000004 HC RX CONTRAST MEDICATION: Performed by: STUDENT IN AN ORGANIZED HEALTH CARE EDUCATION/TRAINING PROGRAM

## 2025-01-09 PROCEDURE — 6370000000 HC RX 637 (ALT 250 FOR IP): Performed by: ANESTHESIOLOGY

## 2025-01-09 PROCEDURE — 3700000000 HC ANESTHESIA ATTENDED CARE: Performed by: STUDENT IN AN ORGANIZED HEALTH CARE EDUCATION/TRAINING PROGRAM

## 2025-01-09 PROCEDURE — 6360000002 HC RX W HCPCS: Performed by: STUDENT IN AN ORGANIZED HEALTH CARE EDUCATION/TRAINING PROGRAM

## 2025-01-09 PROCEDURE — 88300 SURGICAL PATH GROSS: CPT

## 2025-01-09 PROCEDURE — C1894 INTRO/SHEATH, NON-LASER: HCPCS | Performed by: STUDENT IN AN ORGANIZED HEALTH CARE EDUCATION/TRAINING PROGRAM

## 2025-01-09 PROCEDURE — 6360000002 HC RX W HCPCS: Performed by: ANESTHESIOLOGY

## 2025-01-09 DEVICE — URETERAL STENT
Type: IMPLANTABLE DEVICE | Site: URETER | Status: FUNCTIONAL
Brand: POLARIS™ ULTRA

## 2025-01-09 RX ORDER — SODIUM CHLORIDE, SODIUM LACTATE, POTASSIUM CHLORIDE, CALCIUM CHLORIDE 600; 310; 30; 20 MG/100ML; MG/100ML; MG/100ML; MG/100ML
INJECTION, SOLUTION INTRAVENOUS
Status: DISCONTINUED | OUTPATIENT
Start: 2025-01-09 | End: 2025-01-09 | Stop reason: SDUPTHER

## 2025-01-09 RX ORDER — MAGNESIUM HYDROXIDE 1200 MG/15ML
LIQUID ORAL CONTINUOUS PRN
Status: DISCONTINUED | OUTPATIENT
Start: 2025-01-09 | End: 2025-01-09 | Stop reason: HOSPADM

## 2025-01-09 RX ORDER — NALOXONE HYDROCHLORIDE 0.4 MG/ML
INJECTION, SOLUTION INTRAMUSCULAR; INTRAVENOUS; SUBCUTANEOUS PRN
Status: DISCONTINUED | OUTPATIENT
Start: 2025-01-09 | End: 2025-01-09 | Stop reason: HOSPADM

## 2025-01-09 RX ORDER — LORAZEPAM 2 MG/ML
0.5 INJECTION INTRAMUSCULAR
Status: DISCONTINUED | OUTPATIENT
Start: 2025-01-09 | End: 2025-01-09 | Stop reason: HOSPADM

## 2025-01-09 RX ORDER — MORPHINE SULFATE 15 MG/1
15 TABLET, FILM COATED, EXTENDED RELEASE ORAL
Status: COMPLETED | OUTPATIENT
Start: 2025-01-09 | End: 2025-01-09

## 2025-01-09 RX ORDER — SODIUM CHLORIDE 9 MG/ML
INJECTION, SOLUTION INTRAVENOUS PRN
Status: DISCONTINUED | OUTPATIENT
Start: 2025-01-09 | End: 2025-01-09 | Stop reason: HOSPADM

## 2025-01-09 RX ORDER — LIDOCAINE HYDROCHLORIDE 20 MG/ML
INJECTION, SOLUTION EPIDURAL; INFILTRATION; INTRACAUDAL; PERINEURAL
Status: DISCONTINUED | OUTPATIENT
Start: 2025-01-09 | End: 2025-01-09 | Stop reason: SDUPTHER

## 2025-01-09 RX ORDER — MORPHINE SULFATE 30 MG/1
30 TABLET ORAL
Status: DISCONTINUED | OUTPATIENT
Start: 2025-01-09 | End: 2025-01-09 | Stop reason: DRUGHIGH

## 2025-01-09 RX ORDER — FENTANYL CITRATE 50 UG/ML
INJECTION, SOLUTION INTRAMUSCULAR; INTRAVENOUS
Status: DISCONTINUED | OUTPATIENT
Start: 2025-01-09 | End: 2025-01-09 | Stop reason: SDUPTHER

## 2025-01-09 RX ORDER — FENTANYL CITRATE 50 UG/ML
25 INJECTION, SOLUTION INTRAMUSCULAR; INTRAVENOUS EVERY 5 MIN PRN
Status: DISCONTINUED | OUTPATIENT
Start: 2025-01-09 | End: 2025-01-09 | Stop reason: HOSPADM

## 2025-01-09 RX ORDER — IOPAMIDOL 612 MG/ML
INJECTION, SOLUTION INTRAVASCULAR PRN
Status: DISCONTINUED | OUTPATIENT
Start: 2025-01-09 | End: 2025-01-09 | Stop reason: HOSPADM

## 2025-01-09 RX ORDER — OXYCODONE HYDROCHLORIDE 5 MG/1
5 TABLET ORAL EVERY 6 HOURS PRN
Qty: 12 TABLET | Refills: 0 | Status: SHIPPED | OUTPATIENT
Start: 2025-01-09 | End: 2025-01-12

## 2025-01-09 RX ORDER — OXYBUTYNIN CHLORIDE 5 MG/1
5 TABLET ORAL 3 TIMES DAILY PRN
Qty: 15 TABLET | Refills: 0 | Status: SHIPPED | OUTPATIENT
Start: 2025-01-09

## 2025-01-09 RX ORDER — SODIUM CHLORIDE, SODIUM LACTATE, POTASSIUM CHLORIDE, CALCIUM CHLORIDE 600; 310; 30; 20 MG/100ML; MG/100ML; MG/100ML; MG/100ML
INJECTION, SOLUTION INTRAVENOUS CONTINUOUS
Status: DISCONTINUED | OUTPATIENT
Start: 2025-01-09 | End: 2025-01-09 | Stop reason: HOSPADM

## 2025-01-09 RX ORDER — PROPOFOL 10 MG/ML
INJECTION, EMULSION INTRAVENOUS
Status: DISCONTINUED | OUTPATIENT
Start: 2025-01-09 | End: 2025-01-09 | Stop reason: SDUPTHER

## 2025-01-09 RX ORDER — LABETALOL HYDROCHLORIDE 5 MG/ML
10 INJECTION, SOLUTION INTRAVENOUS
Status: DISCONTINUED | OUTPATIENT
Start: 2025-01-09 | End: 2025-01-09 | Stop reason: HOSPADM

## 2025-01-09 RX ORDER — CIPROFLOXACIN 2 MG/ML
400 INJECTION, SOLUTION INTRAVENOUS ONCE
Status: COMPLETED | OUTPATIENT
Start: 2025-01-09 | End: 2025-01-09

## 2025-01-09 RX ORDER — PROCHLORPERAZINE EDISYLATE 5 MG/ML
5 INJECTION INTRAMUSCULAR; INTRAVENOUS
Status: DISCONTINUED | OUTPATIENT
Start: 2025-01-09 | End: 2025-01-09 | Stop reason: HOSPADM

## 2025-01-09 RX ORDER — SULFAMETHOXAZOLE AND TRIMETHOPRIM 800; 160 MG/1; MG/1
1 TABLET ORAL 2 TIMES DAILY
Qty: 6 TABLET | Refills: 0 | Status: SHIPPED | OUTPATIENT
Start: 2025-01-09 | End: 2025-01-12

## 2025-01-09 RX ORDER — MEPERIDINE HYDROCHLORIDE 25 MG/ML
12.5 INJECTION INTRAMUSCULAR; INTRAVENOUS; SUBCUTANEOUS EVERY 5 MIN PRN
Status: DISCONTINUED | OUTPATIENT
Start: 2025-01-09 | End: 2025-01-09 | Stop reason: HOSPADM

## 2025-01-09 RX ORDER — TAMSULOSIN HYDROCHLORIDE 0.4 MG/1
0.4 CAPSULE ORAL DAILY
Qty: 10 CAPSULE | Refills: 0 | Status: SHIPPED | OUTPATIENT
Start: 2025-01-09

## 2025-01-09 RX ORDER — ONDANSETRON 2 MG/ML
INJECTION INTRAMUSCULAR; INTRAVENOUS
Status: DISCONTINUED | OUTPATIENT
Start: 2025-01-09 | End: 2025-01-09 | Stop reason: SDUPTHER

## 2025-01-09 RX ORDER — HYDROMORPHONE HYDROCHLORIDE 1 MG/ML
0.5 INJECTION, SOLUTION INTRAMUSCULAR; INTRAVENOUS; SUBCUTANEOUS EVERY 5 MIN PRN
Status: DISCONTINUED | OUTPATIENT
Start: 2025-01-09 | End: 2025-01-09 | Stop reason: HOSPADM

## 2025-01-09 RX ORDER — OXYCODONE HYDROCHLORIDE 5 MG/1
5 TABLET ORAL
Status: DISCONTINUED | OUTPATIENT
Start: 2025-01-09 | End: 2025-01-09 | Stop reason: HOSPADM

## 2025-01-09 RX ORDER — DIPHENHYDRAMINE HYDROCHLORIDE 50 MG/ML
12.5 INJECTION INTRAMUSCULAR; INTRAVENOUS
Status: DISCONTINUED | OUTPATIENT
Start: 2025-01-09 | End: 2025-01-09 | Stop reason: HOSPADM

## 2025-01-09 RX ORDER — ONDANSETRON 2 MG/ML
4 INJECTION INTRAMUSCULAR; INTRAVENOUS
Status: DISCONTINUED | OUTPATIENT
Start: 2025-01-09 | End: 2025-01-09 | Stop reason: HOSPADM

## 2025-01-09 RX ORDER — SODIUM CHLORIDE 0.9 % (FLUSH) 0.9 %
5-40 SYRINGE (ML) INJECTION PRN
Status: DISCONTINUED | OUTPATIENT
Start: 2025-01-09 | End: 2025-01-09 | Stop reason: HOSPADM

## 2025-01-09 RX ORDER — DEXAMETHASONE SODIUM PHOSPHATE 4 MG/ML
INJECTION, SOLUTION INTRA-ARTICULAR; INTRALESIONAL; INTRAMUSCULAR; INTRAVENOUS; SOFT TISSUE
Status: DISCONTINUED | OUTPATIENT
Start: 2025-01-09 | End: 2025-01-09 | Stop reason: SDUPTHER

## 2025-01-09 RX ORDER — SODIUM CHLORIDE 0.9 % (FLUSH) 0.9 %
5-40 SYRINGE (ML) INJECTION EVERY 12 HOURS SCHEDULED
Status: DISCONTINUED | OUTPATIENT
Start: 2025-01-09 | End: 2025-01-09 | Stop reason: HOSPADM

## 2025-01-09 RX ORDER — IPRATROPIUM BROMIDE AND ALBUTEROL SULFATE 2.5; .5 MG/3ML; MG/3ML
1 SOLUTION RESPIRATORY (INHALATION)
Status: DISCONTINUED | OUTPATIENT
Start: 2025-01-09 | End: 2025-01-09 | Stop reason: HOSPADM

## 2025-01-09 RX ADMIN — FENTANYL CITRATE 50 MCG: 50 INJECTION, SOLUTION INTRAMUSCULAR; INTRAVENOUS at 09:56

## 2025-01-09 RX ADMIN — DEXAMETHASONE SODIUM PHOSPHATE 4 MG: 4 INJECTION INTRA-ARTICULAR; INTRALESIONAL; INTRAMUSCULAR; INTRAVENOUS; SOFT TISSUE at 10:16

## 2025-01-09 RX ADMIN — MORPHINE SULFATE 15 MG: 15 TABLET, FILM COATED, EXTENDED RELEASE ORAL at 12:39

## 2025-01-09 RX ADMIN — ONDANSETRON 4 MG: 2 INJECTION INTRAMUSCULAR; INTRAVENOUS at 10:16

## 2025-01-09 RX ADMIN — LIDOCAINE HYDROCHLORIDE 100 MG: 20 INJECTION, SOLUTION EPIDURAL; INFILTRATION; INTRACAUDAL; PERINEURAL at 09:45

## 2025-01-09 RX ADMIN — SODIUM CHLORIDE, POTASSIUM CHLORIDE, SODIUM LACTATE AND CALCIUM CHLORIDE: 600; 310; 30; 20 INJECTION, SOLUTION INTRAVENOUS at 08:04

## 2025-01-09 RX ADMIN — HYDROMORPHONE HYDROCHLORIDE 0.5 MG: 1 INJECTION, SOLUTION INTRAMUSCULAR; INTRAVENOUS; SUBCUTANEOUS at 11:45

## 2025-01-09 RX ADMIN — PROPOFOL 100 MG: 10 INJECTION, EMULSION INTRAVENOUS at 09:45

## 2025-01-09 RX ADMIN — HYDROMORPHONE HYDROCHLORIDE 0.5 MG: 1 INJECTION, SOLUTION INTRAMUSCULAR; INTRAVENOUS; SUBCUTANEOUS at 11:30

## 2025-01-09 RX ADMIN — PROPOFOL 50 MG: 10 INJECTION, EMULSION INTRAVENOUS at 09:47

## 2025-01-09 RX ADMIN — SODIUM CHLORIDE, SODIUM LACTATE, POTASSIUM CHLORIDE, AND CALCIUM CHLORIDE: .6; .31; .03; .02 INJECTION, SOLUTION INTRAVENOUS at 09:36

## 2025-01-09 RX ADMIN — FENTANYL CITRATE 50 MCG: 50 INJECTION, SOLUTION INTRAMUSCULAR; INTRAVENOUS at 10:32

## 2025-01-09 RX ADMIN — DEXMEDETOMIDINE HYDROCHLORIDE 8 MCG: 100 INJECTION, SOLUTION INTRAVENOUS at 10:41

## 2025-01-09 RX ADMIN — CIPROFLOXACIN 400 MG: 400 INJECTION, SOLUTION INTRAVENOUS at 09:53

## 2025-01-09 ASSESSMENT — PAIN SCALES - GENERAL
PAINLEVEL_OUTOF10: 0
PAINLEVEL_OUTOF10: 7
PAINLEVEL_OUTOF10: 7

## 2025-01-09 ASSESSMENT — PAIN - FUNCTIONAL ASSESSMENT: PAIN_FUNCTIONAL_ASSESSMENT: 0-10

## 2025-01-09 NOTE — OP NOTE
Operative Note      Patient: Isabella Harris  YOB: 1955  MRN: [id number]    Date of Procedure: 1/9/2025    Pre-Op Diagnosis Codes:      * Calculus of left ureter [N20.1]    Post-Op Diagnosis: Same       Procedure(s):  CYSTOSCOPY, LEFT FLEXIBLE URETEROSCOPY, HOLMIUM LASER LITHOTRIPSY WITH STONE MANIPULATION WITH CALYXO (CVAC) SCOPE, LEFT STENT EXCHANGE, LEFT RETROGRADE PYELOGRAM    Surgeon(s):  Armando Salvador MD    Assistant:   * No surgical staff found *    Anesthesia: General    Estimated Blood Loss (mL): Minimal    Complications: None    Specimens:   ID Type Source Tests Collected by Time Destination   A : LEFT KIDNEY STONE FOR ANALYSIS Tissue Tissue SURGICAL PATHOLOGY Armando Salvador MD 1/9/2025 1048        Implants:  * No implants in log *      Drains:   Ureteral Drain/Stent 01/09/25 Left Ureter (Active)       Indications:  70 y.o. female who first presented with a 7 mm L UPJ stone.  She underwent L stent placement with Dr. Laurent on 12/3/24.  She presents today for definitive stone management.  She agreed to undergo the above named procedure after discussion of the alternatives, risks and benefits.  Informed consent was obtained.      Findings:  1.  The urethra was visually normal with no stricture or mucosal abnormality. Cystoscopy revealed no tumors, stones or other mucosal abnormalities.  2.  Left ureteroscopy confirmed the presence of the stone seen on preoperative imaging to have moved into the mid ureter proximal to the pelvic brim.  The stone could not be targeted with a semirigid scope.  3.  Left retrograde pyelogram revealed a minimally dilated system with identification of the stone seen on pre-op imaging. There were no other filling defects in the ureter, renal pelvis, or calyces.   4.  At the conclusion of the case, I would estimate that the visual stone clearance was 100%.    Detailed Description of Procedure:  The patient was taken to the operating room and placed supine on the

## 2025-01-09 NOTE — DISCHARGE INSTRUCTIONS
Home Care After Kidney Stone Surgery  The following instructions will help you care for yourself, or be cared for upon your return home today.   These are guidelines for your care right after surgery only.     Diet  Drink plenty of liquids and eat light meals today.   Start your regular diet tomorrow.    Medications  Do NOT resume your clopidogrel (Plavix) until after your ureteral stent is removed.  If your PCP/cardiologist would prefer that you take something this next week as opposed to being off of this type of medication completely, then you can take baby aspirin (81 mg) daily starting tomorrow.  You may resume all other home medications    Activity  Start normal activities in twenty-four (24) hours.    Wound Care and Hygiene  No restrictions, start normal routine.    Anesthesia Precautions & Expectations  After anesthesia, rest for 24 hours.   Do not drive, drink alcoholic beverages or make any important decisions during this time.  General anesthesia may cause a sore throat, jaw discomfort or muscle aches.   These symptoms can last for one or two days.     What to Expect after Surgery  Mild pain with voiding.  Frequency or urgency.  Bladder cramps.  Minimal bleeding with voiding.    Call your Doctor  Passing clots in urine preventing bladder emptying  Severe pain not controlled by oral medication  Temperature above 101.5 degrees  Inability to urinate within eight (8) hours after surgery    After Stent Placement  It is common to have blood tinged urine for 3-5 days, and potentially the entire time the stent is in place.  It is common to have pain in your side and in your back when you urinate for 3-5 days.  It is common to have urgency with urination.  This is a temporary stent and will need to be pulled at the next appointment.    Follow up Appointment  You will be scheduled for stent removal in 7-10 days and an ultrasound of your kidneys in 6 weeks.      [facility] AMBULATORY PROCEDURE DISCHARGE

## 2025-01-09 NOTE — BRIEF OP NOTE
Brief Postoperative Note      Patient: Isabella Harris  YOB: 1955  MRN: [id number]    Date of Procedure: 1/9/2025    Pre-Op Diagnosis Codes:      * Calculus of left ureter [N20.1]    Post-Op Diagnosis: Same       Procedure(s):  CYSTOSCOPY, LEFT FLEXIBLE URETEROSCOPY, HOLMIUM LASER STONE MANIPULATION WITH CALYXO, LEFT STENT EXCHANGE    Surgeon(s):  Armando Salvador MD    Assistant:  * No surgical staff found *    Anesthesia: General    Estimated Blood Loss (mL): Minimal    Complications: None    Specimens:   ID Type Source Tests Collected by Time Destination   A : LEFT KIDNEY STONE FOR ANALYSIS Tissue Tissue SURGICAL PATHOLOGY Armando Salvador MD 1/9/2025 1048        Implants:  * No implants in log *      Drains:   Ureteral Drain/Stent 01/09/25 Left Ureter (Active)       Findings:  Infection Present At Time Of Surgery (PATOS) (choose all levels that have infection present):  No infection present  Other Findings: difficult stone location    Electronically signed by Armando Salvador MD on 1/9/2025 at 11:15 AM

## 2025-01-09 NOTE — ANESTHESIA POSTPROCEDURE EVALUATION
Department of Anesthesiology  Postprocedure Note    Patient: Isabella Hraris  MRN: [id number]  YOB: 1955  Date of evaluation: 1/9/2025    Procedure Summary       Date: 01/09/25 Room / Location: [place] [address] / [facility]    Anesthesia Start: 0936 Anesthesia Stop: 1122    Procedure: CYSTOSCOPY, LEFT FLEXIBLE URETEROSCOPY, HOLMIUM LASER STONE MANIPULATION WITH CALYXO, LEFT STENT EXCHANGE (Left: Ureter) Diagnosis:       Calculus of left ureter      (Calculus of left ureter [N20.1])    Surgeons: Armando Salvador MD Responsible Provider: Marco Davis MD    Anesthesia Type: general ASA Status: 2            Anesthesia Type: No value filed.    Manoj Phase I: Manoj Score: 8    Manoj Phase II:      Anesthesia Post Evaluation    Patient location during evaluation: PACU  Patient participation: complete - patient participated  Level of consciousness: awake  Airway patency: patent  Nausea & Vomiting: no nausea and no vomiting  Cardiovascular status: blood pressure returned to baseline and hemodynamically stable  Respiratory status: acceptable  Hydration status: euvolemic  Multimodal analgesia pain management approach  Pain management: adequate    No notable events documented.

## 2025-01-09 NOTE — INTERVAL H&P NOTE
Update History & Physical    The patient's History and Physical of January 3, 2025 was reviewed with the patient and I examined the patient. There was no change. The surgical site was confirmed by the patient and me.     Plan: The risks, benefits, expected outcome, and alternative to the recommended procedure have been discussed with the patient. Patient understands and wants to proceed with the procedure.     Electronically signed by Armando Salvador MD on 1/9/2025 at 7:36 AM

## 2025-01-09 NOTE — PROGRESS NOTES
Brought to PACU from OR. Easily arousable to name. Report received from CRNA and OR RN. Placed on pacu monitors.. Denies nausea/pain at this time.

## 2025-01-09 NOTE — PROGRESS NOTES
PACU Transfer to [facility]    Vitals:    01/09/25 1228   BP: 130/64   Pulse: 75   Resp: 15   Temp: 97.5   SpO2: 97%         Intake/Output Summary (Last 24 hours) at 1/9/2025 1255  Last data filed at 1/9/2025 1118  Gross per 24 hour   Intake 1100 ml   Output --   Net 1100 ml       Pain assessment:  present - adequately treated  Pain Level: 7    Patient transferred to care of SANDY RN.    1/9/2025 12:55 PM

## 2025-01-09 NOTE — ANESTHESIA PRE PROCEDURE
Department of Anesthesiology  Preprocedure Note       Name:  Isabella Harris   Age:  70 y.o.  :  1955                                          MRN:  [id number]         Date:  2025      Surgeon: Surgeon(s):  Armando Salvador MD    Procedure: Procedure(s):  CYSTOSCOPY, LEFT FLEXIBLE URETEROSCOPY, HOLMIUM LASER STONE MANIPULATION WITH CALYXO, POSSIBLE LEFT STENT EXCHANGE    Medications prior to admission:   Prior to Admission medications    Medication Sig Start Date End Date Taking? Authorizing Provider   tamsulosin (FLOMAX) 0.4 MG capsule Take 1 capsule by mouth daily 24  Yes Gio Purdy MD   clopidogrel (PLAVIX) 75 MG tablet Take 1 tablet by mouth daily 10/28/24  Yes Gio Purdy MD   amitriptyline (ELAVIL) 25 MG tablet Take 1-2 tablets by mouth nightly 24  Yes Rodrigo Rowe MD   gabapentin (NEURONTIN) 600 MG tablet Take 1 tablet by mouth 2 times daily for 60 days. 24 Yes Rodrigo Rowe MD   topiramate (TOPAMAX) 25 MG tablet Take 1 tablet by mouth 2 times daily 24  Yes Rodrigo Rowe MD   morphine (MS CONTIN) 15 MG extended release tablet Take 1 tablet by mouth 3 times daily for 28 days. 24 Yes Rodrigo Rowe MD   montelukast (SINGULAIR) 10 MG tablet Take 1 tablet by mouth nightly 24  Yes Gio Purdy MD   ketoconazole (NIZORAL) 2 % shampoo WASH SCALP THREE TIMES PER WEEK  Patient taking differently: Apply 1 each topically as needed for Itching WASH SCALP THREE TIMES PER WEEK 21  Yes Monster Stack MD   FLUoxetine HCl 60 MG TABS Take 60 mg by mouth daily 17  Yes Gio Purdy MD   omeprazole (PRILOSEC) 40 MG capsule Take 1 capsule by mouth daily   Yes Gio Purdy MD   KLOR-CON M20 20 MEQ tablet TAKE TWO TABLETS BY MOUTH TWICE A DAY  Patient taking differently: Take 1 tablet by mouth 3 times daily 13  Yes Paige Hugo, APRN - CNP   VIVELLE-DOT 0.05 MG/24HR APPLY 1 PATCH TO SKIN

## 2025-01-11 DIAGNOSIS — M79.7 FIBROMYALGIA: ICD-10-CM

## 2025-01-11 DIAGNOSIS — M17.0 PRIMARY OSTEOARTHRITIS OF BOTH KNEES: ICD-10-CM

## 2025-01-11 DIAGNOSIS — G89.4 CHRONIC PAIN SYNDROME: ICD-10-CM

## 2025-01-13 ENCOUNTER — OFFICE VISIT (OUTPATIENT)
Dept: PAIN MANAGEMENT | Age: 70
End: 2025-01-13
Payer: MEDICARE

## 2025-01-13 VITALS
OXYGEN SATURATION: 95 % | WEIGHT: 217 LBS | DIASTOLIC BLOOD PRESSURE: 60 MMHG | HEART RATE: 77 BPM | SYSTOLIC BLOOD PRESSURE: 123 MMHG | BODY MASS INDEX: 41 KG/M2

## 2025-01-13 DIAGNOSIS — M79.2 NEUROPATHIC PAIN: ICD-10-CM

## 2025-01-13 DIAGNOSIS — G89.4 CHRONIC PAIN SYNDROME: ICD-10-CM

## 2025-01-13 DIAGNOSIS — M51.372 DEGENERATION OF INTERVERTEBRAL DISC OF LUMBOSACRAL REGION WITH DISCOGENIC BACK PAIN AND LOWER EXTREMITY PAIN: ICD-10-CM

## 2025-01-13 DIAGNOSIS — K43.2 RECURRENT INCISIONAL HERNIA WITH COMPLICATION: ICD-10-CM

## 2025-01-13 DIAGNOSIS — M79.7 FIBROMYALGIA: ICD-10-CM

## 2025-01-13 DIAGNOSIS — G43.709 CHRONIC MIGRAINE WITHOUT AURA WITHOUT STATUS MIGRAINOSUS, NOT INTRACTABLE: ICD-10-CM

## 2025-01-13 DIAGNOSIS — M17.0 PRIMARY OSTEOARTHRITIS OF BOTH KNEES: ICD-10-CM

## 2025-01-13 DIAGNOSIS — L03.311 ABDOMINAL WALL CELLULITIS: ICD-10-CM

## 2025-01-13 PROCEDURE — 1123F ACP DISCUSS/DSCN MKR DOCD: CPT | Performed by: INTERNAL MEDICINE

## 2025-01-13 PROCEDURE — 1159F MED LIST DOCD IN RCRD: CPT | Performed by: INTERNAL MEDICINE

## 2025-01-13 PROCEDURE — 99213 OFFICE O/P EST LOW 20 MIN: CPT | Performed by: INTERNAL MEDICINE

## 2025-01-13 RX ORDER — DICLOFENAC SODIUM 75 MG/1
75 TABLET, DELAYED RELEASE ORAL DAILY PRN
Qty: 30 TABLET | Refills: 0 | OUTPATIENT
Start: 2025-01-13

## 2025-01-13 RX ORDER — DICLOFENAC SODIUM 75 MG/1
75 TABLET, DELAYED RELEASE ORAL DAILY PRN
Qty: 30 TABLET | Refills: 0 | Status: SHIPPED | OUTPATIENT
Start: 2025-01-13

## 2025-01-13 RX ORDER — TOPIRAMATE 25 MG/1
25 TABLET, FILM COATED ORAL 2 TIMES DAILY
Qty: 60 TABLET | Refills: 1 | Status: SHIPPED | OUTPATIENT
Start: 2025-01-13

## 2025-01-13 RX ORDER — MORPHINE SULFATE 15 MG/1
15 TABLET, FILM COATED, EXTENDED RELEASE ORAL 3 TIMES DAILY
Qty: 84 TABLET | Refills: 0 | Status: SHIPPED | OUTPATIENT
Start: 2025-01-13 | End: 2025-02-10

## 2025-01-13 RX ORDER — GABAPENTIN 600 MG/1
600 TABLET ORAL 2 TIMES DAILY
Qty: 60 TABLET | Refills: 1 | Status: SHIPPED | OUTPATIENT
Start: 2025-01-13 | End: 2025-03-14

## 2025-01-13 NOTE — PROGRESS NOTES
120 mL 3    FLUoxetine HCl 60 MG TABS Take 60 mg by mouth daily      omeprazole (PRILOSEC) 40 MG capsule Take 1 capsule by mouth daily      KLOR-CON M20 20 MEQ tablet TAKE TWO TABLETS BY MOUTH TWICE A DAY (Patient taking differently: Take 1 tablet by mouth 3 times daily) 360 tablet 3    VIVELLE-DOT 0.05 MG/24HR APPLY 1 PATCH TO SKIN TWICE WEEKLY (Patient taking differently: Place 1 patch onto the skin Twice a Week) 8 patch 11    furosemide (LASIX) 20 MG tablet Take 1 tablet by mouth 3 times daily      therapeutic multivitamin-minerals (THERAGRAN-M) tablet Take 1 tablet by mouth daily       No current facility-administered medications for this visit.       General Goals of current treatment regimen include improvement in pain, restoration of functioning- with focus on improvement in physical performance, general activity, work or disability,emotional distress, health care utilization and  decreased medication consumption.   Will continue to monitor progress towards achieving/maintaining therapeutic goals with special emphasis on  1. -Improvement in perceived interfernce  of pain with ADL's. YES  -Ability to do home exercises independently. YES  -Ability to do household chores, indoor work and social and leisure activities. YES  -Improve psychosocial and physical functioning.YES  -Ability to do outside chores/ yard work YES    She was advised against drinking alcohol with the narcotic pain medicines, advised against driving or handling machinery while adjusting the dose of medicines or if having cognitive  issues related to the current medications.Risk of overdose and death, if medicines not taken as prescribed, were also discussed. If the patient develops new symptoms or if the symptoms worsen, the patient should call the office.    Thank you for allowing me to participate in the care of this patient.      Cc: Kannan Jeffers MD

## 2025-01-14 LAB
APPEARANCE STONE: NORMAL
COMPN STONE: NORMAL
SPECIMEN WT: 47 MG

## 2025-02-10 ENCOUNTER — OFFICE VISIT (OUTPATIENT)
Dept: PAIN MANAGEMENT | Age: 70
End: 2025-02-10
Payer: MEDICARE

## 2025-02-10 VITALS
OXYGEN SATURATION: 96 % | WEIGHT: 213 LBS | HEART RATE: 80 BPM | BODY MASS INDEX: 40.25 KG/M2 | DIASTOLIC BLOOD PRESSURE: 94 MMHG | SYSTOLIC BLOOD PRESSURE: 155 MMHG

## 2025-02-10 DIAGNOSIS — G43.709 CHRONIC MIGRAINE WITHOUT AURA WITHOUT STATUS MIGRAINOSUS, NOT INTRACTABLE: ICD-10-CM

## 2025-02-10 DIAGNOSIS — K43.2 RECURRENT INCISIONAL HERNIA WITH COMPLICATION: ICD-10-CM

## 2025-02-10 DIAGNOSIS — G89.4 CHRONIC PAIN SYNDROME: ICD-10-CM

## 2025-02-10 DIAGNOSIS — M79.7 FIBROMYALGIA: ICD-10-CM

## 2025-02-10 DIAGNOSIS — L03.311 ABDOMINAL WALL CELLULITIS: ICD-10-CM

## 2025-02-10 DIAGNOSIS — M17.0 PRIMARY OSTEOARTHRITIS OF BOTH KNEES: ICD-10-CM

## 2025-02-10 DIAGNOSIS — M51.372 DEGENERATION OF INTERVERTEBRAL DISC OF LUMBOSACRAL REGION WITH DISCOGENIC BACK PAIN AND LOWER EXTREMITY PAIN: ICD-10-CM

## 2025-02-10 DIAGNOSIS — F51.01 PRIMARY INSOMNIA: ICD-10-CM

## 2025-02-10 DIAGNOSIS — M79.2 NEUROPATHIC PAIN: ICD-10-CM

## 2025-02-10 DIAGNOSIS — Z51.81 ENCOUNTER FOR THERAPEUTIC DRUG MONITORING: ICD-10-CM

## 2025-02-10 DIAGNOSIS — F39 MOOD DISORDER: ICD-10-CM

## 2025-02-10 PROCEDURE — 1123F ACP DISCUSS/DSCN MKR DOCD: CPT | Performed by: INTERNAL MEDICINE

## 2025-02-10 PROCEDURE — 99214 OFFICE O/P EST MOD 30 MIN: CPT | Performed by: INTERNAL MEDICINE

## 2025-02-10 PROCEDURE — 1159F MED LIST DOCD IN RCRD: CPT | Performed by: INTERNAL MEDICINE

## 2025-02-10 RX ORDER — DICLOFENAC SODIUM 75 MG/1
75 TABLET, DELAYED RELEASE ORAL DAILY PRN
Qty: 30 TABLET | Refills: 0 | Status: SHIPPED | OUTPATIENT
Start: 2025-02-10

## 2025-02-10 RX ORDER — GABAPENTIN 600 MG/1
600 TABLET ORAL 2 TIMES DAILY
Qty: 60 TABLET | Refills: 1 | Status: SHIPPED | OUTPATIENT
Start: 2025-02-10 | End: 2025-04-11

## 2025-02-10 RX ORDER — TOPIRAMATE 25 MG/1
25 TABLET, FILM COATED ORAL 2 TIMES DAILY
Qty: 60 TABLET | Refills: 1 | Status: SHIPPED | OUTPATIENT
Start: 2025-02-10

## 2025-02-10 RX ORDER — MORPHINE SULFATE 15 MG/1
15 TABLET, FILM COATED, EXTENDED RELEASE ORAL 3 TIMES DAILY
Qty: 84 TABLET | Refills: 0 | Status: SHIPPED | OUTPATIENT
Start: 2025-02-10 | End: 2025-03-10

## 2025-02-10 NOTE — PROGRESS NOTES
Isabella Harris  1955  [id number]    HISTORY OF PRESENT ILLNESS:  Ms. Harris is a 70 y.o. female returns for a follow up visit for multiple medical problems.  Her  presenting problems are   1. Chronic pain syndrome    2. Neuropathic pain    3. Mood disorder (HCC)    4. Degeneration of intervertebral disc of lumbosacral region with discogenic back pain and lower extremity pain    5. Fibromyalgia    6. Abdominal wall cellulitis    7. Encounter for therapeutic drug monitoring    8. Primary osteoarthritis of both knees    9. Recurrent incisional hernia with complication    10. Chronic migraine without aura without status migrainosus, not intractable    11. Primary insomnia    .    As per information/history obtained from the PADT(patient assessment and documentation tool) -  She complains of pain in the neck and lower back with radiation to the shoulders Bilateral, hands Bilateral, knees Bilateral, lower leg Bilateral, ankles Bilateral, and feet Bilateral She rates the pain 6/10 and describes it as sharp.  Pain is made worse by: movement, walking, standing.  Current treatment regimen has helped relieve about 50% of the pain.  She denies side effects from the current pain regimen.   Patient reports that since last follow up visit the physical functioning is better, family/social relationships are unchanged, mood is better sleep patterns are unchanged.  Ms. Harris states that since starting the treatment with the current regimen the  overall functioning  in the above aspects is  better,Patient denies neurological bowel or bladder. Patient denies misusing/abusing her narcotic pain medications or using any illegal drugs.  There are No indicators for possible drug abuse, addiction or diversion problems.     Upon obtaining the medical history from Ms. Harris regarding today's office visit for her presenting problems, patient states she has been doing fair, she is managing with the medications. Ms. Harris

## 2025-03-10 ENCOUNTER — OFFICE VISIT (OUTPATIENT)
Dept: PAIN MANAGEMENT | Age: 70
End: 2025-03-10
Payer: MEDICARE

## 2025-03-10 VITALS
BODY MASS INDEX: 40.06 KG/M2 | DIASTOLIC BLOOD PRESSURE: 64 MMHG | OXYGEN SATURATION: 94 % | WEIGHT: 212 LBS | SYSTOLIC BLOOD PRESSURE: 105 MMHG | HEART RATE: 71 BPM

## 2025-03-10 DIAGNOSIS — M79.2 NEUROPATHIC PAIN: ICD-10-CM

## 2025-03-10 DIAGNOSIS — M17.0 PRIMARY OSTEOARTHRITIS OF BOTH KNEES: ICD-10-CM

## 2025-03-10 DIAGNOSIS — M51.372 DEGENERATION OF INTERVERTEBRAL DISC OF LUMBOSACRAL REGION WITH DISCOGENIC BACK PAIN AND LOWER EXTREMITY PAIN: ICD-10-CM

## 2025-03-10 DIAGNOSIS — M79.7 FIBROMYALGIA: ICD-10-CM

## 2025-03-10 DIAGNOSIS — K43.2 RECURRENT INCISIONAL HERNIA WITH COMPLICATION: ICD-10-CM

## 2025-03-10 DIAGNOSIS — L03.311 ABDOMINAL WALL CELLULITIS: ICD-10-CM

## 2025-03-10 DIAGNOSIS — G89.4 CHRONIC PAIN SYNDROME: ICD-10-CM

## 2025-03-10 PROCEDURE — 1123F ACP DISCUSS/DSCN MKR DOCD: CPT | Performed by: INTERNAL MEDICINE

## 2025-03-10 PROCEDURE — 99213 OFFICE O/P EST LOW 20 MIN: CPT | Performed by: INTERNAL MEDICINE

## 2025-03-10 PROCEDURE — 1159F MED LIST DOCD IN RCRD: CPT | Performed by: INTERNAL MEDICINE

## 2025-03-10 RX ORDER — GABAPENTIN 600 MG/1
600 TABLET ORAL 2 TIMES DAILY
Qty: 60 TABLET | Refills: 1 | Status: SHIPPED | OUTPATIENT
Start: 2025-03-10 | End: 2025-05-09

## 2025-03-10 RX ORDER — TOPIRAMATE 25 MG/1
25 TABLET, FILM COATED ORAL 2 TIMES DAILY
Qty: 60 TABLET | Refills: 1 | Status: SHIPPED | OUTPATIENT
Start: 2025-03-10

## 2025-03-10 RX ORDER — MORPHINE SULFATE 15 MG/1
15 TABLET, FILM COATED, EXTENDED RELEASE ORAL 3 TIMES DAILY
Qty: 84 TABLET | Refills: 0 | Status: SHIPPED | OUTPATIENT
Start: 2025-03-10 | End: 2025-04-07

## 2025-03-10 RX ORDER — DICLOFENAC SODIUM 75 MG/1
75 TABLET, DELAYED RELEASE ORAL DAILY PRN
Qty: 30 TABLET | Refills: 0 | Status: SHIPPED | OUTPATIENT
Start: 2025-03-10

## 2025-03-10 NOTE — PROGRESS NOTES
Isabella Harris  1955  [id number]      HISTORY OF PRESENT ILLNESS:  Ms. Harris is a 70 y.o. female returns for a follow up visit for pain management  She has a diagnosis of   1. Chronic pain syndrome    2. Degeneration of intervertebral disc of lumbosacral region with discogenic back pain and lower extremity pain    3. Chronic migraine without aura without status migrainosus, not intractable    4. Neuropathic pain    5. Fibromyalgia    6. Primary osteoarthritis of both knees    7. Primary insomnia    8. Mood disorder    9. Abdominal wall cellulitis    10. Recurrent incisional hernia with complication    .      As per information/history obtained from the PADT(patient assessment and documentation tool) -  She complains of pain in the neck, upper back, and knees Bilateral with radiation to the arms Bilateral, elbows Bilateral, hands Bilateral, lower leg Bilateral, ankles Bilateral, and feet Bilateral She rates the pain 6/10 and describes it as sharp, aching, burning.  Pain is made worse by: standing, bending, lifting. She denies any side effects from the current pain regimen. Patient reports that since last follow up visit the physical functioning is better, family/social relationships are unchanged, mood is better sleep patterns are better. Ms. Harris states that since starting the treatment with the current regimen the  overall functioning  in the above aspects is  better, Patient denies misusing/abusing her narcotic pain medications or using any illegal drugs.  There are No indicators for possible drug abuse, addiction or diversion problems.   Upon obtaining the medical history from Ms. Harris regarding today's office visit for her presenting problems, patient states she has been doing fair, reports she using Ms.Contin along with other non opioid regimen. She denies any constipation symptoms. She complains the pain is worse in the back than legs. She reports her abdomen hurts also.

## 2025-03-11 ENCOUNTER — HOSPITAL ENCOUNTER (OUTPATIENT)
Dept: MAMMOGRAPHY | Age: 70
Discharge: HOME | End: 2025-03-11
Payer: MEDICARE

## 2025-03-11 DIAGNOSIS — Z12.39 SCREENING BREAST EXAMINATION: ICD-10-CM

## 2025-03-11 PROCEDURE — 77063 BREAST TOMOSYNTHESIS BI: CPT

## 2025-03-31 ENCOUNTER — HOSPITAL ENCOUNTER (OUTPATIENT)
Age: 70
Discharge: HOME | End: 2025-03-31
Payer: MEDICARE

## 2025-03-31 LAB
ALBUMIN SERPL-MCNC: 4 G/DL (ref 3.4–5)
ALP SERPL-CCNC: 101 U/L (ref 40–129)
ALT SERPL-CCNC: 22 U/L (ref 10–40)
AST SERPL-CCNC: 26 U/L (ref 15–37)
BILIRUB DIRECT SERPL-MCNC: 0.2 MG/DL (ref 0–0.3)
BILIRUB INDIRECT SERPL-MCNC: 0.2 MG/DL (ref 0–1)
BILIRUB SERPL-MCNC: 0.4 MG/DL (ref 0–1)
CHOLEST SERPL-MCNC: 212 MG/DL (ref 0–199)
HDLC SERPL-MCNC: 49 MG/DL (ref 40–60)
LDL CHOLESTEROL: 146 MG/DL
PROT SERPL-MCNC: 7.1 G/DL (ref 6.4–8.2)
TRIGL SERPL-MCNC: 85 MG/DL (ref 0–150)
TSH SERPL DL<=0.005 MIU/L-ACNC: 2.9 UIU/ML (ref 0.27–4.2)
VLDLC SERPL CALC-MCNC: 17 MG/DL

## 2025-03-31 PROCEDURE — 80076 HEPATIC FUNCTION PANEL: CPT

## 2025-03-31 PROCEDURE — 83036 HEMOGLOBIN GLYCOSYLATED A1C: CPT

## 2025-03-31 PROCEDURE — 84443 ASSAY THYROID STIM HORMONE: CPT

## 2025-03-31 PROCEDURE — 80061 LIPID PANEL: CPT

## 2025-04-01 LAB
EST. AVERAGE GLUCOSE BLD GHB EST-MCNC: 111.2 MG/DL
HBA1C MFR BLD: 5.5 %

## 2025-04-04 ENCOUNTER — OFFICE VISIT (OUTPATIENT)
Dept: PAIN MANAGEMENT | Age: 70
End: 2025-04-04
Payer: MEDICARE

## 2025-04-04 VITALS
HEART RATE: 74 BPM | WEIGHT: 212 LBS | BODY MASS INDEX: 40.06 KG/M2 | OXYGEN SATURATION: 96 % | DIASTOLIC BLOOD PRESSURE: 75 MMHG | SYSTOLIC BLOOD PRESSURE: 122 MMHG

## 2025-04-04 DIAGNOSIS — M79.2 NEUROPATHIC PAIN: ICD-10-CM

## 2025-04-04 DIAGNOSIS — K43.2 RECURRENT INCISIONAL HERNIA WITH COMPLICATION: ICD-10-CM

## 2025-04-04 DIAGNOSIS — L03.311 ABDOMINAL WALL CELLULITIS: ICD-10-CM

## 2025-04-04 DIAGNOSIS — G89.4 CHRONIC PAIN SYNDROME: ICD-10-CM

## 2025-04-04 DIAGNOSIS — M79.7 FIBROMYALGIA: ICD-10-CM

## 2025-04-04 DIAGNOSIS — M17.0 PRIMARY OSTEOARTHRITIS OF BOTH KNEES: ICD-10-CM

## 2025-04-04 DIAGNOSIS — G43.709 CHRONIC MIGRAINE WITHOUT AURA WITHOUT STATUS MIGRAINOSUS, NOT INTRACTABLE: ICD-10-CM

## 2025-04-04 DIAGNOSIS — M51.372 DEGENERATION OF INTERVERTEBRAL DISC OF LUMBOSACRAL REGION WITH DISCOGENIC BACK PAIN AND LOWER EXTREMITY PAIN: ICD-10-CM

## 2025-04-04 PROCEDURE — 99213 OFFICE O/P EST LOW 20 MIN: CPT | Performed by: INTERNAL MEDICINE

## 2025-04-04 PROCEDURE — 1159F MED LIST DOCD IN RCRD: CPT | Performed by: INTERNAL MEDICINE

## 2025-04-04 PROCEDURE — 1123F ACP DISCUSS/DSCN MKR DOCD: CPT | Performed by: INTERNAL MEDICINE

## 2025-04-04 RX ORDER — MORPHINE SULFATE 15 MG/1
15 TABLET, FILM COATED, EXTENDED RELEASE ORAL 3 TIMES DAILY
Qty: 84 TABLET | Refills: 0 | Status: SHIPPED | OUTPATIENT
Start: 2025-04-04 | End: 2025-05-02

## 2025-04-04 NOTE — PROGRESS NOTES
advised against drinking alcohol with the narcotic pain medicines, advised against driving or handling machinery while adjusting the dose of medicines or if having cognitive  issues related to the current medications.Risk of overdose and death, if medicines not taken as prescribed, were also discussed. If the patient develops new symptoms or if the symptoms worsen, the patient should call the office.    Thank you for allowing me to participate in the care of this patient.      Cc: Kannan Jeffers MD

## 2025-04-16 DIAGNOSIS — G89.4 CHRONIC PAIN SYNDROME: ICD-10-CM

## 2025-04-16 DIAGNOSIS — M79.7 FIBROMYALGIA: ICD-10-CM

## 2025-04-16 DIAGNOSIS — M17.0 PRIMARY OSTEOARTHRITIS OF BOTH KNEES: ICD-10-CM

## 2025-04-28 RX ORDER — DICLOFENAC SODIUM 75 MG/1
75 TABLET, DELAYED RELEASE ORAL DAILY PRN
Qty: 30 TABLET | Refills: 0 | OUTPATIENT
Start: 2025-04-28

## 2025-05-05 ENCOUNTER — OFFICE VISIT (OUTPATIENT)
Dept: PAIN MANAGEMENT | Age: 70
End: 2025-05-05
Payer: MEDICARE

## 2025-05-05 VITALS
SYSTOLIC BLOOD PRESSURE: 125 MMHG | DIASTOLIC BLOOD PRESSURE: 85 MMHG | HEART RATE: 87 BPM | WEIGHT: 215 LBS | HEIGHT: 61 IN | BODY MASS INDEX: 40.59 KG/M2

## 2025-05-05 DIAGNOSIS — M51.372 DEGENERATION OF INTERVERTEBRAL DISC OF LUMBOSACRAL REGION WITH DISCOGENIC BACK PAIN AND LOWER EXTREMITY PAIN: ICD-10-CM

## 2025-05-05 DIAGNOSIS — M79.2 NEUROPATHIC PAIN: ICD-10-CM

## 2025-05-05 DIAGNOSIS — M17.0 PRIMARY OSTEOARTHRITIS OF BOTH KNEES: ICD-10-CM

## 2025-05-05 DIAGNOSIS — F51.01 PRIMARY INSOMNIA: ICD-10-CM

## 2025-05-05 DIAGNOSIS — M79.7 FIBROMYALGIA: ICD-10-CM

## 2025-05-05 DIAGNOSIS — G43.709 CHRONIC MIGRAINE WITHOUT AURA WITHOUT STATUS MIGRAINOSUS, NOT INTRACTABLE: ICD-10-CM

## 2025-05-05 DIAGNOSIS — G89.4 CHRONIC PAIN SYNDROME: ICD-10-CM

## 2025-05-05 DIAGNOSIS — Z79.899 ENCOUNTER FOR LONG-TERM (CURRENT) USE OF HIGH-RISK MEDICATION: ICD-10-CM

## 2025-05-05 DIAGNOSIS — L03.311 ABDOMINAL WALL CELLULITIS: ICD-10-CM

## 2025-05-05 DIAGNOSIS — F39 MOOD DISORDER: ICD-10-CM

## 2025-05-05 DIAGNOSIS — K43.2 RECURRENT INCISIONAL HERNIA WITH COMPLICATION: ICD-10-CM

## 2025-05-05 PROCEDURE — 1159F MED LIST DOCD IN RCRD: CPT | Performed by: INTERNAL MEDICINE

## 2025-05-05 PROCEDURE — 99214 OFFICE O/P EST MOD 30 MIN: CPT | Performed by: INTERNAL MEDICINE

## 2025-05-05 PROCEDURE — 1123F ACP DISCUSS/DSCN MKR DOCD: CPT | Performed by: INTERNAL MEDICINE

## 2025-05-05 NOTE — PROGRESS NOTES
Isabella Harris  1955  [id number]    HISTORY OF PRESENT ILLNESS:  Ms. Harris is a 70 y.o. female returns for a follow up visit for multiple medical problems.  Her  presenting problems are   1. Chronic pain syndrome    2. Fibromyalgia    3. Primary osteoarthritis of both knees    4. Degeneration of intervertebral disc of lumbosacral region with discogenic back pain and lower extremity pain    5. Neuropathic pain    6. Primary insomnia    7. Chronic migraine without aura without status migrainosus, not intractable    8. Recurrent incisional hernia with complication    9. Abdominal wall cellulitis    10. Mood disorder    11. Encounter for long-term (current) use of high-risk medication    .    As per information/history obtained from the PADT(patient assessment and documentation tool) -  She complains of pain in the  generalize pain all over  She rates the pain 6/10 and describes it as aching, burning.  Pain is made worse by: movement, standing, bending.  Current treatment regimen has helped relieve about 40% of the pain.  She denies side effects from the current pain regimen.   Patient reports that since last follow up visit the physical functioning is unchanged, family/social relationships are better, mood is unchanged sleep patterns are unchanged.  Ms. Harris states that since starting the treatment with the current regimen the  overall functioning  in the above aspects is  better,Patient denies neurological bowel or bladder. Patient denies misusing/abusing her narcotic pain medications or using any illegal drugs.  There are No indicators for possible drug abuse, addiction or diversion problems.     Upon obtaining the medical history from Ms. Harris regarding today's office visit for her presenting problems, patient states she has been doing about the same. Ms. Harris states she woke up 4 days ago with her neck feeling stiff, she states she could not turn it, she states it is present on both 
yes
yes

## 2025-05-06 RX ORDER — DICLOFENAC SODIUM 75 MG/1
75 TABLET, DELAYED RELEASE ORAL DAILY PRN
Qty: 30 TABLET | Refills: 0 | Status: SHIPPED | OUTPATIENT
Start: 2025-05-06

## 2025-05-06 RX ORDER — TOPIRAMATE 25 MG/1
25 TABLET, FILM COATED ORAL 2 TIMES DAILY
Qty: 60 TABLET | Refills: 1 | Status: SHIPPED | OUTPATIENT
Start: 2025-05-06

## 2025-05-06 RX ORDER — GABAPENTIN 600 MG/1
600 TABLET ORAL 2 TIMES DAILY
Qty: 60 TABLET | Refills: 1 | Status: SHIPPED | OUTPATIENT
Start: 2025-05-06 | End: 2025-07-05

## 2025-05-06 RX ORDER — MORPHINE SULFATE 15 MG/1
15 TABLET, FILM COATED, EXTENDED RELEASE ORAL 3 TIMES DAILY
Qty: 84 TABLET | Refills: 0 | Status: SHIPPED | OUTPATIENT
Start: 2025-05-06 | End: 2025-06-03

## 2025-05-06 RX ORDER — METHYLPREDNISOLONE 4 MG/1
TABLET ORAL
Qty: 1 KIT | Refills: 0 | Status: SHIPPED | OUTPATIENT
Start: 2025-05-06

## 2025-05-06 RX ORDER — METHOCARBAMOL 500 MG/1
500 TABLET, FILM COATED ORAL 2 TIMES DAILY PRN
Qty: 20 TABLET | Refills: 0 | Status: SHIPPED | OUTPATIENT
Start: 2025-05-06

## 2025-06-02 ENCOUNTER — OFFICE VISIT (OUTPATIENT)
Dept: PAIN MANAGEMENT | Age: 70
End: 2025-06-02
Payer: MEDICARE

## 2025-06-02 VITALS — SYSTOLIC BLOOD PRESSURE: 131 MMHG | HEART RATE: 82 BPM | OXYGEN SATURATION: 93 % | DIASTOLIC BLOOD PRESSURE: 82 MMHG

## 2025-06-02 DIAGNOSIS — G89.4 CHRONIC PAIN SYNDROME: ICD-10-CM

## 2025-06-02 DIAGNOSIS — M79.2 NEUROPATHIC PAIN: ICD-10-CM

## 2025-06-02 DIAGNOSIS — M51.372 DEGENERATION OF INTERVERTEBRAL DISC OF LUMBOSACRAL REGION WITH DISCOGENIC BACK PAIN AND LOWER EXTREMITY PAIN: ICD-10-CM

## 2025-06-02 DIAGNOSIS — M79.7 FIBROMYALGIA: ICD-10-CM

## 2025-06-02 DIAGNOSIS — M17.0 PRIMARY OSTEOARTHRITIS OF BOTH KNEES: ICD-10-CM

## 2025-06-02 PROCEDURE — 99213 OFFICE O/P EST LOW 20 MIN: CPT | Performed by: INTERNAL MEDICINE

## 2025-06-02 PROCEDURE — 1123F ACP DISCUSS/DSCN MKR DOCD: CPT | Performed by: INTERNAL MEDICINE

## 2025-06-02 PROCEDURE — 1159F MED LIST DOCD IN RCRD: CPT | Performed by: INTERNAL MEDICINE

## 2025-06-02 RX ORDER — MORPHINE SULFATE 15 MG/1
15 TABLET, FILM COATED, EXTENDED RELEASE ORAL 3 TIMES DAILY
Qty: 84 TABLET | Refills: 0 | Status: SHIPPED | OUTPATIENT
Start: 2025-06-02 | End: 2025-06-30

## 2025-06-02 RX ORDER — DICLOFENAC SODIUM 75 MG/1
75 TABLET, DELAYED RELEASE ORAL DAILY PRN
Qty: 30 TABLET | Refills: 0 | Status: SHIPPED | OUTPATIENT
Start: 2025-06-02

## 2025-06-02 RX ORDER — GABAPENTIN 600 MG/1
600 TABLET ORAL 2 TIMES DAILY
Qty: 60 TABLET | Refills: 1 | Status: SHIPPED | OUTPATIENT
Start: 2025-06-02 | End: 2025-08-01

## 2025-06-02 RX ORDER — METHOCARBAMOL 500 MG/1
500 TABLET, FILM COATED ORAL 2 TIMES DAILY PRN
Qty: 20 TABLET | Refills: 0 | Status: SHIPPED | OUTPATIENT
Start: 2025-06-02

## 2025-06-02 RX ORDER — TOPIRAMATE 25 MG/1
25 TABLET, FILM COATED ORAL 2 TIMES DAILY
Qty: 60 TABLET | Refills: 1 | Status: SHIPPED | OUTPATIENT
Start: 2025-06-02

## 2025-06-02 NOTE — PROGRESS NOTES
Isabella Harris  1955  [id number]      HISTORY OF PRESENT ILLNESS:  Ms. Harris is a 70 y.o. female returns for a follow up visit for pain management  She has a diagnosis of   1. Chronic pain syndrome    2. Fibromyalgia    3. Primary osteoarthritis of both knees    4. Primary insomnia    5. Neuropathic pain    6. Degeneration of intervertebral disc of lumbosacral region with discogenic back pain and lower extremity pain    7. Chronic migraine without aura without status migrainosus, not intractable    .      As per information/history obtained from the PADT(patient assessment and documentation tool) -  She complains of pain in the  generalize pain all over  She rates the pain 6/10 and describes it as sharp, aching, burning.  Pain is made worse by: movement, walking, standing, lifting. She denies any side effects from the current pain regimen. Patient reports that since last follow up visit the physical functioning is unchanged, family/social relationships are better, mood is better sleep patterns are unchanged. Ms. Harris states that since starting the treatment with the current regimen the  overall functioning  in the above aspects is  better, Patient denies misusing/abusing her narcotic pain medications or using any illegal drugs.  There are No indicators for possible drug abuse, addiction or diversion problems.   Upon obtaining the medical history from Ms. Harris regarding today's office visit for her presenting problems, patient states she has been doing fair, pain has been manageable somewhat. She reports she has been having some family stressors. She says she's using Ms.Contin 15 mg TID along with other adjuvants.     ALLERGIES: Patients list of allergies were reviewed     MEDICATIONS: Ms. Harris list of medications were reviewed.Her current medications are   Outpatient Medications Prior to Visit   Medication Sig Dispense Refill    amitriptyline (ELAVIL) 25 MG tablet Take 1-2 tablets by 
supervision/verbal cues/nonverbal cues (demo/gestures)

## 2025-07-03 ENCOUNTER — OFFICE VISIT (OUTPATIENT)
Dept: PAIN MANAGEMENT | Age: 70
End: 2025-07-03
Payer: MEDICARE

## 2025-07-03 VITALS
WEIGHT: 213 LBS | HEART RATE: 97 BPM | BODY MASS INDEX: 40.27 KG/M2 | OXYGEN SATURATION: 97 % | DIASTOLIC BLOOD PRESSURE: 79 MMHG | SYSTOLIC BLOOD PRESSURE: 115 MMHG

## 2025-07-03 DIAGNOSIS — M51.372 DEGENERATION OF INTERVERTEBRAL DISC OF LUMBOSACRAL REGION WITH DISCOGENIC BACK PAIN AND LOWER EXTREMITY PAIN: ICD-10-CM

## 2025-07-03 DIAGNOSIS — G89.4 CHRONIC PAIN SYNDROME: ICD-10-CM

## 2025-07-03 DIAGNOSIS — G43.709 CHRONIC MIGRAINE WITHOUT AURA WITHOUT STATUS MIGRAINOSUS, NOT INTRACTABLE: ICD-10-CM

## 2025-07-03 DIAGNOSIS — M17.0 PRIMARY OSTEOARTHRITIS OF BOTH KNEES: ICD-10-CM

## 2025-07-03 DIAGNOSIS — M79.7 FIBROMYALGIA: ICD-10-CM

## 2025-07-03 DIAGNOSIS — M79.2 NEUROPATHIC PAIN: ICD-10-CM

## 2025-07-03 PROCEDURE — 99213 OFFICE O/P EST LOW 20 MIN: CPT | Performed by: INTERNAL MEDICINE

## 2025-07-03 PROCEDURE — 1159F MED LIST DOCD IN RCRD: CPT | Performed by: INTERNAL MEDICINE

## 2025-07-03 PROCEDURE — 1123F ACP DISCUSS/DSCN MKR DOCD: CPT | Performed by: INTERNAL MEDICINE

## 2025-07-03 RX ORDER — METHOCARBAMOL 500 MG/1
500 TABLET, FILM COATED ORAL 2 TIMES DAILY PRN
Qty: 20 TABLET | Refills: 0 | Status: SHIPPED | OUTPATIENT
Start: 2025-07-03

## 2025-07-03 RX ORDER — DICLOFENAC SODIUM 75 MG/1
75 TABLET, DELAYED RELEASE ORAL DAILY PRN
Qty: 30 TABLET | Refills: 0 | Status: SHIPPED | OUTPATIENT
Start: 2025-07-03

## 2025-07-03 RX ORDER — MORPHINE SULFATE 15 MG/1
15 TABLET, FILM COATED, EXTENDED RELEASE ORAL 3 TIMES DAILY
Qty: 84 TABLET | Refills: 0 | Status: SHIPPED | OUTPATIENT
Start: 2025-07-03 | End: 2025-07-31

## 2025-07-03 RX ORDER — GABAPENTIN 600 MG/1
600 TABLET ORAL 2 TIMES DAILY
Qty: 60 TABLET | Refills: 1 | Status: SHIPPED | OUTPATIENT
Start: 2025-07-03 | End: 2025-09-01

## 2025-07-03 RX ORDER — TOPIRAMATE 25 MG/1
25 TABLET, FILM COATED ORAL 2 TIMES DAILY
Qty: 60 TABLET | Refills: 1 | Status: SHIPPED | OUTPATIENT
Start: 2025-07-03

## 2025-07-14 NOTE — PROGRESS NOTES
Isabella Harris  1955  [id number]      HISTORY OF PRESENT ILLNESS:  Ms. Harris is a 70 y.o. female returns for a follow up visit for pain management  She has a diagnosis of   1. Chronic pain syndrome    2. Degeneration of intervertebral disc of lumbosacral region with discogenic back pain and lower extremity pain    3. Fibromyalgia    4. Primary insomnia    5. Mood disorder    6. Primary osteoarthritis of both knees    7. Chronic migraine without aura without status migrainosus, not intractable    8. Neuropathic pain    .      As per information/history obtained from the PADT(patient assessment and documentation tool) -  She complains of pain in the neck and lower back with radiation to the shoulders Bilateral, arms Bilateral, elbows Bilateral, hands Bilateral, buttocks, hips Bilateral, upper leg Bilateral, knees Bilateral, lower leg Bilateral, ankles Bilateral, and feet Bilateral She rates the pain 6/10 and describes it as sharp, aching, burning.  Pain is made worse by: movement, standing. She denies any side effects from the current pain regimen. Patient reports that since last follow up visit the physical functioning is unchanged, family/social relationships are unchanged, mood is unchanged sleep patterns are unchanged. Ms. Harris states that since starting the treatment with the current regimen the  overall functioning  in the above aspects is  unchanged, Patient denies misusing/abusing her narcotic pain medications or using any illegal drugs.  There are No indicators for possible drug abuse, addiction or diversion problems.   Upon obtaining the medical history from Ms. Harris regarding today's office visit for her presenting problems, patient states she is doing fair, managing with the medications. She mentions she was in [place] for vacation. She says she's using Ms.Contin 3 per day along with other non opioid adjuvants. She reports she is managing her house chores and ADLs.     ALLERGIES:

## 2025-07-31 ENCOUNTER — OFFICE VISIT (OUTPATIENT)
Dept: PAIN MANAGEMENT | Age: 70
End: 2025-07-31
Payer: MEDICARE

## 2025-07-31 VITALS
OXYGEN SATURATION: 95 % | BODY MASS INDEX: 40.08 KG/M2 | SYSTOLIC BLOOD PRESSURE: 117 MMHG | WEIGHT: 212 LBS | HEART RATE: 72 BPM | DIASTOLIC BLOOD PRESSURE: 70 MMHG

## 2025-07-31 DIAGNOSIS — M79.2 NEUROPATHIC PAIN: ICD-10-CM

## 2025-07-31 DIAGNOSIS — M79.7 FIBROMYALGIA: ICD-10-CM

## 2025-07-31 DIAGNOSIS — M51.372 DEGENERATION OF INTERVERTEBRAL DISC OF LUMBOSACRAL REGION WITH DISCOGENIC BACK PAIN AND LOWER EXTREMITY PAIN: ICD-10-CM

## 2025-07-31 DIAGNOSIS — Z91.89 AT RISK FOR RESPIRATORY DEPRESSION DUE TO OPIOID: ICD-10-CM

## 2025-07-31 DIAGNOSIS — G89.4 CHRONIC PAIN SYNDROME: ICD-10-CM

## 2025-07-31 DIAGNOSIS — M17.0 PRIMARY OSTEOARTHRITIS OF BOTH KNEES: ICD-10-CM

## 2025-07-31 PROCEDURE — 1159F MED LIST DOCD IN RCRD: CPT | Performed by: INTERNAL MEDICINE

## 2025-07-31 PROCEDURE — 99213 OFFICE O/P EST LOW 20 MIN: CPT | Performed by: INTERNAL MEDICINE

## 2025-07-31 PROCEDURE — 1123F ACP DISCUSS/DSCN MKR DOCD: CPT | Performed by: INTERNAL MEDICINE

## 2025-07-31 RX ORDER — METHOCARBAMOL 500 MG/1
500 TABLET, FILM COATED ORAL 2 TIMES DAILY PRN
Qty: 20 TABLET | Refills: 0 | Status: SHIPPED | OUTPATIENT
Start: 2025-07-31

## 2025-07-31 RX ORDER — DICLOFENAC SODIUM 75 MG/1
75 TABLET, DELAYED RELEASE ORAL DAILY PRN
Qty: 30 TABLET | Refills: 0 | Status: SHIPPED | OUTPATIENT
Start: 2025-07-31

## 2025-07-31 RX ORDER — TOPIRAMATE 25 MG/1
25 TABLET, FILM COATED ORAL 2 TIMES DAILY
Qty: 60 TABLET | Refills: 1 | Status: SHIPPED | OUTPATIENT
Start: 2025-07-31

## 2025-07-31 RX ORDER — MORPHINE SULFATE 15 MG/1
15 TABLET, FILM COATED, EXTENDED RELEASE ORAL 3 TIMES DAILY
Qty: 84 TABLET | Refills: 0 | Status: SHIPPED | OUTPATIENT
Start: 2025-07-31 | End: 2025-08-28

## 2025-07-31 RX ORDER — GABAPENTIN 600 MG/1
600 TABLET ORAL 2 TIMES DAILY
Qty: 60 TABLET | Refills: 1 | Status: SHIPPED | OUTPATIENT
Start: 2025-07-31 | End: 2025-09-29

## 2025-08-04 LAB
1,3 CHLOROPHENYL PIPERAZINE, URINE: NOT DETECTED
6-MONOACETYLMORPHINE: NEGATIVE NG/ML
7-AMINOCLONAZEPAM/CREATININE URINE: NOT DETECTED NG/MG CREAT
8-HYDROXYAMOXAPINE, URINE: NOT DETECTED
8-HYDROXYLOXAPINE, URINE: NOT DETECTED
ACETAMINOPHEN, URINE: NORMAL UG/ML
ACETAMINOPHEN, URINE: PRESENT
ALPHA HYDROXYALPRAZOLAM/CREATININE URINE: NOT DETECTED NG/MG CREAT
ALPHA HYDROXYTRIAZOLAM/CREAT UR CFM: NOT DETECTED NG/MG CREAT
ALPHA-HYDROXYMIDAZOLAM, URINE: NOT DETECTED NG/MG CREAT
ALPRAZOLAM/CREATININE URINE: NOT DETECTED NG/MG CREAT
AMINO CHLOROPYRIDINE, URINE: NOT DETECTED
AMITRIPTYLINE: PRESENT
AMOXAPINE, URINE: NOT DETECTED
AMPHETAMINE SCREEN URINE: NEGATIVE NG/ML
ANALGESICS SCREEN URINE: NORMAL
ANTIDEPRESSANTS SCREEN URINE: NORMAL
ANTIPSYCHOTICS SCREEN URINE: NEGATIVE
ARIPIPRAZOLE, URINE: NOT DETECTED
ASENAPINE, URINE: NOT DETECTED
BACLOFEN, URINE: NOT DETECTED
BARBITURATE SCREEN URINE: NEGATIVE NG/ML
BENZODIAZEPINE SCREEN, URINE: NEGATIVE
BUPRENORPHINE URINE: NEGATIVE
BUPRENORPHINE/CREATININE URINE: NOT DETECTED NG/MG CREAT
BUPROPION, URINE: NOT DETECTED
CANNABINOID SCREEN URINE: NEGATIVE NG/ML
CARBAMAZEPINE, URINE: NOT DETECTED
CARISOPRODOL, URINE: NOT DETECTED
CHLORPROMAZINE, URINE: NOT DETECTED
CITALOPRAM, URINE: NOT DETECTED
CLOBAZAM, URINE: NOT DETECTED
CLOMIPRAMINE, URINE: NOT DETECTED
CLONAZEPAM/CREATININE URINE: NOT DETECTED NG/MG CREAT
CLOZAPINE, URINE: NOT DETECTED
COCAINE METABOLITE SCREEN URINE: NEGATIVE NG/ML
CODEINE, URINE: NOT DETECTED NG/MG CREAT
CREATININE URINE: 88 MG/DL
CYCLOBENZAPRINE, URINE: NOT DETECTED
DESALKYLFLURAZEPAM/CREATININE URINE: NOT DETECTED NG/MG CREAT
DESIPRAMINE: NOT DETECTED
DESMETHYLCITALOPRAM, URINE: NOT DETECTED
DESMETHYLCLOMIPRAMINE, URINE: NOT DETECTED
DESMETHYLCLOZAPINE, URINE: NOT DETECTED
DESMETHYLCYCLOBENZAPRINE, URINE: NOT DETECTED
DESMETHYLDOXEPIN, URINE: NOT DETECTED
DESMETHYLFLUNITRAZEPAM, URINE: NOT DETECTED NG/MG CREAT
DESMETHYLSERTRALINE, URINE: NOT DETECTED
DESMETHYLVENLAFAXINE, URINE: NOT DETECTED
DIAZEPAM/CREATININE URINE: NOT DETECTED NG/MG CREAT
DIHYDROCODEINE/CREATININE URINE: NOT DETECTED NG/MG CREAT
DOXEPIN, URINE: NOT DETECTED
DULOXETINE, URINE: NOT DETECTED
EZOGABINE, URINE: NOT DETECTED
FENTANYL / ANALOGUES SCREEN URINE: NEGATIVE
FENTANYL/CREATININE URINE: NOT DETECTED NG/MG CREAT
FLUNITRAZEPAM, URINE: NOT DETECTED NG/MG CREAT
FLUOXETINE, URINE: PRESENT
FLUPHENAZINE, URINE: NOT DETECTED
FLUVOXAMINE, URINE: NOT DETECTED
GABAPENTIN: PRESENT
HALOPERIDOL, URINE: NOT DETECTED
HYDROCODONE GC/MS CONF: NOT DETECTED NG/MG CREAT
HYDROMORPHONE GC/MS CONF: 58 NG/MG CREAT
HYDROXYBUPROPION, URINE: NOT DETECTED
ILOPERIDONE, URINE: NOT DETECTED
IMIPRAMINE, URINE: NOT DETECTED
KETAMINE, URINE: NOT DETECTED
LABORATORY REPORT: NORMAL
LAMOTRIGINE, URINE: NOT DETECTED
LEVETIRACETAM, URINE: NOT DETECTED
LORAZEPAM/CREATININE URINE: NOT DETECTED NG/MG CREAT
LOXAPINE, URINE: NOT DETECTED
LURASIDONE, URINE: NOT DETECTED
MAPROTILINE, URINE: NOT DETECTED
MEPERIDINE: NEGATIVE NG/ML
MEPROBAMATE, URINE: NOT DETECTED
MESORIDAZINE, URINE: NOT DETECTED
METAXALONE, URINE: NOT DETECTED
METHADONE AND METABOLITES, URINE: NEGATIVE NG/ML
METHADONE SCREEN, URINE: NEGATIVE NG/ML
METHOCARBAMOL, URINE: NOT DETECTED
METHYLPHENIDATE, URINE: NOT DETECTED
MIDAZOLAM/CREATININE URINE: NOT DETECTED NG/MG CREAT
MIRTAZAPINE, URINE: NOT DETECTED
MOLINDONE, URINE: NOT DETECTED
MORPHINE GC/MS CONF: 5175 NG/MG CREAT
MUSCLE RELAXANTS SCREEN URINE: NEGATIVE
NEFAZODONE, URINE: NOT DETECTED
NORBUPRENORPHINE/CREATININE URINE: NOT DETECTED NG/MG CREAT
NORCODEINE/CREATININE URINE: NOT DETECTED NG/MG CREAT
NORDIAZEPAM/CREATININE URINE: NOT DETECTED NG/MG CREAT
NORFENTANYL/CREATININE URINE: NOT DETECTED NG/MG CREAT
NORFLUOXETINE, URINE: PRESENT
NORHYDROCODONE (LCMSMS): NOT DETECTED NG/MG CREAT
NORKETAMINE, URINE: NOT DETECTED
NORMORPHINE URINE: 301 NG/MG CREAT
NORTRIPTYLINE: NOT DETECTED
OLANZAPINE, URINE: NOT DETECTED
OPIATE SCREEN URINE: NORMAL NG/ML
OPIATES: NORMAL
ORPHENADRINE, URINE: NOT DETECTED
OTHER HALLUCINOGENS SCREEN URINE: NEGATIVE
OXAZEPAM/CREATININE URINE: NOT DETECTED NG/MG CREAT
OXCARBAZEPINE MHD, URINE: NOT DETECTED
OXYCODONE SCREEN URINE: NEGATIVE NG/ML
PAROXETINE, URINE: NOT DETECTED
PERPHENAZINE, URINE: NOT DETECTED
PHENCYCLIDINE SCREEN URINE: NEGATIVE NG/ML
PHENYTOIN, URINE: NOT DETECTED
PIMOZIDE, URINE: NOT DETECTED
PREGABALIN, URINE: NOT DETECTED
PREGABALIN: NORMAL
PRESCRIBED MEDICATIONS: NORMAL
PRIMIDONE, URINE: NOT DETECTED
PROCHLORPERAZINE, URINE: NOT DETECTED
PROPOXYPHENE: NEGATIVE NG/ML
PROTRIPTYLINE, URINE: NOT DETECTED
QUETIAPINE, URINE: NOT DETECTED
RISPERIDONE, URINE: NOT DETECTED
RITALINIC ACID, UR: NOT DETECTED
RUFINAMIDE, URINE: NOT DETECTED
SEDATIVES/HYPNOTICS SCREEN URINE: NEGATIVE
SERTRALINE, URINE: NOT DETECTED
SYMPATHOMIMETICS SCREEN URINE: NEGATIVE
TAPENTADOL SCREEN URINE: NEGATIVE NG/ML
TEMAZEPAM/CREATININE URINE: NOT DETECTED NG/MG CREAT
THIORIDAZINE, URINE: NOT DETECTED
THIOTHIXENE, URINE: NOT DETECTED
TIAGABINE, URINE: NOT DETECTED
TIZANIDINE, URINE: NOT DETECTED
TOPIRAMATE, URINE: PRESENT
TRAMADOL SCREEN URINE: NEGATIVE NG/ML
TRAZODONE, URINE: NOT DETECTED
TRIFLUOPERAZINE, URINE: NOT DETECTED
TRIMIPRAMINE, URINE: NOT DETECTED
VENLAFAXINE, URINE: NOT DETECTED
VILAZODONE, URINE: NOT DETECTED
ZALEPLON, URINE: NOT DETECTED
ZIPRASIDONE, URINE: NOT DETECTED
ZOLPIDEM ACID, URINE: NOT DETECTED
ZOLPIDEM, URINE: NOT DETECTED
ZONISAMIDE, URINE: NOT DETECTED
ZOPICLONE/ESZOPICLONE, URINE: NOT DETECTED

## 2025-08-29 ENCOUNTER — OFFICE VISIT (OUTPATIENT)
Dept: PAIN MANAGEMENT | Age: 70
End: 2025-08-29
Payer: MEDICARE

## 2025-08-29 VITALS
WEIGHT: 213 LBS | HEART RATE: 89 BPM | OXYGEN SATURATION: 91 % | DIASTOLIC BLOOD PRESSURE: 79 MMHG | SYSTOLIC BLOOD PRESSURE: 123 MMHG | BODY MASS INDEX: 40.27 KG/M2

## 2025-08-29 DIAGNOSIS — M79.2 NEUROPATHIC PAIN: ICD-10-CM

## 2025-08-29 DIAGNOSIS — F51.01 PRIMARY INSOMNIA: ICD-10-CM

## 2025-08-29 DIAGNOSIS — G89.4 CHRONIC PAIN SYNDROME: ICD-10-CM

## 2025-08-29 DIAGNOSIS — F39 MOOD DISORDER: ICD-10-CM

## 2025-08-29 DIAGNOSIS — M51.372 DEGENERATION OF INTERVERTEBRAL DISC OF LUMBOSACRAL REGION WITH DISCOGENIC BACK PAIN AND LOWER EXTREMITY PAIN: ICD-10-CM

## 2025-08-29 DIAGNOSIS — Z79.899 ENCOUNTER FOR LONG-TERM (CURRENT) USE OF HIGH-RISK MEDICATION: ICD-10-CM

## 2025-08-29 DIAGNOSIS — M79.7 FIBROMYALGIA: ICD-10-CM

## 2025-08-29 DIAGNOSIS — M17.0 PRIMARY OSTEOARTHRITIS OF BOTH KNEES: ICD-10-CM

## 2025-08-29 DIAGNOSIS — G43.709 CHRONIC MIGRAINE WITHOUT AURA WITHOUT STATUS MIGRAINOSUS, NOT INTRACTABLE: ICD-10-CM

## 2025-08-29 DIAGNOSIS — K43.2 RECURRENT INCISIONAL HERNIA WITH COMPLICATION: ICD-10-CM

## 2025-08-29 DIAGNOSIS — L03.311 ABDOMINAL WALL CELLULITIS: ICD-10-CM

## 2025-08-29 PROCEDURE — 1159F MED LIST DOCD IN RCRD: CPT | Performed by: INTERNAL MEDICINE

## 2025-08-29 PROCEDURE — 1123F ACP DISCUSS/DSCN MKR DOCD: CPT | Performed by: INTERNAL MEDICINE

## 2025-08-29 PROCEDURE — 99214 OFFICE O/P EST MOD 30 MIN: CPT | Performed by: INTERNAL MEDICINE

## 2025-08-29 RX ORDER — TOPIRAMATE 25 MG/1
25 TABLET, FILM COATED ORAL 2 TIMES DAILY
Qty: 60 TABLET | Refills: 1 | Status: SHIPPED | OUTPATIENT
Start: 2025-08-29

## 2025-08-29 RX ORDER — METHOCARBAMOL 500 MG/1
500 TABLET, FILM COATED ORAL 2 TIMES DAILY PRN
Qty: 20 TABLET | Refills: 0 | Status: SHIPPED | OUTPATIENT
Start: 2025-08-29

## 2025-08-29 RX ORDER — GABAPENTIN 600 MG/1
600 TABLET ORAL 2 TIMES DAILY
Qty: 60 TABLET | Refills: 1 | Status: SHIPPED | OUTPATIENT
Start: 2025-08-29 | End: 2025-10-28

## 2025-08-29 RX ORDER — DICLOFENAC SODIUM 75 MG/1
75 TABLET, DELAYED RELEASE ORAL DAILY PRN
Qty: 30 TABLET | Refills: 0 | Status: SHIPPED | OUTPATIENT
Start: 2025-08-29

## 2025-08-29 RX ORDER — MORPHINE SULFATE 15 MG/1
15 TABLET, FILM COATED, EXTENDED RELEASE ORAL 3 TIMES DAILY
Qty: 84 TABLET | Refills: 0 | Status: SHIPPED | OUTPATIENT
Start: 2025-08-29 | End: 2025-09-26

## (undated) DEVICE — GUIDEWIRE ENDOSCP L150CM DIA0.035IN TIP 3CM PTFE NIT

## (undated) DEVICE — SINGLE ACTION PUMPING SYSTEM

## (undated) DEVICE — URETERAL ACCESS SHEATH SET: Brand: NAVIGATOR HD

## (undated) DEVICE — SOLUTION IRRIG 3000ML 0.9% SOD CHL USP UROMATIC PLAS CONT

## (undated) DEVICE — SOLUTION IRRIG 1000ML STRL H2O USP PLAS POUR BTL

## (undated) DEVICE — OPEN-END FLEXI-TIP URETERAL CATHETER: Brand: FLEXI-TIP

## (undated) DEVICE — DUAL LUMEN URETERAL CATHETER

## (undated) DEVICE — SET,IRRIGATION,CYSTO,Y-TYPE,90": Brand: MEDLINE

## (undated) DEVICE — TOWEL,STOP FLAG GOLD N-W: Brand: MEDLINE

## (undated) DEVICE — GARMENT,MEDLINE,DVT,INT,CALF,MED, GEN2: Brand: MEDLINE

## (undated) DEVICE — SEAL ENDOSCP INSTR BX PRT FOR ACC UPTO 3FR

## (undated) DEVICE — MEDIA CONTRAST INJ OPTIRAY 300 50 ML

## (undated) DEVICE — MEDICINE CUP, GRADUATED, STER: Brand: MEDLINE

## (undated) DEVICE — GUIDEWIRE URO L145CM DIA0.035IN TIP L7CM S STL PTFE BENT

## (undated) DEVICE — URETEROSCOPE RIGID ASPIR SYS STRL DISP CVAC  MIN ORDER 2BX

## (undated) DEVICE — GLOVE ORANGE PI 7 1/2   MSG9075

## (undated) DEVICE — CYSTO: Brand: MEDLINE INDUSTRIES, INC.

## (undated) DEVICE — NITINOL STONE RETRIEVAL BASKET: Brand: ZERO TIP